# Patient Record
Sex: FEMALE | Race: WHITE | NOT HISPANIC OR LATINO
[De-identification: names, ages, dates, MRNs, and addresses within clinical notes are randomized per-mention and may not be internally consistent; named-entity substitution may affect disease eponyms.]

---

## 2017-01-16 ENCOUNTER — MEDICATION RENEWAL (OUTPATIENT)
Age: 82
End: 2017-01-16

## 2017-01-17 ENCOUNTER — NON-APPOINTMENT (OUTPATIENT)
Age: 82
End: 2017-01-17

## 2017-01-17 ENCOUNTER — APPOINTMENT (OUTPATIENT)
Dept: CARDIOLOGY | Facility: CLINIC | Age: 82
End: 2017-01-17

## 2017-01-17 VITALS
HEART RATE: 66 BPM | WEIGHT: 98 LBS | HEIGHT: 59 IN | DIASTOLIC BLOOD PRESSURE: 70 MMHG | BODY MASS INDEX: 19.76 KG/M2 | SYSTOLIC BLOOD PRESSURE: 122 MMHG

## 2017-01-17 DIAGNOSIS — R07.89 OTHER CHEST PAIN: ICD-10-CM

## 2017-01-24 ENCOUNTER — RX RENEWAL (OUTPATIENT)
Age: 82
End: 2017-01-24

## 2017-09-30 ENCOUNTER — RX RENEWAL (OUTPATIENT)
Age: 82
End: 2017-09-30

## 2017-10-11 ENCOUNTER — RX RENEWAL (OUTPATIENT)
Age: 82
End: 2017-10-11

## 2017-10-16 ENCOUNTER — RX RENEWAL (OUTPATIENT)
Age: 82
End: 2017-10-16

## 2017-10-23 ENCOUNTER — RESULT REVIEW (OUTPATIENT)
Age: 82
End: 2017-10-23

## 2017-11-20 ENCOUNTER — OUTPATIENT (OUTPATIENT)
Dept: OUTPATIENT SERVICES | Facility: HOSPITAL | Age: 82
LOS: 1 days | End: 2017-11-20
Payer: MEDICARE

## 2017-11-20 VITALS
DIASTOLIC BLOOD PRESSURE: 73 MMHG | SYSTOLIC BLOOD PRESSURE: 139 MMHG | RESPIRATION RATE: 17 BRPM | HEIGHT: 58 IN | OXYGEN SATURATION: 97 % | WEIGHT: 103.84 LBS | TEMPERATURE: 99 F | HEART RATE: 54 BPM

## 2017-11-20 DIAGNOSIS — C50.912 MALIGNANT NEOPLASM OF UNSPECIFIED SITE OF LEFT FEMALE BREAST: ICD-10-CM

## 2017-11-20 DIAGNOSIS — Z98.890 OTHER SPECIFIED POSTPROCEDURAL STATES: Chronic | ICD-10-CM

## 2017-11-20 DIAGNOSIS — I48.2 CHRONIC ATRIAL FIBRILLATION: ICD-10-CM

## 2017-11-20 DIAGNOSIS — S82.009A UNSPECIFIED FRACTURE OF UNSPECIFIED PATELLA, INITIAL ENCOUNTER FOR CLOSED FRACTURE: Chronic | ICD-10-CM

## 2017-11-20 DIAGNOSIS — Z01.818 ENCOUNTER FOR OTHER PREPROCEDURAL EXAMINATION: ICD-10-CM

## 2017-11-20 DIAGNOSIS — R26.81 UNSTEADINESS ON FEET: ICD-10-CM

## 2017-11-20 PROCEDURE — 80048 BASIC METABOLIC PNL TOTAL CA: CPT

## 2017-11-20 PROCEDURE — 85027 COMPLETE CBC AUTOMATED: CPT

## 2017-11-20 PROCEDURE — 83036 HEMOGLOBIN GLYCOSYLATED A1C: CPT

## 2017-11-20 PROCEDURE — G0463: CPT

## 2017-11-20 RX ORDER — LIDOCAINE HCL 20 MG/ML
0.2 VIAL (ML) INJECTION ONCE
Qty: 0 | Refills: 0 | Status: DISCONTINUED | OUTPATIENT
Start: 2017-11-27 | End: 2017-12-12

## 2017-11-20 RX ORDER — SODIUM CHLORIDE 9 MG/ML
3 INJECTION INTRAMUSCULAR; INTRAVENOUS; SUBCUTANEOUS EVERY 8 HOURS
Qty: 0 | Refills: 0 | Status: DISCONTINUED | OUTPATIENT
Start: 2017-11-27 | End: 2017-12-12

## 2017-11-20 NOTE — H&P PST ADULT - HISTORY OF PRESENT ILLNESS
89 year old female felt lump left breast had mammo/sono biopsy + left breast cancer for surgery.  HX of digoxin  "is not on any medication that could cause bleeding due to hx of falls. "

## 2017-11-20 NOTE — H&P PST ADULT - NSANTHOSAYNRD_GEN_A_CORE
No. BRANDON screening performed.  STOP BANG Legend: 0-2 = LOW Risk; 3-4 = INTERMEDIATE Risk; 5-8 = HIGH Risk

## 2017-11-20 NOTE — H&P PST ADULT - PMH
Age-Related Hearing Loss  wears hearing aids  Atrial Fibrillation    Bleeding Duodenal Ulcer  resolved several years ago  CAD (coronary artery disease)    Fall, subsequent encounter  2 years  HTN (Hypertension)    Hypercholesteremia    Injury of head, subsequent encounter  s/p fall 2 years ago   had bleed into area now ok.  Malignant neoplasm of left breast in female, estrogen receptor positive, unspecified site of breast  ER/NE positive  HER Negative (per family)  Osteopenia    Polymyalgia rheumatica Age-Related Hearing Loss  wears hearing aids  Atrial Fibrillation    Bleeding Duodenal Ulcer  resolved several years ago  CAD (coronary artery disease)    Fall, subsequent encounter  2 years  HTN (Hypertension)    Hypercholesteremia    Injury of head, subsequent encounter  s/p fall 2 years ago   had bleed into area now ok.  Malignant neoplasm of left breast in female, estrogen receptor positive, unspecified site of breast  ER/WA positive  HER Negative (per family)  Osteopenia    Polymyalgia rheumatica    Unsteady gait

## 2017-11-20 NOTE — H&P PST ADULT - ATTENDING COMMENTS
Anesthesia can only approve light sedation with local in light of hx of pul htn.  Implications of this discussed with patient and son.  Patient will be more aware of surgery and local anesthetic main form of pain control.  In light of small breast size this is believed to be reasonable but risks of potential awareness of discomfort discussed with patient.  Patient accepts and agrees to proceed.

## 2017-11-20 NOTE — H&P PST ADULT - PSH
Atherosclerotic Coronary Vascular Disease  coronary stent placement in 2004  Closed fracture of patella  left   surgery with pins/screws  4- 5 years ago  S/P breast biopsy, left    S/P colonoscopy  8 years ago negative  Stented Coronary Artery  PCI X 1

## 2017-11-22 ENCOUNTER — APPOINTMENT (OUTPATIENT)
Dept: CARDIOLOGY | Facility: CLINIC | Age: 82
End: 2017-11-22
Payer: MEDICARE

## 2017-11-22 ENCOUNTER — NON-APPOINTMENT (OUTPATIENT)
Age: 82
End: 2017-11-22

## 2017-11-22 ENCOUNTER — OUTPATIENT (OUTPATIENT)
Dept: OUTPATIENT SERVICES | Facility: HOSPITAL | Age: 82
LOS: 1 days | End: 2017-11-22
Payer: MEDICARE

## 2017-11-22 ENCOUNTER — APPOINTMENT (OUTPATIENT)
Dept: ULTRASOUND IMAGING | Facility: IMAGING CENTER | Age: 82
End: 2017-11-22
Payer: MEDICARE

## 2017-11-22 VITALS
SYSTOLIC BLOOD PRESSURE: 153 MMHG | DIASTOLIC BLOOD PRESSURE: 63 MMHG | RESPIRATION RATE: 14 BRPM | BODY MASS INDEX: 20.76 KG/M2 | HEART RATE: 62 BPM | HEIGHT: 59 IN | WEIGHT: 103 LBS | OXYGEN SATURATION: 96 %

## 2017-11-22 DIAGNOSIS — C50.912 MALIGNANT NEOPLASM OF UNSPECIFIED SITE OF LEFT FEMALE BREAST: ICD-10-CM

## 2017-11-22 DIAGNOSIS — S82.009A UNSPECIFIED FRACTURE OF UNSPECIFIED PATELLA, INITIAL ENCOUNTER FOR CLOSED FRACTURE: Chronic | ICD-10-CM

## 2017-11-22 DIAGNOSIS — Z98.890 OTHER SPECIFIED POSTPROCEDURAL STATES: Chronic | ICD-10-CM

## 2017-11-22 DIAGNOSIS — Z01.810 ENCOUNTER FOR PREPROCEDURAL CARDIOVASCULAR EXAMINATION: ICD-10-CM

## 2017-11-22 PROCEDURE — C1739: CPT

## 2017-11-22 PROCEDURE — 19285 PERQ DEV BREAST 1ST US IMAG: CPT

## 2017-11-22 PROCEDURE — 93000 ELECTROCARDIOGRAM COMPLETE: CPT

## 2017-11-22 PROCEDURE — 19285 PERQ DEV BREAST 1ST US IMAG: CPT | Mod: LT

## 2017-11-22 PROCEDURE — 99215 OFFICE O/P EST HI 40 MIN: CPT

## 2017-11-22 RX ORDER — SUVOREXANT 10 MG/1
10 TABLET, FILM COATED ORAL
Qty: 10 | Refills: 0 | Status: COMPLETED | COMMUNITY
Start: 2017-08-15

## 2017-11-22 RX ORDER — METFORMIN ER 500 MG 500 MG/1
500 TABLET ORAL
Qty: 60 | Refills: 0 | Status: COMPLETED | COMMUNITY
Start: 2017-05-01

## 2017-11-22 RX ORDER — METRONIDAZOLE 7.5 MG/G
0.75 LOTION TOPICAL
Qty: 59 | Refills: 0 | Status: COMPLETED | COMMUNITY
Start: 2017-07-19

## 2017-11-22 RX ORDER — BLOOD SUGAR DIAGNOSTIC
STRIP MISCELLANEOUS
Qty: 100 | Refills: 0 | Status: COMPLETED | COMMUNITY
Start: 2017-08-21

## 2017-11-22 RX ORDER — VALACYCLOVIR 1 G/1
1 TABLET, FILM COATED ORAL
Qty: 21 | Refills: 0 | Status: DISCONTINUED | COMMUNITY
Start: 2017-05-26

## 2017-11-22 RX ORDER — METRONIDAZOLE 7.5 MG/G
0.75 GEL VAGINAL
Qty: 70 | Refills: 0 | Status: COMPLETED | COMMUNITY
Start: 2017-10-23

## 2017-11-22 RX ORDER — SODIUM SULFACETAMIDE 100 MG/ML
10 LOTION TOPICAL
Qty: 118 | Refills: 0 | Status: COMPLETED | COMMUNITY
Start: 2017-10-10

## 2017-11-22 RX ORDER — NEPAFENAC 3 MG/ML
0.3 SUSPENSION/ DROPS OPHTHALMIC
Qty: 1 | Refills: 0 | Status: COMPLETED | COMMUNITY
Start: 2017-05-09

## 2017-11-22 RX ORDER — TOBRAMYCIN AND DEXAMETHASONE 3; 1 MG/ML; MG/ML
0.3-0.1 SUSPENSION/ DROPS OPHTHALMIC
Qty: 5 | Refills: 0 | Status: DISCONTINUED | COMMUNITY
Start: 2017-07-14

## 2017-11-22 RX ORDER — LANCETS 28 GAUGE
EACH MISCELLANEOUS
Qty: 100 | Refills: 0 | Status: COMPLETED | COMMUNITY
Start: 2017-08-21

## 2017-11-22 RX ORDER — NEOMYCIN AND POLYMYXIN B SULFATES AND DEXAMETHASONE 3.5; 10000; 1 MG/G; [IU]/G; MG/G
3.5-10000-0.1 OINTMENT OPHTHALMIC
Qty: 3 | Refills: 0 | Status: COMPLETED | COMMUNITY
Start: 2017-06-09

## 2017-11-24 ENCOUNTER — RESULT REVIEW (OUTPATIENT)
Age: 82
End: 2017-11-24

## 2017-11-24 ENCOUNTER — OUTPATIENT (OUTPATIENT)
Dept: OUTPATIENT SERVICES | Facility: HOSPITAL | Age: 82
LOS: 1 days | End: 2017-11-24
Payer: MEDICARE

## 2017-11-24 DIAGNOSIS — Z98.890 OTHER SPECIFIED POSTPROCEDURAL STATES: Chronic | ICD-10-CM

## 2017-11-24 DIAGNOSIS — S82.009A UNSPECIFIED FRACTURE OF UNSPECIFIED PATELLA, INITIAL ENCOUNTER FOR CLOSED FRACTURE: Chronic | ICD-10-CM

## 2017-11-24 DIAGNOSIS — C50.919 MALIGNANT NEOPLASM OF UNSPECIFIED SITE OF UNSPECIFIED FEMALE BREAST: ICD-10-CM

## 2017-11-24 LAB — SURGICAL PATHOLOGY STUDY: SIGNIFICANT CHANGE UP

## 2017-11-24 PROCEDURE — 88321 CONSLTJ&REPRT SLD PREP ELSWR: CPT

## 2017-11-24 PROCEDURE — 88321 CONSLTJ&REPRT SLD PREP ELSWR: CPT | Mod: 59

## 2017-11-27 ENCOUNTER — OUTPATIENT (OUTPATIENT)
Dept: OUTPATIENT SERVICES | Facility: HOSPITAL | Age: 82
LOS: 1 days | End: 2017-11-27
Payer: MEDICARE

## 2017-11-27 ENCOUNTER — RESULT REVIEW (OUTPATIENT)
Age: 82
End: 2017-11-27

## 2017-11-27 VITALS
HEART RATE: 51 BPM | DIASTOLIC BLOOD PRESSURE: 68 MMHG | SYSTOLIC BLOOD PRESSURE: 170 MMHG | HEIGHT: 58 IN | RESPIRATION RATE: 14 BRPM | OXYGEN SATURATION: 99 % | TEMPERATURE: 97 F | WEIGHT: 103.84 LBS

## 2017-11-27 VITALS
OXYGEN SATURATION: 97 % | RESPIRATION RATE: 16 BRPM | SYSTOLIC BLOOD PRESSURE: 142 MMHG | DIASTOLIC BLOOD PRESSURE: 76 MMHG | HEART RATE: 52 BPM

## 2017-11-27 DIAGNOSIS — Z98.890 OTHER SPECIFIED POSTPROCEDURAL STATES: Chronic | ICD-10-CM

## 2017-11-27 DIAGNOSIS — C50.912 MALIGNANT NEOPLASM OF UNSPECIFIED SITE OF LEFT FEMALE BREAST: ICD-10-CM

## 2017-11-27 DIAGNOSIS — S82.009A UNSPECIFIED FRACTURE OF UNSPECIFIED PATELLA, INITIAL ENCOUNTER FOR CLOSED FRACTURE: Chronic | ICD-10-CM

## 2017-11-27 PROCEDURE — 19301 PARTIAL MASTECTOMY: CPT | Mod: LT

## 2017-11-27 PROCEDURE — 88305 TISSUE EXAM BY PATHOLOGIST: CPT

## 2017-11-27 PROCEDURE — 76098 X-RAY EXAM SURGICAL SPECIMEN: CPT

## 2017-11-27 PROCEDURE — 88305 TISSUE EXAM BY PATHOLOGIST: CPT | Mod: 26

## 2017-11-27 PROCEDURE — 76098 X-RAY EXAM SURGICAL SPECIMEN: CPT | Mod: 26

## 2017-11-27 RX ORDER — CELECOXIB 200 MG/1
200 CAPSULE ORAL ONCE
Qty: 0 | Refills: 0 | Status: COMPLETED | OUTPATIENT
Start: 2017-11-27 | End: 2017-11-27

## 2017-11-27 RX ORDER — SODIUM CHLORIDE 9 MG/ML
1000 INJECTION, SOLUTION INTRAVENOUS
Qty: 0 | Refills: 0 | Status: DISCONTINUED | OUTPATIENT
Start: 2017-11-27 | End: 2017-12-12

## 2017-11-27 RX ORDER — ACETAMINOPHEN 500 MG
975 TABLET ORAL ONCE
Qty: 0 | Refills: 0 | Status: COMPLETED | OUTPATIENT
Start: 2017-11-27 | End: 2017-11-27

## 2017-11-27 RX ORDER — ONDANSETRON 8 MG/1
4 TABLET, FILM COATED ORAL ONCE
Qty: 0 | Refills: 0 | Status: DISCONTINUED | OUTPATIENT
Start: 2017-11-27 | End: 2017-12-12

## 2017-11-27 NOTE — ASU DISCHARGE PLAN (ADULT/PEDIATRIC). - NOTIFY
Pain not relieved by Medications/Swelling that continues/Persistent Nausea and Vomiting/Fever greater than 101/Bleeding that does not stop/Numbness, color, or temperature change to extremity

## 2017-11-27 NOTE — BRIEF OPERATIVE NOTE - SPECIMENS
left partial mastectomy, superior ant margin, sup post margin, anterior medial margin, anterior lateral margin

## 2017-11-27 NOTE — ASU PATIENT PROFILE, ADULT - PMH
Age-Related Hearing Loss  wears hearing aids  Atrial Fibrillation    Bleeding Duodenal Ulcer  resolved several years ago  CAD (coronary artery disease)    Fall, subsequent encounter  2 years  HTN (Hypertension)    Hypercholesteremia    Injury of head, subsequent encounter  s/p fall 2 years ago   had bleed into area now ok.  Malignant neoplasm of left breast in female, estrogen receptor positive, unspecified site of breast  ER/NV positive  HER Negative (per family)  Osteopenia    Polymyalgia rheumatica    Unsteady gait

## 2017-11-27 NOTE — ASU DISCHARGE PLAN (ADULT/PEDIATRIC). - SPECIAL INSTRUCTIONS
Please follow up with Dr. Obregon at your appointment on December 6 at 12 pm, by calling [phone]    Notify your surgeon and return to ER for temperatures greater than 101, chills sweats, pain not controlled with pain medications, persistent nausea and vomiting, or acutely concerning matters to you, that may require urgent medical attention.    Please keep incision sites clean and dry, shower only.  Do not bathe or immerse incision sites in water for a prolonged amount of time.  Clear plastic dressing remains in place for 2 days after your surgery.     Prescription medications have been sent to your pharmacy

## 2017-11-27 NOTE — BRIEF OPERATIVE NOTE - PROCEDURE
<<-----Click on this checkbox to enter Procedure Partial mastectomy after needle localization  11/27/2017  Left yecenia cr   Active  MMANCUSO

## 2017-11-27 NOTE — BRIEF OPERATIVE NOTE - PRE-OP DX
Malignant neoplasm of upper-outer quadrant of left breast in female, estrogen receptor positive  11/27/2017    Active  Janee Obregon

## 2017-11-27 NOTE — PRE-ANESTHESIA EVALUATION ADULT - LAST ECHOCARDIOGRAM
KUB



CLINICAL HISTORY: Nephrolithiasis.    



COMPARISON STUDY:  KUB March 20, 2017. 



FINDINGS: Multiple bilateral renal calculi are noted. These measure up to 4 mm.

The appearance is similar to exam of March 20, 2017. A peripherally calcified

abnormality within the pelvis with peripheral calcification reflects a

peritoneal body. Additional pelvic calcifications reflect vascular

calcifications and phleboliths. No ureteral calculi are identified.



IMPRESSION:  No significant change in bilateral nephrolithiasis. No ureteral

calculi identified. 







Electronically signed by:  Phu Meyer M.D.

7/10/2017 1:56 PM



Dictated Date/Time:  7/10/2017 1:54 PM
2011

## 2017-11-27 NOTE — ASU DISCHARGE PLAN (ADULT/PEDIATRIC). - MEDICATION SUMMARY - MEDICATIONS TO TAKE
I will START or STAY ON the medications listed below when I get home from the hospital:    digoxin 125 mcg (0.125 mg) oral tablet  -- 1 tab(s) by mouth once a day  -- Indication: For Heart rhythm    metFORMIN 500 mg oral tablet  -- 1 tab(s) by mouth 2 times a day  -- Indication: For Diabetes    simvastatin 40 mg oral tablet  -- 1 tab(s) by mouth once a day (at bedtime)  -- Indication: For Cholesterol    Benicar HCT 20 mg-12.5 mg oral tablet  -- 1 tab(s) by mouth once a day  -- Indication: For Blood pressure    metoprolol succinate 25 mg oral tablet, extended release  -- 1 tab(s) by mouth once a day  -- Indication: For Blood pressure    amLODIPine 2.5 mg oral tablet  -- 1 tab(s) by mouth once a day  -- Indication: For Blood pressure

## 2017-11-27 NOTE — PRE-ANESTHESIA EVALUATION ADULT - NSANTHPMHFT_GEN_ALL_CORE
unaware of any bleeding duodenal ulcer in the past - no recent symptoms of reflux  paroxysmal afib, LBBB, CAD with stent- last afib episode in 2010, no recent CP, SOB, or syncope  moderate pulm HTN  gait disturbance since fall in 2015 - SDH, no surgical intervention required at that time

## 2017-11-27 NOTE — ASU PATIENT PROFILE, ADULT - ABILITY TO HEAR (WITH HEARING AID OR HEARING APPLIANCE IF NORMALLY USED):
Mildly to Moderately Impaired: difficulty hearing in some environments or speaker may need to increase volume or speak distinctly rojas hearing aid/Mildly to Moderately Impaired: difficulty hearing in some environments or speaker may need to increase volume or speak distinctly

## 2017-12-03 ENCOUNTER — TRANSCRIPTION ENCOUNTER (OUTPATIENT)
Age: 82
End: 2017-12-03

## 2017-12-08 LAB — SURGICAL PATHOLOGY STUDY: SIGNIFICANT CHANGE UP

## 2018-01-01 NOTE — ASU PATIENT PROFILE, ADULT - PSH
normal (ped)...
Atherosclerotic Coronary Vascular Disease  coronary stent placement in 2004  Closed fracture of patella  left   surgery with pins/screws  4- 5 years ago  S/P breast biopsy, left    S/P colonoscopy  8 years ago negative  Stented Coronary Artery  PCI X 1

## 2018-01-02 ENCOUNTER — OUTPATIENT (OUTPATIENT)
Dept: OUTPATIENT SERVICES | Facility: HOSPITAL | Age: 83
LOS: 1 days | Discharge: HOME | End: 2018-01-02

## 2018-01-02 DIAGNOSIS — S82.009A UNSPECIFIED FRACTURE OF UNSPECIFIED PATELLA, INITIAL ENCOUNTER FOR CLOSED FRACTURE: Chronic | ICD-10-CM

## 2018-01-02 DIAGNOSIS — Z98.890 OTHER SPECIFIED POSTPROCEDURAL STATES: Chronic | ICD-10-CM

## 2018-01-11 ENCOUNTER — RX RENEWAL (OUTPATIENT)
Age: 83
End: 2018-01-11

## 2018-01-11 ENCOUNTER — MEDICATION RENEWAL (OUTPATIENT)
Age: 83
End: 2018-01-11

## 2018-01-12 ENCOUNTER — RX RENEWAL (OUTPATIENT)
Age: 83
End: 2018-01-12

## 2018-01-22 ENCOUNTER — APPOINTMENT (OUTPATIENT)
Dept: RADIATION ONCOLOGY | Facility: CLINIC | Age: 83
End: 2018-01-22
Payer: MEDICARE

## 2018-01-22 VITALS
BODY MASS INDEX: 21.44 KG/M2 | SYSTOLIC BLOOD PRESSURE: 169 MMHG | TEMPERATURE: 96.8 F | WEIGHT: 106.37 LBS | HEART RATE: 62 BPM | OXYGEN SATURATION: 98 % | RESPIRATION RATE: 14 BRPM | DIASTOLIC BLOOD PRESSURE: 74 MMHG | HEIGHT: 59 IN

## 2018-01-22 DIAGNOSIS — C50.912 MALIGNANT NEOPLASM OF UNSPECIFIED SITE OF LEFT FEMALE BREAST: ICD-10-CM

## 2018-01-22 PROCEDURE — 99204 OFFICE O/P NEW MOD 45 MIN: CPT | Mod: GC

## 2018-01-23 ENCOUNTER — EMERGENCY (EMERGENCY)
Facility: HOSPITAL | Age: 83
LOS: 1 days | Discharge: HOME | End: 2018-01-23
Attending: EMERGENCY MEDICINE | Admitting: EMERGENCY MEDICINE
Payer: MEDICARE

## 2018-01-23 VITALS
HEART RATE: 62 BPM | TEMPERATURE: 98 F | DIASTOLIC BLOOD PRESSURE: 66 MMHG | RESPIRATION RATE: 18 BRPM | SYSTOLIC BLOOD PRESSURE: 169 MMHG | OXYGEN SATURATION: 96 %

## 2018-01-23 VITALS
OXYGEN SATURATION: 94 % | RESPIRATION RATE: 18 BRPM | DIASTOLIC BLOOD PRESSURE: 94 MMHG | HEART RATE: 90 BPM | SYSTOLIC BLOOD PRESSURE: 179 MMHG

## 2018-01-23 DIAGNOSIS — Z98.890 OTHER SPECIFIED POSTPROCEDURAL STATES: Chronic | ICD-10-CM

## 2018-01-23 DIAGNOSIS — S82.009A UNSPECIFIED FRACTURE OF UNSPECIFIED PATELLA, INITIAL ENCOUNTER FOR CLOSED FRACTURE: Chronic | ICD-10-CM

## 2018-01-23 LAB
BASOPHILS # BLD AUTO: 0 K/UL — SIGNIFICANT CHANGE UP (ref 0–0.2)
BASOPHILS NFR BLD AUTO: 0.3 % — SIGNIFICANT CHANGE UP (ref 0–2)
CK MB BLD-MCNC: 4.3 % — HIGH (ref 0–3.5)
CK MB CFR SERPL CALC: 6.6 NG/ML — HIGH (ref 0–3.8)
CK SERPL-CCNC: 153 U/L — SIGNIFICANT CHANGE UP (ref 25–170)
DIGOXIN SERPL-MCNC: 0.5 NG/ML — LOW (ref 0.8–2)
EOSINOPHIL # BLD AUTO: 0.1 K/UL — SIGNIFICANT CHANGE UP (ref 0–0.5)
EOSINOPHIL NFR BLD AUTO: 2.9 % — SIGNIFICANT CHANGE UP (ref 0–6)
HCT VFR BLD CALC: 39.4 % — SIGNIFICANT CHANGE UP (ref 34.5–45)
HGB BLD-MCNC: 13.8 G/DL — SIGNIFICANT CHANGE UP (ref 11.5–15.5)
LYMPHOCYTES # BLD AUTO: 1.5 K/UL — SIGNIFICANT CHANGE UP (ref 1–3.3)
LYMPHOCYTES # BLD AUTO: 29.1 % — SIGNIFICANT CHANGE UP (ref 13–44)
MCHC RBC-ENTMCNC: 33.9 PG — SIGNIFICANT CHANGE UP (ref 27–34)
MCHC RBC-ENTMCNC: 35.1 GM/DL — SIGNIFICANT CHANGE UP (ref 32–36)
MCV RBC AUTO: 96.6 FL — SIGNIFICANT CHANGE UP (ref 80–100)
MONOCYTES # BLD AUTO: 0.5 K/UL — SIGNIFICANT CHANGE UP (ref 0–0.9)
MONOCYTES NFR BLD AUTO: 9.7 % — SIGNIFICANT CHANGE UP (ref 2–14)
NEUTROPHILS # BLD AUTO: 3 K/UL — SIGNIFICANT CHANGE UP (ref 1.8–7.4)
NEUTROPHILS NFR BLD AUTO: 58 % — SIGNIFICANT CHANGE UP (ref 43–77)
PLATELET # BLD AUTO: 154 K/UL — SIGNIFICANT CHANGE UP (ref 150–400)
RBC # BLD: 4.07 M/UL — SIGNIFICANT CHANGE UP (ref 3.8–5.2)
RBC # FLD: 11.7 % — SIGNIFICANT CHANGE UP (ref 10.3–14.5)
TROPONIN T SERPL-MCNC: <0.01 NG/ML — SIGNIFICANT CHANGE UP (ref 0–0.06)
WBC # BLD: 5.2 K/UL — SIGNIFICANT CHANGE UP (ref 3.8–10.5)
WBC # FLD AUTO: 5.2 K/UL — SIGNIFICANT CHANGE UP (ref 3.8–10.5)

## 2018-01-23 PROCEDURE — 70450 CT HEAD/BRAIN W/O DYE: CPT | Mod: 26

## 2018-01-23 PROCEDURE — 84484 ASSAY OF TROPONIN QUANT: CPT

## 2018-01-23 PROCEDURE — 93005 ELECTROCARDIOGRAM TRACING: CPT

## 2018-01-23 PROCEDURE — 99284 EMERGENCY DEPT VISIT MOD MDM: CPT | Mod: 25

## 2018-01-23 PROCEDURE — 85027 COMPLETE CBC AUTOMATED: CPT

## 2018-01-23 PROCEDURE — 80162 ASSAY OF DIGOXIN TOTAL: CPT

## 2018-01-23 PROCEDURE — 82553 CREATINE MB FRACTION: CPT

## 2018-01-23 PROCEDURE — 80053 COMPREHEN METABOLIC PANEL: CPT

## 2018-01-23 PROCEDURE — 99284 EMERGENCY DEPT VISIT MOD MDM: CPT | Mod: GC

## 2018-01-23 PROCEDURE — 82550 ASSAY OF CK (CPK): CPT

## 2018-01-23 PROCEDURE — 70450 CT HEAD/BRAIN W/O DYE: CPT

## 2018-01-23 RX ORDER — ACETAMINOPHEN 500 MG
650 TABLET ORAL ONCE
Qty: 0 | Refills: 0 | Status: COMPLETED | OUTPATIENT
Start: 2018-01-23 | End: 2018-01-23

## 2018-01-23 RX ORDER — AMLODIPINE BESYLATE 2.5 MG/1
2.5 TABLET ORAL ONCE
Qty: 0 | Refills: 0 | Status: COMPLETED | OUTPATIENT
Start: 2018-01-23 | End: 2018-01-23

## 2018-01-23 RX ORDER — METOPROLOL TARTRATE 50 MG
25 TABLET ORAL ONCE
Qty: 0 | Refills: 0 | Status: COMPLETED | OUTPATIENT
Start: 2018-01-23 | End: 2018-01-23

## 2018-01-23 RX ADMIN — Medication 650 MILLIGRAM(S): at 07:52

## 2018-01-23 RX ADMIN — Medication 650 MILLIGRAM(S): at 10:17

## 2018-01-23 RX ADMIN — Medication 25 MILLIGRAM(S): at 10:20

## 2018-01-23 RX ADMIN — AMLODIPINE BESYLATE 2.5 MILLIGRAM(S): 2.5 TABLET ORAL at 11:02

## 2018-01-23 NOTE — ED PROVIDER NOTE - OBJECTIVE STATEMENT
89yo female pmh HTN, CAD s/p stent, HLD, DM, atrial fibrillation not on a/c 2/2 falls (traumatic head bleed last year) p/w elevated blood pressures since yesterday. C/o mild headache bifrontal, non-radiating, Denies fevers, n/v/d, chest pain, dyspnea, cough, sick contacts, recent travel, photophobia, neck pain. Elevated BP -180 yesterday and this morning worsened to 200mmgHg.   Amlodipine, metoprolol, digoxin  Benecar, simvastatin, metformin 89yo female pmh HTN, CAD s/p stent, HLD, DM, atrial fibrillation not on a/c 2/2 falls (traumatic head bleed last year) p/w elevated blood pressures since yesterday. C/o mild headache bifrontal, non-radiating, Denies fevers, n/v/d, chest pain, dyspnea, cough, sick contacts, recent travel, photophobia, neck pain. Elevated BP -180 yesterday and this morning worsened to 200mmgHg. Normal SBP 120s on meds. Meds - amlodipine, metoprolol, digoxin, Benecar, simvastatin, metformin 89yo female pmh HTN, CAD s/p stent, HLD, DM, atrial fibrillation not on a/c 2/2 falls (traumatic head bleed last year) p/w elevated blood pressures since yesterday. C/o mild headache bifrontal, non-radiating, Denies fevers, n/v/d, chest pain, dyspnea, cough, sick contacts, recent travel, photophobia, neck pain. Elevated BP -180 yesterday and this morning worsened to 200mmgHg. Normal SBP 120s on meds. Meds - amlodipine, metoprolol, digoxin, Benecar, simvastatin, metformin    Significant past medical hx/surgical hx/social hx and review of systems can be found above in the history of present illness.

## 2018-01-23 NOTE — ED ADULT NURSE REASSESSMENT NOTE - NS ED NURSE REASSESS COMMENT FT1
Pt. states headache has resolved at this time. Warm blankets provided. Plan of care explained to patient. Pending CT scan.

## 2018-01-23 NOTE — ED PROVIDER NOTE - ATTENDING CONTRIBUTION TO CARE
Nemes - 89yo F w hx of IC bleed ni the past, not on anticoagulation, in the ER for HAs and elevated BPs since yesterday. Taking her meds. No neuro stx, no neck pains/stiffness. BP mildly elevated, well appearing, in NAD, neuro intact. Will give pains meds, get head CT given hx, will likely DC

## 2018-01-23 NOTE — ED PROVIDER NOTE - MEDICAL DECISION MAKING DETAILS
89F breast CA s/p lumpectomy, HTN CAD DM afib not on A/C p/w headache/ "head fogginess," elevated BPs. Due to IC bleeding in past, CA hx will CT head, labs, EKG.

## 2018-01-23 NOTE — ED ADULT NURSE NOTE - OBJECTIVE STATEMENT
89 yo F presents to ED A+OX3 89 yo F presents to ED A+OX3 via EMS c/o headache and hyptertensio. 89 yo F presents to ED A+OX3 via EMS c/o headache and hyptertension. Pt. states she is complaint with her home medications, states she was at her PMD for a routine visit yesterday and was told her "blood pressure was 170." Pt. states she took her blood pressure at home this am "to check because of the high reading yesterday" and found her SBP to be in the 170s. Pt. states she was worried about the reading and called EMS, reports a mild frontal headache that began while in the ambulance. Pt. states while en route pt. SBP was in the 200s. Pt denies recent falls, chest pain, SOB, changes in vision, fever, chills, cough, abdominal pain, numbness, tingling, weakness, N/V. Speech clear. Moves all extremities. Breathing unlabored on RA. Skin warm pink and dry. Abdomen soft. Call bell use explained to patient, pt. verbalized understaning, call bell within reach. MD aware of BP upon arrival to ED, no interventions required at this time. Comfort and safety measures in place.

## 2018-01-23 NOTE — ED PROVIDER NOTE - PHYSICAL EXAMINATION
Neuro: Awake, alert and fully oriented x 4. No evidence of cognitive or language dysfunction.  Cranial nerves: Visual fields are full. Extraocular movements full. Pupils equal, round, react to light. No nystagmus noted. Fifth nerve function is normal. There is no facial asymmetry noted. CN XII intact.   Motor exam: good tone and bulk throughout. No pronator drift. Muscle strength is 5/5 throughout. Sensory examination is intact. Normal finger to nose.

## 2018-01-23 NOTE — ED PROVIDER NOTE - PMH
Age-Related Hearing Loss  wears hearing aids  Atrial Fibrillation    Bleeding Duodenal Ulcer  resolved several years ago  CAD (coronary artery disease)    Fall, subsequent encounter  2 years  HTN (Hypertension)    Hypercholesteremia    Injury of head, subsequent encounter  s/p fall 2 years ago   had bleed into area now ok.  Malignant neoplasm of left breast in female, estrogen receptor positive, unspecified site of breast  ER/MT positive  HER Negative (per family)  Osteopenia    Polymyalgia rheumatica    Unsteady gait

## 2018-01-23 NOTE — ED PROVIDER NOTE - PROGRESS NOTE DETAILS
BP improved with home meds. Pt feels well. Ambulating. Appears well. Patient informed of ED visit findings, understands plan.  Patient provided with written and further verbal instructions not included in discharge paperwork.  Patient instructed to follow up with their primary care physician in 2-3 days and return for new, worsened, or persistent symptoms.

## 2018-01-24 ENCOUNTER — APPOINTMENT (OUTPATIENT)
Dept: CARDIOLOGY | Facility: CLINIC | Age: 83
End: 2018-01-24
Payer: MEDICARE

## 2018-01-24 VITALS
HEIGHT: 59 IN | HEART RATE: 60 BPM | DIASTOLIC BLOOD PRESSURE: 68 MMHG | SYSTOLIC BLOOD PRESSURE: 146 MMHG | RESPIRATION RATE: 14 BRPM | BODY MASS INDEX: 20.76 KG/M2 | WEIGHT: 103 LBS

## 2018-01-24 PROCEDURE — 93000 ELECTROCARDIOGRAM COMPLETE: CPT

## 2018-01-24 PROCEDURE — 99215 OFFICE O/P EST HI 40 MIN: CPT

## 2018-01-27 ENCOUNTER — RX RENEWAL (OUTPATIENT)
Age: 83
End: 2018-01-27

## 2018-02-13 ENCOUNTER — NON-APPOINTMENT (OUTPATIENT)
Age: 83
End: 2018-02-13

## 2018-02-13 ENCOUNTER — APPOINTMENT (OUTPATIENT)
Dept: CARDIOLOGY | Facility: CLINIC | Age: 83
End: 2018-02-13
Payer: MEDICARE

## 2018-02-13 VITALS
HEIGHT: 59 IN | DIASTOLIC BLOOD PRESSURE: 58 MMHG | SYSTOLIC BLOOD PRESSURE: 134 MMHG | WEIGHT: 103 LBS | HEART RATE: 60 BPM | BODY MASS INDEX: 20.76 KG/M2

## 2018-02-13 PROCEDURE — 93000 ELECTROCARDIOGRAM COMPLETE: CPT

## 2018-02-13 PROCEDURE — 99214 OFFICE O/P EST MOD 30 MIN: CPT

## 2018-02-23 ENCOUNTER — RX RENEWAL (OUTPATIENT)
Age: 83
End: 2018-02-23

## 2018-03-14 ENCOUNTER — RESULT REVIEW (OUTPATIENT)
Age: 83
End: 2018-03-14

## 2018-04-09 ENCOUNTER — APPOINTMENT (OUTPATIENT)
Dept: CARDIOLOGY | Facility: CLINIC | Age: 83
End: 2018-04-09

## 2018-04-17 ENCOUNTER — APPOINTMENT (OUTPATIENT)
Dept: CARDIOLOGY | Facility: CLINIC | Age: 83
End: 2018-04-17

## 2018-04-18 ENCOUNTER — NON-APPOINTMENT (OUTPATIENT)
Age: 83
End: 2018-04-18

## 2018-04-18 ENCOUNTER — APPOINTMENT (OUTPATIENT)
Dept: CARDIOLOGY | Facility: CLINIC | Age: 83
End: 2018-04-18
Payer: MEDICARE

## 2018-04-18 VITALS
WEIGHT: 104 LBS | BODY MASS INDEX: 20.96 KG/M2 | RESPIRATION RATE: 14 BRPM | DIASTOLIC BLOOD PRESSURE: 52 MMHG | HEIGHT: 59 IN | HEART RATE: 60 BPM | SYSTOLIC BLOOD PRESSURE: 130 MMHG

## 2018-04-18 PROCEDURE — 99214 OFFICE O/P EST MOD 30 MIN: CPT

## 2018-04-18 PROCEDURE — 93000 ELECTROCARDIOGRAM COMPLETE: CPT

## 2018-05-08 ENCOUNTER — MEDICATION RENEWAL (OUTPATIENT)
Age: 83
End: 2018-05-08

## 2018-05-15 ENCOUNTER — APPOINTMENT (OUTPATIENT)
Dept: CARDIOLOGY | Facility: CLINIC | Age: 83
End: 2018-05-15

## 2018-06-10 ENCOUNTER — RX RENEWAL (OUTPATIENT)
Age: 83
End: 2018-06-10
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
## 2018-07-16 PROBLEM — C50.912 MALIGNANT NEOPLASM OF UNSPECIFIED SITE OF LEFT FEMALE BREAST: Chronic | Status: ACTIVE | Noted: 2017-11-20

## 2018-07-16 PROBLEM — S09.90XD UNSPECIFIED INJURY OF HEAD, SUBSEQUENT ENCOUNTER: Chronic | Status: ACTIVE | Noted: 2017-11-20

## 2018-07-31 PROBLEM — R26.81 UNSTEADINESS ON FEET: Chronic | Status: ACTIVE | Noted: 2017-11-20

## 2018-07-31 PROBLEM — M85.80 OTHER SPECIFIED DISORDERS OF BONE DENSITY AND STRUCTURE, UNSPECIFIED SITE: Chronic | Status: ACTIVE | Noted: 2017-11-20

## 2018-08-03 ENCOUNTER — APPOINTMENT (OUTPATIENT)
Dept: CARDIOLOGY | Facility: CLINIC | Age: 83
End: 2018-08-03
Payer: MEDICARE

## 2018-08-03 ENCOUNTER — NON-APPOINTMENT (OUTPATIENT)
Age: 83
End: 2018-08-03

## 2018-08-03 VITALS — DIASTOLIC BLOOD PRESSURE: 72 MMHG | SYSTOLIC BLOOD PRESSURE: 150 MMHG

## 2018-08-03 VITALS
HEIGHT: 59 IN | BODY MASS INDEX: 21.17 KG/M2 | WEIGHT: 105 LBS | DIASTOLIC BLOOD PRESSURE: 73 MMHG | SYSTOLIC BLOOD PRESSURE: 159 MMHG

## 2018-08-03 DIAGNOSIS — R51 HEADACHE: ICD-10-CM

## 2018-08-03 PROCEDURE — 93000 ELECTROCARDIOGRAM COMPLETE: CPT

## 2018-08-03 PROCEDURE — 99214 OFFICE O/P EST MOD 30 MIN: CPT

## 2018-08-12 ENCOUNTER — RX RENEWAL (OUTPATIENT)
Age: 83
End: 2018-08-12

## 2018-08-16 ENCOUNTER — APPOINTMENT (OUTPATIENT)
Dept: CARDIOLOGY | Facility: CLINIC | Age: 83
End: 2018-08-16
Payer: MEDICARE

## 2018-08-16 PROCEDURE — 93224 XTRNL ECG REC UP TO 48 HRS: CPT

## 2018-08-17 ENCOUNTER — RX RENEWAL (OUTPATIENT)
Age: 83
End: 2018-08-17

## 2018-08-20 ENCOUNTER — APPOINTMENT (OUTPATIENT)
Dept: CARDIOLOGY | Facility: CLINIC | Age: 83
End: 2018-08-20
Payer: MEDICARE

## 2018-08-20 ENCOUNTER — NON-APPOINTMENT (OUTPATIENT)
Age: 83
End: 2018-08-20

## 2018-08-20 PROCEDURE — 93224 XTRNL ECG REC UP TO 48 HRS: CPT

## 2018-08-21 ENCOUNTER — APPOINTMENT (OUTPATIENT)
Dept: CARDIOLOGY | Facility: CLINIC | Age: 83
End: 2018-08-21

## 2018-09-17 ENCOUNTER — APPOINTMENT (OUTPATIENT)
Dept: CARDIOLOGY | Facility: CLINIC | Age: 83
End: 2018-09-17
Payer: MEDICARE

## 2018-09-17 ENCOUNTER — NON-APPOINTMENT (OUTPATIENT)
Age: 83
End: 2018-09-17

## 2018-09-17 VITALS
SYSTOLIC BLOOD PRESSURE: 131 MMHG | RESPIRATION RATE: 14 BRPM | BODY MASS INDEX: 20.96 KG/M2 | OXYGEN SATURATION: 97 % | HEIGHT: 59 IN | DIASTOLIC BLOOD PRESSURE: 65 MMHG | HEART RATE: 71 BPM | WEIGHT: 104 LBS

## 2018-09-17 DIAGNOSIS — I48.0 PAROXYSMAL ATRIAL FIBRILLATION: ICD-10-CM

## 2018-09-17 PROCEDURE — 93000 ELECTROCARDIOGRAM COMPLETE: CPT

## 2018-09-17 PROCEDURE — 99214 OFFICE O/P EST MOD 30 MIN: CPT

## 2018-09-30 ENCOUNTER — TRANSCRIPTION ENCOUNTER (OUTPATIENT)
Age: 83
End: 2018-09-30

## 2018-10-12 ENCOUNTER — INBOUND DOCUMENT (OUTPATIENT)
Age: 83
End: 2018-10-12

## 2018-11-20 ENCOUNTER — RX RENEWAL (OUTPATIENT)
Age: 83
End: 2018-11-20

## 2018-11-27 ENCOUNTER — RX RENEWAL (OUTPATIENT)
Age: 83
End: 2018-11-27

## 2018-12-09 ENCOUNTER — RX RENEWAL (OUTPATIENT)
Age: 83
End: 2018-12-09

## 2019-01-15 ENCOUNTER — NON-APPOINTMENT (OUTPATIENT)
Age: 84
End: 2019-01-15

## 2019-01-15 ENCOUNTER — APPOINTMENT (OUTPATIENT)
Dept: CARDIOLOGY | Facility: CLINIC | Age: 84
End: 2019-01-15
Payer: MEDICARE

## 2019-01-15 VITALS
HEIGHT: 59 IN | HEART RATE: 68 BPM | BODY MASS INDEX: 20.36 KG/M2 | RESPIRATION RATE: 14 BRPM | TEMPERATURE: 97.8 F | DIASTOLIC BLOOD PRESSURE: 78 MMHG | SYSTOLIC BLOOD PRESSURE: 148 MMHG | OXYGEN SATURATION: 96 % | WEIGHT: 101 LBS

## 2019-01-15 DIAGNOSIS — I25.10 ATHEROSCLEROTIC HEART DISEASE OF NATIVE CORONARY ARTERY W/OUT ANGINA PECTORIS: ICD-10-CM

## 2019-01-15 PROCEDURE — 93000 ELECTROCARDIOGRAM COMPLETE: CPT

## 2019-01-15 PROCEDURE — 99214 OFFICE O/P EST MOD 30 MIN: CPT

## 2019-01-15 RX ORDER — CLINDAMYCIN PHOSPHATE 100 MG/5G
2 CREAM VAGINAL
Qty: 5 | Refills: 0 | Status: COMPLETED | COMMUNITY
Start: 2018-08-22

## 2019-01-15 RX ORDER — CYCLOSPORINE 0.5 MG/ML
0.05 EMULSION OPHTHALMIC
Qty: 60 | Refills: 0 | Status: COMPLETED | COMMUNITY
Start: 2018-07-20

## 2019-01-15 RX ORDER — ERYTHROMYCIN 5 MG/G
5 OINTMENT OPHTHALMIC
Qty: 3 | Refills: 0 | Status: COMPLETED | COMMUNITY
Start: 2019-01-11

## 2019-01-15 RX ORDER — TOBRAMYCIN 3 MG/ML
0.3 SOLUTION/ DROPS OPHTHALMIC
Qty: 5 | Refills: 0 | Status: COMPLETED | COMMUNITY
Start: 2018-06-27

## 2019-01-15 NOTE — HISTORY OF PRESENT ILLNESS
[FreeTextEntry1] : She has a longstanding hx. of HTN, palpitations, CAD post PCI and HLD.  \par \par I saw her last in September.   At that time, she had been experiencing coronal and frontal headaches and a postnasal drip. Blood pressure was normal.  MRI was negative. Amlodipine dose was reduced, but the  headaches were unchanged following this reduction.\par \par Since that time, she remains well.  Her headaches have resolved.  She is able to continue her usual activities, and reports no episodes of exertional chest discomfort and no GALLARDO.  There have been no palpitations and no syncope.

## 2019-01-15 NOTE — DISCUSSION/SUMMARY
[FreeTextEntry1] : Hypertension - BP remains well controlled on current regimen; to continue the present regimen.\par \par Dependent edema, in part a side effect of amlodipine, remains controlled.\par \par One episode of PAF in 2010, no apparent recurrence since, remains in NSR today on current regimen.\par \par Chronic LBBB, unchanged.\par \par CAD, s/p remote angioplasty and stent insertion in 2004. Remains free of ischemic symptoms; unremarkable stress isotope study in June 2012.  Continue statin and beta blocker.\par \par Hyperlipidemia - continue simvastatin. \par \par She will return in 4 months.
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
## 2019-01-15 NOTE — REASON FOR VISIT
[FreeTextEntry1] : Jesenia Rajani returns for f/u regarding hypertension as well as a hx. of palpitations, CAD s/p PCI and HLD.

## 2019-01-21 ENCOUNTER — INBOUND DOCUMENT (OUTPATIENT)
Age: 84
End: 2019-01-21

## 2019-02-22 ENCOUNTER — RX RENEWAL (OUTPATIENT)
Age: 84
End: 2019-02-22

## 2019-02-27 RX ORDER — AMLODIPINE BESYLATE 5 MG/1
5 TABLET ORAL DAILY
Qty: 90 | Refills: 3 | Status: ACTIVE | COMMUNITY
Start: 2018-08-17 | End: 1900-01-01

## 2019-02-28 ENCOUNTER — TRANSCRIPTION ENCOUNTER (OUTPATIENT)
Age: 84
End: 2019-02-28

## 2019-03-01 ENCOUNTER — INBOUND DOCUMENT (OUTPATIENT)
Age: 84
End: 2019-03-01

## 2019-05-13 PROBLEM — I44.7 LEFT BUNDLE-BRANCH BLOCK: Status: ACTIVE | Noted: 2017-01-17

## 2019-05-13 NOTE — DISCUSSION/SUMMARY
[FreeTextEntry1] : PRELIMINARY NOTE\par \par \par Hypertension - BP remains well controlled on current regimen; to continue the present regimen.\par \par Dependent edema, in part a side effect of amlodipine, remains controlled.\par \par One episode of PAF in 2010, no apparent recurrence since, remains in NSR today on current regimen.\par \par Chronic LBBB, unchanged.\par \par CAD, s/p remote angioplasty and stent insertion in 2004. Remains free of ischemic symptoms; unremarkable stress isotope study in June 2012.  Continue statin and beta blocker.\par \par Hyperlipidemia - continue simvastatin. \par \par She will return in 4 months.

## 2019-05-13 NOTE — REASON FOR VISIT
[FreeTextEntry1] : PRELIMINARY NOTE\par \par \par Jesenia Gerard returns for f/u regarding hypertension as well as a hx. of palpitations, CAD s/p PCI and HLD.

## 2019-05-13 NOTE — PHYSICAL EXAM
[General Appearance - Well Developed] : well developed [Normal Appearance] : normal appearance [General Appearance - In No Acute Distress] : no acute distress [Well Groomed] : well groomed [General Appearance - Well Nourished] : well nourished [Normal Conjunctiva] : the conjunctiva exhibited no abnormalities [Eyelids - No Xanthelasma] : the eyelids demonstrated no xanthelasmas [Normal Jugular Venous V Waves Present] : normal jugular venous V waves present [Normal Jugular Venous A Waves Present] : normal jugular venous A waves present [Auscultation Breath Sounds / Voice Sounds] : lungs were clear to auscultation bilaterally [Respiration, Rhythm And Depth] : normal respiratory rhythm and effort [Bowel Sounds] : normal bowel sounds [Heart Rate And Rhythm] : heart rate and rhythm were normal [Cyanosis, Localized] : no localized cyanosis [Abnormal Walk] : normal gait [Nail Clubbing] : no clubbing of the fingernails [] : no rash [FreeTextEntry1] : 2+ pulses in the upper and lower extremities. No edema. [Skin Color & Pigmentation] : normal skin color and pigmentation [Impaired Insight] : insight and judgment were intact [Affect] : the affect was normal [Oriented To Time, Place, And Person] : oriented to person, place, and time [Mood] : the mood was normal

## 2019-05-14 ENCOUNTER — APPOINTMENT (OUTPATIENT)
Dept: CARDIOLOGY | Facility: CLINIC | Age: 84
End: 2019-05-14

## 2019-05-14 DIAGNOSIS — E78.5 HYPERLIPIDEMIA, UNSPECIFIED: ICD-10-CM

## 2019-05-14 DIAGNOSIS — I10 ESSENTIAL (PRIMARY) HYPERTENSION: ICD-10-CM

## 2019-05-14 DIAGNOSIS — I44.7 LEFT BUNDLE-BRANCH BLOCK, UNSPECIFIED: ICD-10-CM

## 2019-06-24 ENCOUNTER — MEDICATION RENEWAL (OUTPATIENT)
Age: 84
End: 2019-06-24

## 2019-07-03 ENCOUNTER — RX RENEWAL (OUTPATIENT)
Age: 84
End: 2019-07-03

## 2019-07-03 RX ORDER — OLMESARTAN MEDOXOMIL AND HYDROCHLOROTHIAZIDE 20; 12.5 MG/1; MG/1
20-12.5 TABLET ORAL
Qty: 180 | Refills: 0 | Status: ACTIVE | COMMUNITY
Start: 2018-01-28 | End: 1900-01-01

## 2019-07-11 ENCOUNTER — INBOUND DOCUMENT (OUTPATIENT)
Age: 84
End: 2019-07-11

## 2019-08-06 ENCOUNTER — TRANSCRIPTION ENCOUNTER (OUTPATIENT)
Age: 84
End: 2019-08-06

## 2019-09-18 ENCOUNTER — INPATIENT (INPATIENT)
Facility: HOSPITAL | Age: 84
LOS: 0 days | Discharge: HOME | DRG: 379 | End: 2019-09-19
Attending: INTERNAL MEDICINE | Admitting: INTERNAL MEDICINE
Payer: COMMERCIAL

## 2019-09-18 ENCOUNTER — RESULT REVIEW (OUTPATIENT)
Age: 84
End: 2019-09-18

## 2019-09-18 VITALS
DIASTOLIC BLOOD PRESSURE: 70 MMHG | WEIGHT: 97 LBS | SYSTOLIC BLOOD PRESSURE: 168 MMHG | OXYGEN SATURATION: 94 % | RESPIRATION RATE: 18 BRPM | HEIGHT: 58 IN | TEMPERATURE: 98 F | HEART RATE: 107 BPM

## 2019-09-18 DIAGNOSIS — S82.009A UNSPECIFIED FRACTURE OF UNSPECIFIED PATELLA, INITIAL ENCOUNTER FOR CLOSED FRACTURE: Chronic | ICD-10-CM

## 2019-09-18 DIAGNOSIS — Z29.9 ENCOUNTER FOR PROPHYLACTIC MEASURES, UNSPECIFIED: ICD-10-CM

## 2019-09-18 DIAGNOSIS — K92.2 GASTROINTESTINAL HEMORRHAGE, UNSPECIFIED: ICD-10-CM

## 2019-09-18 DIAGNOSIS — C50.912 MALIGNANT NEOPLASM OF UNSPECIFIED SITE OF LEFT FEMALE BREAST: ICD-10-CM

## 2019-09-18 DIAGNOSIS — I25.10 ATHEROSCLEROTIC HEART DISEASE OF NATIVE CORONARY ARTERY WITHOUT ANGINA PECTORIS: ICD-10-CM

## 2019-09-18 DIAGNOSIS — R52 PAIN, UNSPECIFIED: ICD-10-CM

## 2019-09-18 DIAGNOSIS — E78.00 PURE HYPERCHOLESTEROLEMIA, UNSPECIFIED: ICD-10-CM

## 2019-09-18 DIAGNOSIS — I10 ESSENTIAL (PRIMARY) HYPERTENSION: ICD-10-CM

## 2019-09-18 DIAGNOSIS — D64.9 ANEMIA, UNSPECIFIED: ICD-10-CM

## 2019-09-18 DIAGNOSIS — B02.9 ZOSTER WITHOUT COMPLICATIONS: ICD-10-CM

## 2019-09-18 DIAGNOSIS — Z98.890 OTHER SPECIFIED POSTPROCEDURAL STATES: Chronic | ICD-10-CM

## 2019-09-18 DIAGNOSIS — I48.91 UNSPECIFIED ATRIAL FIBRILLATION: ICD-10-CM

## 2019-09-18 LAB
ALBUMIN SERPL ELPH-MCNC: 3.1 G/DL — LOW (ref 3.3–5)
ALP SERPL-CCNC: 130 U/L — HIGH (ref 40–120)
ALT FLD-CCNC: 15 U/L — SIGNIFICANT CHANGE UP (ref 10–45)
ANION GAP SERPL CALC-SCNC: 16 MMOL/L — SIGNIFICANT CHANGE UP (ref 5–17)
APTT BLD: 34 SEC — SIGNIFICANT CHANGE UP (ref 27.5–36.3)
AST SERPL-CCNC: 15 U/L — SIGNIFICANT CHANGE UP (ref 10–40)
BASOPHILS # BLD AUTO: 0 K/UL — SIGNIFICANT CHANGE UP (ref 0–0.2)
BASOPHILS NFR BLD AUTO: 0 % — SIGNIFICANT CHANGE UP (ref 0–2)
BILIRUB SERPL-MCNC: 0.3 MG/DL — SIGNIFICANT CHANGE UP (ref 0.2–1.2)
BLD GP AB SCN SERPL QL: NEGATIVE — SIGNIFICANT CHANGE UP
BUN SERPL-MCNC: 22 MG/DL — SIGNIFICANT CHANGE UP (ref 7–23)
CALCIUM SERPL-MCNC: 10.3 MG/DL — SIGNIFICANT CHANGE UP (ref 8.4–10.5)
CHLORIDE SERPL-SCNC: 90 MMOL/L — LOW (ref 96–108)
CO2 SERPL-SCNC: 25 MMOL/L — SIGNIFICANT CHANGE UP (ref 22–31)
CREAT SERPL-MCNC: 0.68 MG/DL — SIGNIFICANT CHANGE UP (ref 0.5–1.3)
DIGOXIN SERPL-MCNC: 1.6 NG/ML — SIGNIFICANT CHANGE UP (ref 0.8–2)
EOSINOPHIL # BLD AUTO: 0 K/UL — SIGNIFICANT CHANGE UP (ref 0–0.5)
EOSINOPHIL NFR BLD AUTO: 0.2 % — SIGNIFICANT CHANGE UP (ref 0–6)
GAS PNL BLDV: SIGNIFICANT CHANGE UP
GLUCOSE BLDC GLUCOMTR-MCNC: 138 MG/DL — HIGH (ref 70–99)
GLUCOSE BLDC GLUCOMTR-MCNC: 173 MG/DL — HIGH (ref 70–99)
GLUCOSE SERPL-MCNC: 206 MG/DL — HIGH (ref 70–99)
HCT VFR BLD CALC: 24.4 % — LOW (ref 34.5–45)
HCT VFR BLD CALC: 30.4 % — LOW (ref 34.5–45)
HGB BLD-MCNC: 7.5 G/DL — LOW (ref 11.5–15.5)
HGB BLD-MCNC: 9.7 G/DL — LOW (ref 11.5–15.5)
INR BLD: 1.25 RATIO — HIGH (ref 0.88–1.16)
LYMPHOCYTES # BLD AUTO: 1 K/UL — SIGNIFICANT CHANGE UP (ref 1–3.3)
LYMPHOCYTES # BLD AUTO: 8 % — LOW (ref 13–44)
MCHC RBC-ENTMCNC: 27.4 PG — SIGNIFICANT CHANGE UP (ref 27–34)
MCHC RBC-ENTMCNC: 27.5 PG — SIGNIFICANT CHANGE UP (ref 27–34)
MCHC RBC-ENTMCNC: 30.6 GM/DL — LOW (ref 32–36)
MCHC RBC-ENTMCNC: 31.9 GM/DL — LOW (ref 32–36)
MCV RBC AUTO: 86.3 FL — SIGNIFICANT CHANGE UP (ref 80–100)
MCV RBC AUTO: 89.3 FL — SIGNIFICANT CHANGE UP (ref 80–100)
MONOCYTES # BLD AUTO: 1.2 K/UL — HIGH (ref 0–0.9)
MONOCYTES NFR BLD AUTO: 10.3 % — SIGNIFICANT CHANGE UP (ref 2–14)
NEUTROPHILS # BLD AUTO: 9.9 K/UL — HIGH (ref 1.8–7.4)
NEUTROPHILS NFR BLD AUTO: 81.5 % — HIGH (ref 43–77)
OB PNL STL: NEGATIVE — SIGNIFICANT CHANGE UP
PLATELET # BLD AUTO: 421 K/UL — HIGH (ref 150–400)
PLATELET # BLD AUTO: 554 K/UL — HIGH (ref 150–400)
POTASSIUM SERPL-MCNC: 4.2 MMOL/L — SIGNIFICANT CHANGE UP (ref 3.5–5.3)
POTASSIUM SERPL-SCNC: 4.2 MMOL/L — SIGNIFICANT CHANGE UP (ref 3.5–5.3)
PROT SERPL-MCNC: 7.3 G/DL — SIGNIFICANT CHANGE UP (ref 6–8.3)
PROTHROM AB SERPL-ACNC: 14.5 SEC — HIGH (ref 10–12.9)
RBC # BLD: 2.73 M/UL — LOW (ref 3.8–5.2)
RBC # BLD: 3.52 M/UL — LOW (ref 3.8–5.2)
RBC # FLD: 12.4 % — SIGNIFICANT CHANGE UP (ref 10.3–14.5)
RBC # FLD: 13.4 % — SIGNIFICANT CHANGE UP (ref 10.3–14.5)
RH IG SCN BLD-IMP: POSITIVE — SIGNIFICANT CHANGE UP
SODIUM SERPL-SCNC: 131 MMOL/L — LOW (ref 135–145)
WBC # BLD: 11.8 K/UL — HIGH (ref 3.8–10.5)
WBC # BLD: 12.2 K/UL — HIGH (ref 3.8–10.5)
WBC # FLD AUTO: 11.8 K/UL — HIGH (ref 3.8–10.5)
WBC # FLD AUTO: 12.2 K/UL — HIGH (ref 3.8–10.5)

## 2019-09-18 PROCEDURE — 88305 TISSUE EXAM BY PATHOLOGIST: CPT | Mod: 26

## 2019-09-18 PROCEDURE — 99285 EMERGENCY DEPT VISIT HI MDM: CPT

## 2019-09-18 PROCEDURE — 99223 1ST HOSP IP/OBS HIGH 75: CPT | Mod: 25

## 2019-09-18 PROCEDURE — 43239 EGD BIOPSY SINGLE/MULTIPLE: CPT

## 2019-09-18 PROCEDURE — 88312 SPECIAL STAINS GROUP 1: CPT | Mod: 26

## 2019-09-18 PROCEDURE — 93010 ELECTROCARDIOGRAM REPORT: CPT

## 2019-09-18 RX ORDER — METFORMIN HYDROCHLORIDE 850 MG/1
1 TABLET ORAL
Qty: 0 | Refills: 0 | DISCHARGE

## 2019-09-18 RX ORDER — DEXTROSE 50 % IN WATER 50 %
25 SYRINGE (ML) INTRAVENOUS ONCE
Refills: 0 | Status: DISCONTINUED | OUTPATIENT
Start: 2019-09-18 | End: 2019-09-19

## 2019-09-18 RX ORDER — ACETAMINOPHEN 500 MG
2 TABLET ORAL
Qty: 0 | Refills: 0 | DISCHARGE

## 2019-09-18 RX ORDER — MORPHINE SULFATE 50 MG/1
2 CAPSULE, EXTENDED RELEASE ORAL ONCE
Refills: 0 | Status: DISCONTINUED | OUTPATIENT
Start: 2019-09-18 | End: 2019-09-18

## 2019-09-18 RX ORDER — DIGOXIN 250 MCG
0.12 TABLET ORAL DAILY
Refills: 0 | Status: DISCONTINUED | OUTPATIENT
Start: 2019-09-18 | End: 2019-09-19

## 2019-09-18 RX ORDER — METOPROLOL TARTRATE 50 MG
1 TABLET ORAL
Qty: 0 | Refills: 0 | DISCHARGE

## 2019-09-18 RX ORDER — SIMVASTATIN 20 MG/1
40 TABLET, FILM COATED ORAL AT BEDTIME
Refills: 0 | Status: DISCONTINUED | OUTPATIENT
Start: 2019-09-18 | End: 2019-09-19

## 2019-09-18 RX ORDER — GLUCAGON INJECTION, SOLUTION 0.5 MG/.1ML
1 INJECTION, SOLUTION SUBCUTANEOUS ONCE
Refills: 0 | Status: DISCONTINUED | OUTPATIENT
Start: 2019-09-18 | End: 2019-09-19

## 2019-09-18 RX ORDER — DEXTROSE 50 % IN WATER 50 %
15 SYRINGE (ML) INTRAVENOUS ONCE
Refills: 0 | Status: DISCONTINUED | OUTPATIENT
Start: 2019-09-18 | End: 2019-09-19

## 2019-09-18 RX ORDER — PANTOPRAZOLE SODIUM 20 MG/1
40 TABLET, DELAYED RELEASE ORAL
Refills: 0 | Status: DISCONTINUED | OUTPATIENT
Start: 2019-09-18 | End: 2019-09-19

## 2019-09-18 RX ORDER — INSULIN LISPRO 100/ML
VIAL (ML) SUBCUTANEOUS
Refills: 0 | Status: DISCONTINUED | OUTPATIENT
Start: 2019-09-18 | End: 2019-09-19

## 2019-09-18 RX ORDER — AMLODIPINE BESYLATE 2.5 MG/1
2.5 TABLET ORAL DAILY
Refills: 0 | Status: DISCONTINUED | OUTPATIENT
Start: 2019-09-18 | End: 2019-09-19

## 2019-09-18 RX ORDER — NORTRIPTYLINE HYDROCHLORIDE 10 MG/1
25 CAPSULE ORAL AT BEDTIME
Refills: 0 | Status: DISCONTINUED | OUTPATIENT
Start: 2019-09-18 | End: 2019-09-19

## 2019-09-18 RX ORDER — PANTOPRAZOLE SODIUM 20 MG/1
80 TABLET, DELAYED RELEASE ORAL ONCE
Refills: 0 | Status: COMPLETED | OUTPATIENT
Start: 2019-09-18 | End: 2019-09-18

## 2019-09-18 RX ORDER — SODIUM CHLORIDE 9 MG/ML
1000 INJECTION, SOLUTION INTRAVENOUS
Refills: 0 | Status: DISCONTINUED | OUTPATIENT
Start: 2019-09-18 | End: 2019-09-19

## 2019-09-18 RX ORDER — DEXTROSE 50 % IN WATER 50 %
12.5 SYRINGE (ML) INTRAVENOUS ONCE
Refills: 0 | Status: DISCONTINUED | OUTPATIENT
Start: 2019-09-18 | End: 2019-09-19

## 2019-09-18 RX ORDER — METFORMIN HYDROCHLORIDE 850 MG/1
500 TABLET ORAL
Refills: 0 | Status: DISCONTINUED | OUTPATIENT
Start: 2019-09-18 | End: 2019-09-18

## 2019-09-18 RX ORDER — METOPROLOL TARTRATE 50 MG
25 TABLET ORAL DAILY
Refills: 0 | Status: DISCONTINUED | OUTPATIENT
Start: 2019-09-18 | End: 2019-09-19

## 2019-09-18 RX ORDER — ACETAMINOPHEN 500 MG
650 TABLET ORAL EVERY 4 HOURS
Refills: 0 | Status: DISCONTINUED | OUTPATIENT
Start: 2019-09-18 | End: 2019-09-19

## 2019-09-18 RX ADMIN — PANTOPRAZOLE SODIUM 40 MILLIGRAM(S): 20 TABLET, DELAYED RELEASE ORAL at 18:10

## 2019-09-18 RX ADMIN — MORPHINE SULFATE 2 MILLIGRAM(S): 50 CAPSULE, EXTENDED RELEASE ORAL at 20:28

## 2019-09-18 RX ADMIN — MORPHINE SULFATE 2 MILLIGRAM(S): 50 CAPSULE, EXTENDED RELEASE ORAL at 21:00

## 2019-09-18 RX ADMIN — NORTRIPTYLINE HYDROCHLORIDE 25 MILLIGRAM(S): 10 CAPSULE ORAL at 22:35

## 2019-09-18 RX ADMIN — PANTOPRAZOLE SODIUM 80 MILLIGRAM(S): 20 TABLET, DELAYED RELEASE ORAL at 08:52

## 2019-09-18 RX ADMIN — Medication 25 MILLIGRAM(S): at 18:10

## 2019-09-18 RX ADMIN — SIMVASTATIN 40 MILLIGRAM(S): 20 TABLET, FILM COATED ORAL at 22:34

## 2019-09-18 RX ADMIN — MORPHINE SULFATE 2 MILLIGRAM(S): 50 CAPSULE, EXTENDED RELEASE ORAL at 09:05

## 2019-09-18 RX ADMIN — MORPHINE SULFATE 2 MILLIGRAM(S): 50 CAPSULE, EXTENDED RELEASE ORAL at 08:47

## 2019-09-18 NOTE — H&P ADULT - PROBLEM SELECTOR PROBLEM 2
Malignant neoplasm of left breast in female, estrogen receptor positive, unspecified site of breast GI bleed

## 2019-09-18 NOTE — ED ADULT NURSE NOTE - OBJECTIVE STATEMENT
89 y/o female patient presents ambulatory to ED with family at the bedside, sent in by MD for "low hemoglobin 7.4". Patient dx with shingles 6 weeks ago to left lateral chest wall (lesions crusted over) - taking advil for pain. Patient followed outpatient by PMD and dermatologist, advised to come to ED for further evaluation and possible admission. Patient c/o pain to shingles site associated with generalized weakness and fatigue. Patient denies SOB and CP, N/V/D; afebrile in ED.

## 2019-09-18 NOTE — ED PROVIDER NOTE - MUSCULOSKELETAL, MLM
Spine appears normal, range of motion is not limited. + left lateral chest wall hyperesthesia and tenderness to vesicular rash, crusted over.

## 2019-09-18 NOTE — H&P ADULT - SKIN COMMENTS
few tender nodules on the left abdomen and flank and location of recent shingles one lesion was biopsied yesterday

## 2019-09-18 NOTE — H&P ADULT - NSHPLABSRESULTS_GEN_ALL_CORE
Vital Signs Last 24 Hrs  T(C): 37.1 (18 Sep 2019 13:00), Max: 37.2 (18 Sep 2019 09:45)  T(F): 98.8 (18 Sep 2019 13:00), Max: 99 (18 Sep 2019 09:45)  HR: 84 (18 Sep 2019 13:00) (84 - 107)  BP: 160/81 (18 Sep 2019 13:00) (140/67 - 168/70)  BP(mean): --  RR: 18 (18 Sep 2019 13:00) (17 - 18)  SpO2: 95% (18 Sep 2019 13:00) (94% - 95%)    Daily Height in cm: 147.32 (18 Sep 2019 06:59)    Daily     I&O's Detail      Daily     CAPILLARY BLOOD GLUCOSE      Labs:                          7.5    12.2  )-----------( 554      ( 18 Sep 2019 08:17 )             24.4     PT/INR - ( 18 Sep 2019 08:17 )   PT: 14.5 sec;   INR: 1.25 ratio         PTT - ( 18 Sep 2019 08:17 )  PTT:34.0 sec  09-18    131<L>  |  90<L>  |  22  ----------------------------<  206<H>  4.2   |  25  |  0.68    Ca    10.3      18 Sep 2019 08:17    TPro  7.3  /  Alb  3.1<L>  /  TBili  0.3  /  DBili  x   /  AST  15  /  ALT  15  /  AlkPhos  130<H>  09-18

## 2019-09-18 NOTE — ED PROVIDER NOTE - ATTENDING CONTRIBUTION TO CARE
attending Drea: 89yF h/o afib on digoxin, HTN, HLD, CAD, DMII, osteopenia, PMR, duodenal ulcer, recent shingles taking NSAIDs ATC for pain sent in by PMD for low H/H. hemoglobin 7.9 outpatient (baseline 12), Also with generalized fatigue and malaise. Denies BRBPR/melena, SOB, syncope, chest pain. Will obtain labs including type and screen, pain control for shingles, Protonix for possible UGIB, likely admit

## 2019-09-18 NOTE — H&P ADULT - PROBLEM SELECTOR PLAN 1
transfuse 1 unit prbc transfuse 1 unit prbcgiven her advanced age and nonobstructive coronary artery disease

## 2019-09-18 NOTE — PATIENT PROFILE ADULT - NSPROEXTENSIONSOFSELF_GEN_A_NUR
cane/cellphone, 3 rings, toiletry bag, white sweater, black & white with red pocketbook, elizabeth sneakers,  clothing4 cane/cellphone, 3 rings, toiletry bag, white sweater, black & white with red pocketbook, elizabeth sneakers,  clothing4/hearing aid
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
## 2019-09-18 NOTE — H&P ADULT - PROBLEM SELECTOR PLAN 8
continue Pamelor recently increased dose.  Hold NSAIDs.  Start opiate pain medicine and was trying to avoid given history of previous falls, however, may need to use opiate analgesics for control of postherpetic neuralgia pain.  Await biopsy results from dermatologist

## 2019-09-18 NOTE — H&P ADULT - HISTORY OF PRESENT ILLNESS
patient is a 89-year-old white female with hypertension, hyperlipidemia, paroxysmal atrial fibrillation, nonobstructive coronary artery disease use of hypothyroidism and and breast cancer who developed shingles about 2 months ago has been taking ibuprofen and Tylenol for pain control was seen in the office for followup yesterday and labs were drawn which revealed a hemoglobin of 7.5.  Patient denies any melena or hematochezia or hematemesis.  Last hemoglobin in the office was 12.1 in May of 2019 patient denies any lightheadedness, dizziness, shortness of breath or chest pain.  Patient has history of previous duodenal ulcer.  Patient sent to the emergency room for blood transfusion and admission for endoscopy

## 2019-09-18 NOTE — CONSULT NOTE ADULT - ASSESSMENT
89 female PMH AF on digoxin (not on AC), HTN, HLD, CAD (on Plavix), osteopenia, PMR, remote history of duodenal ulcer, recent shingles diagnosis presents to the ED c/o low H/H as outpt. Recent onset shingles taking PO NSAIDs for pain/analgesics since 7/2019  Baseline hgb ~12, now Hgb 7.5; hemodynamically stable  Without overt/brisk GI blood loss, brown stools. Suspect occult GI blood loss anemia, likely UGI source   DDx PUD, gastritis most likely      RECS:  NPO except meds/ice chips  EGD today  Monitor CBC and BMs  order noted per PMD for PRBCs  IV PPI as ordered  no NSAIDs    Discussed with pt and son at bedside  Discussed with ER PA and attending and PMD    Thank you for the courtesy of this consult.  Fernando Salomon PA-C    [facility]  [phone]  After hours and weekend coverage [phone]

## 2019-09-18 NOTE — CONSULT NOTE ADULT - SUBJECTIVE AND OBJECTIVE BOX
Patient is a 89y old  Female who presents with a chief complaint of     HPI:  89 female pmhx afib on digoxin (not on AC), HTN, HLD, CAD (on Plavix), osteopenia, PMR, remote history of duodenal ulcer, recent shingles diagnosis presents to the ED c/o low H/H as outpt. Pt reports since diagnosed w/ shingles she's been taking 400 mg of Advil every 4 hours (states initially started NSAIDs in 7/2019). Has been following up w/ her PMD and dermatologist regarding shingles, had biopsy of lesions done w/ derm yesterday and outpt blood work done by PMD yesterday showed hemoglobin of 7.4, baseline Hgb ~12 in 5/2019. Sent in today by PMD for admission. Endorses pain to shingles on L lateral chest wall as well as generalized weakness and fatigue. Denies chest pain, shortness of breath, palpitations, dizziness, n/v/d, melena, BRBPR, abdominal pain, hematuria, urinary urgency, hematuria, hemoptysis, cough.  Stools have been brown in color. no nausea or vomiting. +decreased appetite and generalized fatigue.    Lives alone, independent and functional in [place]s      PAST MEDICAL & SURGICAL HISTORY:  Unsteady gait  Osteopenia  Injury of head, subsequent encounter: s/p fall 2 years ago   had bleed into area now ok.  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, estrogen receptor positive, unspecified site of breast: ER/[place] positive  HER Negative (per family)  Polymyalgia rheumatica  CAD (coronary artery disease)  Bleeding Duodenal Ulcer: resolved several years ago  Age-Related Hearing Loss: wears hearing aids  Hypercholesteremia  HTN (Hypertension)  Atrial Fibrillation  Closed fracture of patella: left   surgery with pins/screws  4- 5 years ago  S/P colonoscopy: 8 years ago negative  S/P breast biopsy, left  Atherosclerotic Coronary Vascular Disease: coronary stent placement in 2004  Stented Coronary Artery: PCI X 1      Allergies  penicillin (Unknown)      MEDICATIONS  (STANDING):    MEDICATIONS  (PRN):      Social History:    Marital Status:  (   )    (   ) Single    (   )    ( X )   Lives with: (X  ) alone  (  ) children   (  ) spouse   (  ) parents  (  ) other    Substance Use (street drugs): ( X ) never used  (  ) other:  Tobacco Usage:  ( X  ) never smoked   (   ) former smoker   (   ) current smoker  (     ) pack year  (        ) last cigarette date  Alcohol Usage: social      Family History   IBD (  ) Yes   ( X ) No  GI Malignancy (  )  Yes    ( X ) No    Health Management     Last Colonoscopy - 8 years ago, negative      Advanced Directives: (  X   ) None    (      ) DNR    (     ) DNI    (     ) Health Care Proxy:     Review of Systems:    General:  No wt loss, fevers, chills, night sweats, +fatigue,   CV:  No pain, palpitations, +HTN +CAD +AF (on digoxin, no AC)  Resp:  No dyspnea, cough, tachypnea, wheezing  GI: see HPI  :  No pain, bleeding, incontinence, nocturia  Muscle:  +PMR, left chest wall pain 2/2 shingles  Neuro:  No weakness, tingling, memory problems +hearing loss  Psych:  No fatigue, insomnia, mood problems, depression  Endocrine:  No polyuria, polydypsia, cold/heat intolerance  Heme:  No petechiae, ecchymosis, easy bruisability  Skin:  No tattoos, scars, edema +shingles      Vital Signs Last 24 Hrs  T(C): 36.4 (18 Sep 2019 06:59), Max: 36.4 (18 Sep 2019 06:59)  T(F): 97.5 (18 Sep 2019 06:59), Max: 97.5 (18 Sep 2019 06:59)  HR: 86 (18 Sep 2019 07:55) (86 - 107)  BP: 154/66 (18 Sep 2019 07:55) (154/66 - 168/70)  BP(mean): --  RR: 17 (18 Sep 2019 07:55) (17 - 18)  SpO2: 95% (18 Sep 2019 07:55) (94% - 95%)    PHYSICAL EXAM:    Constitutional: NAD, well-developed pleasant WF appears younger than stated age  Neck: No LAD, supple no JVD  Respiratory: clear b/l  Cardiovascular: S1 and S2, RRR  Gastrointestinal: BS+, soft, NT/ND, neg HSM,  Extremities: No peripheral edema, neg clubbing, cyanosis  Vascular: 2+ peripheral pulses  Neurological: A/O x 3, no focal deficits  Psychiatric: Normal mood, normal affect  Skin: +left lower chest wall along anterior axillary line, crusted lesions c/w shingles, covered by band-aid. +tender to light touch/palpation in surrounding area  Lidoderm patch in place lateral to lesions        LABS:                        7.5    12.2  )-----------( 554      ( 18 Sep 2019 08:17 )             24.4     09-18    131<L>  |  90<L>  |  22  ----------------------------<  206<H>  4.2   |  25  |  0.68    Ca    10.3      18 Sep 2019 08:17    TPro  7.3  /  Alb  3.1<L>  /  TBili  0.3  /  DBili  x   /  AST  15  /  ALT  15  /  AlkPhos  130<H>  09-18    PT/INR - ( 18 Sep 2019 08:17 )   PT: 14.5 sec;   INR: 1.25 ratio    PTT - ( 18 Sep 2019 08:17 )  PTT:34.0 sec        RADIOLOGY & ADDITIONAL TESTS:

## 2019-09-18 NOTE — H&P ADULT - NSICDXPASTSURGICALHX_GEN_ALL_CORE_FT
PAST SURGICAL HISTORY:  Atherosclerotic Coronary Vascular Disease coronary stent placement in 2004    Closed fracture of patella left   surgery with pins/screws  4- 5 years ago    S/P breast biopsy, left     S/P colonoscopy 8 years ago negative    Stented Coronary Artery PCI X 1

## 2019-09-18 NOTE — H&P ADULT - PROBLEM SELECTOR PROBLEM 8
INSTRUCTIONS FOR PRE-SURGICAL   ANTIMICROBIAL BATH/SHOWER    Your doctor has recommended a pre-surgical CHG (chlorhexidine gluconate) shower/bath with Betasept (also sold as Hibiclens). It reduces bacteria that can potentially cause infection.   Betasept Keep out of reach of children. If swallowed get medica help or contact [employer]. Store between 60-80 degrees F. Fabric Warning! CHG WILL STAIN YOUR FABRICS! Use with care around shower curtains, towels washcloths rugs and clothes.   Wipe Shingles

## 2019-09-18 NOTE — H&P ADULT - NSICDXPASTMEDICALHX_GEN_ALL_CORE_FT
PAST MEDICAL HISTORY:  Age-Related Hearing Loss wears hearing aids    Atrial Fibrillation     Bleeding Duodenal Ulcer resolved several years ago    CAD (coronary artery disease)     Fall, subsequent encounter 2 years    HTN (Hypertension)     Hypercholesteremia     Injury of head, subsequent encounter s/p fall 2 years ago   had bleed into area now ok.    Malignant neoplasm of left breast in female, estrogen receptor positive, unspecified site of breast ER/[place] positive  HER Negative (per family)    Osteopenia     Polymyalgia rheumatica     Unsteady gait

## 2019-09-18 NOTE — ED PROVIDER NOTE - OBJECTIVE STATEMENT
89 female pmhx afib on digoxin, HTN, HLD, CAD, osteopenia, PMR, duodenal ulcer, recent shingles diagnosis presents to the ED c/o low H/H as outpt. Pt reports since diagnosed w/ shingles she's been taking 400 mg of Advil every 4 hours. Has been following up w/ her PMD and dermatologist regarding shingles, had biopsy of lesions done w/ derm yesterday and outpt blood work done by PMD yesterday showed hemoglobin of 7.4, sent in today by PMD for admission. Endorses pain to shingles on L lateral chest wall as well as generalized weakness and fatigue. Denies chest pain, shortness of breath, palpitations, dizziness, n/v/d, melena, BRBPR, abdominal pain, hematuria, urinary urgency, 89 female pmhx afib on digoxin, HTN, HLD, CAD, osteopenia, PMR, duodenal ulcer, recent shingles diagnosis presents to the ED c/o low H/H as outpt. Pt reports since diagnosed w/ shingles she's been taking 400 mg of Advil every 4 hours. Has been following up w/ her PMD and dermatologist regarding shingles, had biopsy of lesions done w/ derm yesterday and outpt blood work done by PMD yesterday showed hemoglobin of 7.4, sent in today by PMD for admission. Endorses pain to shingles on L lateral chest wall as well as generalized weakness and fatigue. Denies chest pain, shortness of breath, palpitations, dizziness, n/v/d, melena, BRBPR, abdominal pain, hematuria, urinary urgency, hematuria, hemoptysis, cough.

## 2019-09-18 NOTE — ED PROVIDER NOTE - PROGRESS NOTE DETAILS
Occult feces specimen sent. H/H noted. Pt consented for blood transfusion, consent signed and in chart. All questions/concerns answered. - Dale Rivera PA-C

## 2019-09-18 NOTE — ED ADULT NURSE REASSESSMENT NOTE - NS ED NURSE REASSESS COMMENT FT1
Spoke with Charge MARGIE Flores in Endoscopy - advised patient received bed assignment to [address] [place] [address]. Made Endoscopy Charge RN aware that I called and spoke with [facility] RN Katy - report given.

## 2019-09-18 NOTE — ED ADULT NURSE NOTE - PMH
Age-Related Hearing Loss  wears hearing aids  Atrial Fibrillation    Bleeding Duodenal Ulcer  resolved several years ago  CAD (coronary artery disease)    Fall, subsequent encounter  2 years  HTN (Hypertension)    Hypercholesteremia    Injury of head, subsequent encounter  s/p fall 2 years ago   had bleed into area now ok.  Malignant neoplasm of left breast in female, estrogen receptor positive, unspecified site of breast  ER/[place] positive  HER Negative (per family)  Osteopenia    Polymyalgia rheumatica    Unsteady gait

## 2019-09-18 NOTE — H&P ADULT - PROBLEM SELECTOR PROBLEM 7
Atrial Fibrillation Malignant neoplasm of left breast in female, estrogen receptor positive, unspecified site of breast

## 2019-09-19 ENCOUNTER — TRANSCRIPTION ENCOUNTER (OUTPATIENT)
Age: 84
End: 2019-09-19

## 2019-09-19 VITALS
RESPIRATION RATE: 16 BRPM | OXYGEN SATURATION: 94 % | TEMPERATURE: 98 F | HEART RATE: 70 BPM | DIASTOLIC BLOOD PRESSURE: 63 MMHG | SYSTOLIC BLOOD PRESSURE: 128 MMHG

## 2019-09-19 LAB
ALBUMIN SERPL ELPH-MCNC: 2.6 G/DL — LOW (ref 3.3–5)
ALP SERPL-CCNC: 101 U/L — SIGNIFICANT CHANGE UP (ref 40–120)
ALT FLD-CCNC: 8 U/L — LOW (ref 10–45)
ANION GAP SERPL CALC-SCNC: 12 MMOL/L — SIGNIFICANT CHANGE UP (ref 5–17)
AST SERPL-CCNC: 9 U/L — LOW (ref 10–40)
BILIRUB SERPL-MCNC: 0.4 MG/DL — SIGNIFICANT CHANGE UP (ref 0.2–1.2)
BUN SERPL-MCNC: 17 MG/DL — SIGNIFICANT CHANGE UP (ref 7–23)
CALCIUM SERPL-MCNC: 9.6 MG/DL — SIGNIFICANT CHANGE UP (ref 8.4–10.5)
CHLORIDE SERPL-SCNC: 93 MMOL/L — LOW (ref 96–108)
CO2 SERPL-SCNC: 26 MMOL/L — SIGNIFICANT CHANGE UP (ref 22–31)
CREAT SERPL-MCNC: 0.71 MG/DL — SIGNIFICANT CHANGE UP (ref 0.5–1.3)
DIGOXIN SERPL-MCNC: 0.8 NG/ML — SIGNIFICANT CHANGE UP (ref 0.8–2)
GLUCOSE BLDC GLUCOMTR-MCNC: 112 MG/DL — HIGH (ref 70–99)
GLUCOSE BLDC GLUCOMTR-MCNC: 148 MG/DL — HIGH (ref 70–99)
GLUCOSE SERPL-MCNC: 104 MG/DL — HIGH (ref 70–99)
HBA1C BLD-MCNC: 7.5 % — HIGH (ref 4–5.6)
HCT VFR BLD CALC: 28.2 % — LOW (ref 34.5–45)
HGB BLD-MCNC: 9.2 G/DL — LOW (ref 11.5–15.5)
MCHC RBC-ENTMCNC: 27.2 PG — SIGNIFICANT CHANGE UP (ref 27–34)
MCHC RBC-ENTMCNC: 32.6 GM/DL — SIGNIFICANT CHANGE UP (ref 32–36)
MCV RBC AUTO: 83.4 FL — SIGNIFICANT CHANGE UP (ref 80–100)
PLATELET # BLD AUTO: 409 K/UL — HIGH (ref 150–400)
POTASSIUM SERPL-MCNC: 3.5 MMOL/L — SIGNIFICANT CHANGE UP (ref 3.5–5.3)
POTASSIUM SERPL-SCNC: 3.5 MMOL/L — SIGNIFICANT CHANGE UP (ref 3.5–5.3)
PROT SERPL-MCNC: 6 G/DL — SIGNIFICANT CHANGE UP (ref 6–8.3)
RBC # BLD: 3.38 M/UL — LOW (ref 3.8–5.2)
RBC # FLD: 14.2 % — SIGNIFICANT CHANGE UP (ref 10.3–14.5)
SODIUM SERPL-SCNC: 131 MMOL/L — LOW (ref 135–145)
WBC # BLD: 9.8 K/UL — SIGNIFICANT CHANGE UP (ref 3.8–10.5)
WBC # FLD AUTO: 9.8 K/UL — SIGNIFICANT CHANGE UP (ref 3.8–10.5)

## 2019-09-19 PROCEDURE — 80162 ASSAY OF DIGOXIN TOTAL: CPT

## 2019-09-19 PROCEDURE — 43239 EGD BIOPSY SINGLE/MULTIPLE: CPT

## 2019-09-19 PROCEDURE — 86923 COMPATIBILITY TEST ELECTRIC: CPT

## 2019-09-19 PROCEDURE — 85610 PROTHROMBIN TIME: CPT

## 2019-09-19 PROCEDURE — 82435 ASSAY OF BLOOD CHLORIDE: CPT

## 2019-09-19 PROCEDURE — 96375 TX/PRO/DX INJ NEW DRUG ADDON: CPT

## 2019-09-19 PROCEDURE — 82962 GLUCOSE BLOOD TEST: CPT

## 2019-09-19 PROCEDURE — 82947 ASSAY GLUCOSE BLOOD QUANT: CPT

## 2019-09-19 PROCEDURE — 99233 SBSQ HOSP IP/OBS HIGH 50: CPT

## 2019-09-19 PROCEDURE — 85027 COMPLETE CBC AUTOMATED: CPT

## 2019-09-19 PROCEDURE — 86945 BLOOD PRODUCT/IRRADIATION: CPT

## 2019-09-19 PROCEDURE — 99285 EMERGENCY DEPT VISIT HI MDM: CPT | Mod: 25

## 2019-09-19 PROCEDURE — 82272 OCCULT BLD FECES 1-3 TESTS: CPT

## 2019-09-19 PROCEDURE — 86900 BLOOD TYPING SEROLOGIC ABO: CPT

## 2019-09-19 PROCEDURE — 85014 HEMATOCRIT: CPT

## 2019-09-19 PROCEDURE — 83605 ASSAY OF LACTIC ACID: CPT

## 2019-09-19 PROCEDURE — 85730 THROMBOPLASTIN TIME PARTIAL: CPT

## 2019-09-19 PROCEDURE — 82803 BLOOD GASES ANY COMBINATION: CPT

## 2019-09-19 PROCEDURE — 86850 RBC ANTIBODY SCREEN: CPT

## 2019-09-19 PROCEDURE — 88312 SPECIAL STAINS GROUP 1: CPT

## 2019-09-19 PROCEDURE — 36430 TRANSFUSION BLD/BLD COMPNT: CPT

## 2019-09-19 PROCEDURE — 96374 THER/PROPH/DIAG INJ IV PUSH: CPT

## 2019-09-19 PROCEDURE — 86901 BLOOD TYPING SEROLOGIC RH(D): CPT

## 2019-09-19 PROCEDURE — 84132 ASSAY OF SERUM POTASSIUM: CPT

## 2019-09-19 PROCEDURE — 88305 TISSUE EXAM BY PATHOLOGIST: CPT

## 2019-09-19 PROCEDURE — 80053 COMPREHEN METABOLIC PANEL: CPT

## 2019-09-19 PROCEDURE — 93005 ELECTROCARDIOGRAM TRACING: CPT

## 2019-09-19 PROCEDURE — 84295 ASSAY OF SERUM SODIUM: CPT

## 2019-09-19 PROCEDURE — 82330 ASSAY OF CALCIUM: CPT

## 2019-09-19 PROCEDURE — 83036 HEMOGLOBIN GLYCOSYLATED A1C: CPT

## 2019-09-19 PROCEDURE — P9022: CPT

## 2019-09-19 PROCEDURE — P9016: CPT

## 2019-09-19 RX ORDER — PANTOPRAZOLE SODIUM 20 MG/1
1 TABLET, DELAYED RELEASE ORAL
Qty: 60 | Refills: 0
Start: 2019-09-19 | End: 2019-10-18

## 2019-09-19 RX ORDER — IBUPROFEN 200 MG
2 TABLET ORAL
Qty: 0 | Refills: 0 | DISCHARGE

## 2019-09-19 RX ORDER — PANTOPRAZOLE SODIUM 20 MG/1
1 TABLET, DELAYED RELEASE ORAL
Qty: 0 | Refills: 0 | DISCHARGE

## 2019-09-19 RX ORDER — TRAMADOL HYDROCHLORIDE 50 MG/1
25 TABLET ORAL ONCE
Refills: 0 | Status: DISCONTINUED | OUTPATIENT
Start: 2019-09-19 | End: 2019-09-19

## 2019-09-19 RX ORDER — TRAMADOL HYDROCHLORIDE 50 MG/1
1 TABLET ORAL
Qty: 12 | Refills: 0
Start: 2019-09-19 | End: 2019-09-21

## 2019-09-19 RX ORDER — FERROUS SULFATE 325(65) MG
1 TABLET ORAL
Qty: 30 | Refills: 0
Start: 2019-09-19 | End: 2019-10-18

## 2019-09-19 RX ADMIN — Medication 0.12 MILLIGRAM(S): at 05:46

## 2019-09-19 RX ADMIN — Medication 650 MILLIGRAM(S): at 03:47

## 2019-09-19 RX ADMIN — Medication 25 MILLIGRAM(S): at 05:46

## 2019-09-19 RX ADMIN — Medication 650 MILLIGRAM(S): at 10:55

## 2019-09-19 RX ADMIN — Medication 650 MILLIGRAM(S): at 10:10

## 2019-09-19 RX ADMIN — AMLODIPINE BESYLATE 2.5 MILLIGRAM(S): 2.5 TABLET ORAL at 05:46

## 2019-09-19 RX ADMIN — PANTOPRAZOLE SODIUM 40 MILLIGRAM(S): 20 TABLET, DELAYED RELEASE ORAL at 05:46

## 2019-09-19 NOTE — DISCHARGE NOTE NURSING/CASE MANAGEMENT/SOCIAL WORK - NSPROEXTENSIONSOFSELF_GEN_A_NUR
cane/cellphone, 3 rings, toiletry bag, white sweater, black & white with red pocketbook, elizabeth sneakers,  clothing4/hearing aid

## 2019-09-19 NOTE — DISCHARGE NOTE NURSING/CASE MANAGEMENT/SOCIAL WORK - PATIENT PORTAL LINK FT
You can access the FollowMyHealth Patient Portal offered by [facility] by registering at the following website: [email or web address]. By joining [employer]’s FollowMyHealth portal, you will also be able to view your health information using other applications (apps) compatible with our system.

## 2019-09-19 NOTE — PROGRESS NOTE ADULT - ASSESSMENT
89 female PMH AF on digoxin (not on AC), HTN, HLD, CAD (on Plavix), osteopenia, PMR, remote history of duodenal ulcer, recent shingles diagnosis presents to the ED c/o low H/H as outpt. Recent onset shingles taking PO NSAIDs for pain/analgesics since 7/2019  Baseline hgb ~12, now Hgb 7.5; hemodynamically stable  Without overt/brisk GI blood loss, brown stools. Suspect occult GI blood loss anemia- UGI source     s/p EGD 9/18/19- medium HH, chronic erosive non bleeding gastritis (biopsied), duodenal erosions without bleeding      RECS:  PO diet (DASH)  BID PPI (PO)  f/u biopsy results in office, outpt follow up with Dr Mora Dejesus 9/24/19 3:30 pm [phone]  no NSAIDs (reinforced with pt)    Discussed with pt  at bedside  Discussed with Medicine team    Stable for discharge home today from GI standpoint    Fernando Salomon PA-C    [facility]  [phone]  After hours and weekend coverage [phone]

## 2019-09-19 NOTE — DISCHARGE NOTE PROVIDER - NSDCFUADDAPPT_GEN_ALL_CORE_FT
Follow up biopsy results in office, outpt follow up with Dr Mora Dejesus 9/24/19 3:30 pm [phone], no NSAIDs

## 2019-09-19 NOTE — DISCHARGE NOTE PROVIDER - CARE PROVIDER_API CALL
Gamal Martinez)  Internal Medicine; Pediatrics  [address], [address]  [place] [address]  Phone: [phone]  Fax: [phone]  Follow Up Time: Gamal Martinez)  Internal Medicine; Pediatrics  [address], [address]  [place] [address]  Phone: [phone]  Fax: [phone]  Follow Up Time:     Yolanda Velazco)  Gastroenterology; Internal Medicine  [address], [facility] [address] [facility] [address]  [place], [place] [id number]  Phone: [phone]  Fax: [phone]  Follow Up Time:

## 2019-09-19 NOTE — DISCHARGE NOTE PROVIDER - HOSPITAL COURSE
89 female PMH AF on digoxin (not on AC), HTN, HLD, CAD (on Plavix), osteopenia, PMR, remote history of duodenal ulcer, recent shingles diagnosis presents to the ED c/o low H/H as outpt. Recent onset shingles taking PO NSAIDs for pain/analgesics since 7/2019    Baseline hgb ~12, now Hgb 7.5; hemodynamically stable    Without overt/brisk GI blood loss, brown stools. Suspect occult GI blood loss anemia- UGI source         s/p EGD 9/18/19- medium HH, chronic erosive non bleeding gastritis (biopsied), duodenal erosions without bleeding        f/u biopsy results in office, outpt follow up with Dr Mora Dejesus 9/24/19 3:30 pm [phone]    no NSAIDs (reinforced with pt)

## 2019-09-19 NOTE — DISCHARGE NOTE NURSING/CASE MANAGEMENT/SOCIAL WORK - NSDCFUADDAPPT_GEN_ALL_CORE_FT
Follow up biopsy results in office, outpt follow up with Dr Mora Dejesus 9/24/19 3:30 pm [phone], no NSAIDs

## 2019-09-19 NOTE — DISCHARGE NOTE PROVIDER - CARE PROVIDERS DIRECT ADDRESSES
,DirectAddress_Unknown ,DirectAddress_Unknown,[email or web address]

## 2019-09-19 NOTE — PROGRESS NOTE ADULT - SUBJECTIVE AND OBJECTIVE BOX
Patient is a 89y old  Female who presentsed with a chief complaint of severe anemia (19 Sep 2019 10:01)      INTERVAL HPI/OVERNIGHT EVENTS:  no overnight events  no abdominal pain, nausea or vomiting  s/p 2 units PRBCs yesterday  no CP or SOB  no rectal bleeding or melena    s/p EGD yesterday - medium HH, chronic erosive non bleeding gastritis (biopsied), duodenal erosions without bleeding      MEDICATIONS  (STANDING):  amLODIPine   Tablet 2.5 milliGRAM(s) Oral daily  dextrose 5%. 1000 milliLiter(s) (50 mL/Hr) IV Continuous <Continuous>  dextrose 50% Injectable 12.5 Gram(s) IV Push once  dextrose 50% Injectable 25 Gram(s) IV Push once  dextrose 50% Injectable 25 Gram(s) IV Push once  digoxin     Tablet 0.125 milliGRAM(s) Oral daily  insulin lispro (HumaLOG) corrective regimen sliding scale   SubCutaneous three times a day before meals  metoprolol succinate ER 25 milliGRAM(s) Oral daily  nortriptyline 25 milliGRAM(s) Oral at bedtime  pantoprazole    Tablet 40 milliGRAM(s) Oral two times a day  simvastatin 40 milliGRAM(s) Oral at bedtime    MEDICATIONS  (PRN):  acetaminophen   Tablet .. 650 milliGRAM(s) Oral every 4 hours PRN Moderate Pain (4 - 6)  dextrose 40% Gel 15 Gram(s) Oral once PRN Blood Glucose LESS THAN 70 milliGRAM(s)/deciliter  glucagon  Injectable 1 milliGRAM(s) IntraMuscular once PRN Glucose LESS THAN 70 milligrams/deciliter      Allergies  penicillin (Unknown)      Review of Systems:  General:  No wt loss, fevers, chills, night sweats, +fatigue,   CV:  No pain, palpitations, +HTN +CAD +AF (on digoxin, no AC)  Resp:  No dyspnea, cough, tachypnea, wheezing  GI: see HPI  :  No pain, bleeding, incontinence, nocturia  Muscle:  +PMR, left chest wall pain 2/2 shingles  Neuro:  No weakness, tingling, memory problems +hearing loss  Psych:  No fatigue, insomnia, mood problems, depression  Endocrine:  No polyuria, polydypsia, cold/heat intolerance  Heme:  No petechiae, ecchymosis, easy bruisability  Skin:  No tattoos, scars, edema +shingles    Vital Signs Last 24 Hrs  T(C): 36.9 (19 Sep 2019 08:21), Max: 37.6 (18 Sep 2019 18:00)  T(F): 98.4 (19 Sep 2019 08:21), Max: 99.7 (18 Sep 2019 18:00)  HR: 66 (19 Sep 2019 08:21) (66 - 88)  BP: 131/54 (19 Sep 2019 08:21) (131/54 - 166/61)  BP(mean): --  RR: 20 (19 Sep 2019 08:21) (18 - 20)  SpO2: 92% (19 Sep 2019 08:21) (92% - 96%)    PHYSICAL EXAM:  Constitutional: NAD, well-developed pleasant WF appears younger than stated age  Neck: No LAD, supple no JVD  Respiratory: clear b/l  Cardiovascular: S1 and S2, RRR  Gastrointestinal: BS+, soft, NT/ND, neg HSM,  Extremities: No peripheral edema, neg clubbing, cyanosis  Vascular: 2+ peripheral pulses  Neurological: A/O x 3, no focal deficits  Psychiatric: Normal mood, normal affect  Skin: +left lower chest wall along anterior axillary line, band-aid over area of shingles. +tender to light touch/palpation in surrounding area  Lidoderm patch in place lateral to lesions/bandaid    LABS:                        9.2    9.80  )-----------( 409      ( 19 Sep 2019 08:41 )             28.2     Hemoglobin: 9.7 g/dL <L> [11.5 - 15.5] (09-18 @ 23:22)  s/p 2 units PRBCs  Hemoglobin: 7.5 g/dL <L> [11.5 - 15.5] (09-18 @ 08:17)    Occult Blood, Feces (09.18.19 @ 09:01)    Occult Blood, Feces: Negative      09-19    131<L>  |  93<L>  |  17  ----------------------------<  104<H>  3.5   |  26  |  0.71    Ca    9.6      19 Sep 2019 06:18      PT/INR - ( 18 Sep 2019 08:17 )   PT: 14.5 sec;   INR: 1.25 ratio         PTT - ( 18 Sep 2019 08:17 )  PTT:34.0 sec    LIVER FUNCTIONS - ( 19 Sep 2019 06:18 )  Alb: 2.6 g/dL / Pro: 6.0 g/dL / ALK PHOS: 101 U/L / ALT: 8 U/L / AST: 9 U/L / GGT: x             RADIOLOGY & ADDITIONAL TESTS:
Pre-Endoscopy Evaluation      Referring Physician: dr. keyla barnett                                   Procedure:  upper gastrointestinal endoscopy     Indication for Procedure: anemia, r/o gib    Pertinent History: 89 female PMH AF on digoxin (not on AC), HTN, HLD, CAD (on Plavix), osteopenia, PMR, remote history of duodenal ulcer, recent shingles diagnosis presents to the ED c/o low H/H as outpt. Recent onset shingles taking PO NSAIDs for pain/analgesics since 7/2019  Baseline hgb ~12      Sedation by Anesthesia [x]    PAST MEDICAL & SURGICAL HISTORY:  Unsteady gait  Osteopenia  Injury of head, subsequent encounter: s/p fall 2 years ago   had bleed into area now ok.  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, estrogen receptor positive, unspecified site of breast: ER/CO positive  HER Negative (per family)  Polymyalgia rheumatica  CAD (coronary artery disease)  Bleeding Duodenal Ulcer: resolved several years ago  Age-Related Hearing Loss: wears hearing aids  Hypercholesteremia  HTN (Hypertension)  Atrial Fibrillation  Closed fracture of patella: left   surgery with pins/screws  4- 5 years ago  S/P colonoscopy: 8 years ago negative  S/P breast biopsy, left  Atherosclerotic Coronary Vascular Disease: coronary stent placement in 2004  Stented Coronary Artery: PCI X 1  Bell's Palsy    PMH of Gastroparesis [ ]  Gastric Surgery [ ]  Gastric Outlet Obstruction [ ]    Allergies:    penicillin (Unknown)    Intolerances:    Latex allergy: [ ] yes [x] no    Medications:MEDICATIONS  (STANDING):  amLODIPine   Tablet 2.5 milliGRAM(s) Oral daily  dextrose 5%. 1000 milliLiter(s) (50 mL/Hr) IV Continuous <Continuous>  dextrose 50% Injectable 12.5 Gram(s) IV Push once  dextrose 50% Injectable 25 Gram(s) IV Push once  dextrose 50% Injectable 25 Gram(s) IV Push once  digoxin     Tablet 0.125 milliGRAM(s) Oral daily  insulin lispro (HumaLOG) corrective regimen sliding scale   SubCutaneous three times a day before meals  metoprolol succinate ER 25 milliGRAM(s) Oral daily  morphine  - Injectable 2 milliGRAM(s) IV Push once  nortriptyline 25 milliGRAM(s) Oral at bedtime  simvastatin 40 milliGRAM(s) Oral at bedtime    MEDICATIONS  (PRN):  acetaminophen   Tablet .. 650 milliGRAM(s) Oral every 4 hours PRN Moderate Pain (4 - 6)  dextrose 40% Gel 15 Gram(s) Oral once PRN Blood Glucose LESS THAN 70 milliGRAM(s)/deciliter  glucagon  Injectable 1 milliGRAM(s) IntraMuscular once PRN Glucose LESS THAN 70 milligrams/deciliter      Smoking: [ ] yes  [x] no    AICD/PPM: [ ] yes   [x] no    Pertinent lab data:                        7.5    12.2  )-----------( 554      ( 18 Sep 2019 08:17 )             24.4     09-18    131<L>  |  90<L>  |  22  ----------------------------<  206<H>  4.2   |  25  |  0.68    Ca    10.3      18 Sep 2019 08:17    TPro  7.3  /  Alb  3.1<L>  /  TBili  0.3  /  DBili  x   /  AST  15  /  ALT  15  /  AlkPhos  130<H>  09-18    PT/INR - ( 18 Sep 2019 08:17 )   PT: 14.5 sec;   INR: 1.25 ratio       PTT - ( 18 Sep 2019 08:17 )  PTT:34.0 sec      < from: Transthoracic Echocardiogram w/ Doppler (06.29.10 @ 07:43) >  Ejection Fraction: 75 %  Doppler Peak Velocity (m/sec): AoV=1.6  ------------------------------------------------------------------------  Observations:  Mitral Valve: Mitral annular calcification.  Aortic Valve/Aorta: Normal trileaflet aortic valve.  Normal aortic root.  Left Atrium: Normal left atrium.  Left Ventricle: Normal left ventricular internal dimensions  and wall thicknesses.  Normal left ventricular systolic function. Mild diastolic  dysfunction (Stage I).  Right Heart: Normal right atrium.  Normal right ventricular size and systolic function.  Normal tricuspid and pulmonic valves.  Pericardium/Pleura: Trace pericardial effusion.  Doppler:Mild mitral regurgitation.  Mild tricuspid regurgitation. Estimated pulmonary artery  systolic pressure equals 42 mm Hg, assuming right atrial  pressure equals 10  mm Hg, consistent with mild pulmonary  hypertension.  Minimal pulmonic regurgitation.  ------------------------------------------------------------------------  Conclusions:  1. Mild mitral regurgitation.  2. Normal left ventricular systolic function. Mild  diastolic dysfunction (Stage I).  3. Normal right ventricular size and systolic function.  4. Mild tricuspid regurgitation. Estimated pulmonary artery  systolic pressure equals 42 mm Hg, assuming right atrial  pressure equals 10  mm Hg, consistent with mild pulmonary  hypertension.  ---------------------------------------------------          Physical Examination:  Daily Height in cm: 147.32 (18 Sep 2019 06:59)    Daily   Vital Signs Last 24 Hrs  T(C): 37.1 (18 Sep 2019 13:00), Max: 37.2 (18 Sep 2019 09:45)  T(F): 98.8 (18 Sep 2019 13:00), Max: 99 (18 Sep 2019 09:45)  HR: 84 (18 Sep 2019 13:00) (84 - 107)  BP: 160/81 (18 Sep 2019 13:00) (140/67 - 168/70)  BP(mean): --  RR: 18 (18 Sep 2019 13:00) (17 - 18)  SpO2: 95% (18 Sep 2019 13:00) (94% - 95%)    Drug Dosing Weight  Height (cm): 147.32 (18 Sep 2019 06:59)  Weight (kg): 44 (18 Sep 2019 06:59)  BMI (kg/m2): 20.3 (18 Sep 2019 06:59)  BSA (m2): 1.34 (18 Sep 2019 06:59)    Constitutional: NAD    Neck:  No JVD    Respiratory: CTAB/L    Cardiovascular: S1 and S2    Gastrointestinal: BS+, soft, NT/ND    Extremities: No peripheral edema    Neurological: A/O x 3    : No [place]    Skin: No rashes    Comments: 2nd unit prbcs in progress on arrival to endoscopy unit      The patient is a suitable candidate for the planned procedure unless box checked [ ]  No, explain:
Subjective: no new co    Exam:  Gen: AA NAD  Neck: no JVD  Chest: normal shape and expansion  Heart: RRR s M  Lungs: CTAB  Abdomen: Soft, NT, ND, no HSM, normoactive BS x 4  Ext: no CCE  Skin: no rash    Vital Signs Last 24 Hrs  T(C): 37.3 (19 Sep 2019 04:02), Max: 37.6 (18 Sep 2019 18:00)  T(F): 99.1 (19 Sep 2019 04:02), Max: 99.7 (18 Sep 2019 18:00)  HR: 76 (19 Sep 2019 05:48) (75 - 88)  BP: 134/66 (19 Sep 2019 05:48) (134/66 - 166/61)  BP(mean): --  RR: 18 (19 Sep 2019 05:48) (17 - 18)  SpO2: 96% (19 Sep 2019 05:48) (95% - 96%)    Daily Height in cm: 147.32 (19 Sep 2019 04:02)    Daily     I&O's Detail    18 Sep 2019 07:01  -  19 Sep 2019 07:00  --------------------------------------------------------  IN:    Oral Fluid: 100 mL  Total IN: 100 mL    OUT:  Total OUT: 0 mL    Total NET: 100 mL          Daily     CAPILLARY BLOOD GLUCOSE      POCT Blood Glucose.: 173 mg/dL (18 Sep 2019 21:27)  POCT Blood Glucose.: 138 mg/dL (18 Sep 2019 18:17)      MEDICATIONS  (STANDING):  amLODIPine   Tablet 2.5 milliGRAM(s) Oral daily  dextrose 5%. 1000 milliLiter(s) (50 mL/Hr) IV Continuous <Continuous>  dextrose 50% Injectable 12.5 Gram(s) IV Push once  dextrose 50% Injectable 25 Gram(s) IV Push once  dextrose 50% Injectable 25 Gram(s) IV Push once  digoxin     Tablet 0.125 milliGRAM(s) Oral daily  insulin lispro (HumaLOG) corrective regimen sliding scale   SubCutaneous three times a day before meals  metoprolol succinate ER 25 milliGRAM(s) Oral daily  nortriptyline 25 milliGRAM(s) Oral at bedtime  pantoprazole    Tablet 40 milliGRAM(s) Oral two times a day  simvastatin 40 milliGRAM(s) Oral at bedtime    MEDICATIONS  (PRN):  acetaminophen   Tablet .. 650 milliGRAM(s) Oral every 4 hours PRN Moderate Pain (4 - 6)  dextrose 40% Gel 15 Gram(s) Oral once PRN Blood Glucose LESS THAN 70 milliGRAM(s)/deciliter  glucagon  Injectable 1 milliGRAM(s) IntraMuscular once PRN Glucose LESS THAN 70 milligrams/deciliter      Labs:                        9.7    11.8  )-----------( 421      ( 18 Sep 2019 23:22 )             30.4                         7.5    12.2  )-----------( 554      ( 18 Sep 2019 08:17 )             24.4            PT/INR - ( 18 Sep 2019 08:17 )   PT: 14.5 sec;   INR: 1.25 ratio         PTT - ( 18 Sep 2019 08:17 )  PTT:34.0 sec  09-19    131<L>  |  93<L>  |  17  ----------------------------<  104<H>  3.5   |  26  |  0.71    Ca    9.6      19 Sep 2019 06:18    TPro  6.0  /  Alb  2.6<L>  /  TBili  0.4  /  DBili  x   /  AST  9<L>  /  ALT  8<L>  /  AlkPhos  101  09-19      < from: Upper Endoscopy (09.18.19 @ 14:10) >  Findings:       A medium-sized hiatus hernia was present.       Patchy severe inflammation characterized by erosions and shallow ulcerations was found in the        gastric antrum. No active or pending bleeding noted. Biopsies were taken with a cold forceps        for Helicobacter pylori testing. Estimated blood loss: none.       Multiple diffuse superficial erosions without bleeding were found in the duodenal bulb.                                                                                                        Impression:          - Medium-sized hiatus hernia.                       - Chronic Erosive Non Bleeding gastritis. Biopsied. Likley NSAID induced                       - Duodenal erosions without bleeding.  Recommendation:      - Advance diet as tolerated.                       - Avoid NSAIDAS                       - Recommend acid suppression medication.                                                                                                        Attending Participation:       I personally performed the entire procedure.                                                __________________  Robert Brunner, MD  9/18/2019 2:46:35 PM  This report has been signed electronically.  Number of Addenda: 0    Note Initiated On: 9/18/2019 2:10 PM    < end of copied text >

## 2019-09-19 NOTE — DISCHARGE NOTE NURSING/CASE MANAGEMENT/SOCIAL WORK - NSDCPEWEB_GEN_ALL_CORE
[facility] for Tobacco Control website --- [email or web address] --- [email or web address]

## 2019-09-19 NOTE — DISCHARGE NOTE PROVIDER - NSDCCPCAREPLAN_GEN_ALL_CORE_FT
PRINCIPAL DISCHARGE DIAGNOSIS  Diagnosis: GI bleed  Assessment and Plan of Treatment: There are 2 common types of GI Bleed, Upper GI Bleed and Lower GI Bleed.  Upper GI Bleed affects the esophagus, stomach, and first part of the small intestine. Lower GI Bleed affects the colon and rectum.  Upper GI Bleed signs and symptoms to notify your Health Care Provider are vomiting blood, or coffee ground vomitus, and bowel movements that look like black tar.  Lower GI Bleed signs and symptoms to notify your health care provider are bright red bloody bowel movements.   Take your medications as prescribed by your Gastroenterologist.  If you have had an Endoscopy or Colonoscopy, follow up with your Gastroenterologist for Pathology results.  Avoid NSAIDs unless your Health Care Provider tells you that it is ok (Aspirin, Ibuprofen, Advil, Motrin, Aleve).  Follow up with your Gastroenterologist within 1-2 weeks of discharge.      SECONDARY DISCHARGE DIAGNOSES  Diagnosis: Gastritis  Assessment and Plan of Treatment: Call your healthcare provider:  If you have belly pain that gets worse or doesn't go away  If you vomit blood or have black bowel movements  If you are losing weight (without trying)  Avoid NSAIDs- (Aspirin, Ibuprofen, Advil, Motrin, Naproxen, Aleve)  Avoid alcoholic beverages  Take all medicaions as prescribed.  Call your healthcare provider for a follow-up appointment within one week

## 2019-09-20 LAB — SURGICAL PATHOLOGY STUDY: SIGNIFICANT CHANGE UP

## 2019-10-04 ENCOUNTER — RX RENEWAL (OUTPATIENT)
Age: 84
End: 2019-10-04

## 2019-10-11 ENCOUNTER — INPATIENT (INPATIENT)
Facility: HOSPITAL | Age: 84
LOS: 23 days | Discharge: NURSING, REHAB OR LONG-TERM CARE | DRG: 377 | End: 2019-11-04
Attending: INTERNAL MEDICINE | Admitting: INTERNAL MEDICINE
Payer: MEDICARE

## 2019-10-11 VITALS
RESPIRATION RATE: 18 BRPM | HEIGHT: 60 IN | TEMPERATURE: 98 F | HEART RATE: 95 BPM | WEIGHT: 91.93 LBS | DIASTOLIC BLOOD PRESSURE: 73 MMHG | OXYGEN SATURATION: 95 % | SYSTOLIC BLOOD PRESSURE: 169 MMHG

## 2019-10-11 DIAGNOSIS — S82.009A UNSPECIFIED FRACTURE OF UNSPECIFIED PATELLA, INITIAL ENCOUNTER FOR CLOSED FRACTURE: Chronic | ICD-10-CM

## 2019-10-11 DIAGNOSIS — D64.9 ANEMIA, UNSPECIFIED: ICD-10-CM

## 2019-10-11 DIAGNOSIS — Z98.890 OTHER SPECIFIED POSTPROCEDURAL STATES: Chronic | ICD-10-CM

## 2019-10-11 LAB
ALBUMIN SERPL ELPH-MCNC: 2.8 G/DL — LOW (ref 3.3–5)
ALP SERPL-CCNC: 196 U/L — HIGH (ref 40–120)
ALT FLD-CCNC: 35 U/L — SIGNIFICANT CHANGE UP (ref 10–45)
ANION GAP SERPL CALC-SCNC: 14 MMOL/L — SIGNIFICANT CHANGE UP (ref 5–17)
APTT BLD: 37.2 SEC — HIGH (ref 27.5–36.3)
AST SERPL-CCNC: 26 U/L — SIGNIFICANT CHANGE UP (ref 10–40)
BASOPHILS # BLD AUTO: 0.03 K/UL — SIGNIFICANT CHANGE UP (ref 0–0.2)
BASOPHILS NFR BLD AUTO: 0.2 % — SIGNIFICANT CHANGE UP (ref 0–2)
BILIRUB SERPL-MCNC: 0.2 MG/DL — SIGNIFICANT CHANGE UP (ref 0.2–1.2)
BLD GP AB SCN SERPL QL: NEGATIVE — SIGNIFICANT CHANGE UP
BUN SERPL-MCNC: 21 MG/DL — SIGNIFICANT CHANGE UP (ref 7–23)
CALCIUM SERPL-MCNC: 10 MG/DL — SIGNIFICANT CHANGE UP (ref 8.4–10.5)
CHLORIDE SERPL-SCNC: 96 MMOL/L — SIGNIFICANT CHANGE UP (ref 96–108)
CO2 SERPL-SCNC: 25 MMOL/L — SIGNIFICANT CHANGE UP (ref 22–31)
CREAT SERPL-MCNC: 0.59 MG/DL — SIGNIFICANT CHANGE UP (ref 0.5–1.3)
EOSINOPHIL # BLD AUTO: 0.07 K/UL — SIGNIFICANT CHANGE UP (ref 0–0.5)
EOSINOPHIL NFR BLD AUTO: 0.5 % — SIGNIFICANT CHANGE UP (ref 0–6)
GLUCOSE SERPL-MCNC: 128 MG/DL — HIGH (ref 70–99)
HCT VFR BLD CALC: 25.2 % — LOW (ref 34.5–45)
HGB BLD-MCNC: 7.7 G/DL — LOW (ref 11.5–15.5)
IMM GRANULOCYTES NFR BLD AUTO: 1.1 % — SIGNIFICANT CHANGE UP (ref 0–1.5)
INR BLD: 1.36 RATIO — HIGH (ref 0.88–1.16)
LIDOCAIN IGE QN: 16 U/L — SIGNIFICANT CHANGE UP (ref 7–60)
LYMPHOCYTES # BLD AUTO: 1.47 K/UL — SIGNIFICANT CHANGE UP (ref 1–3.3)
LYMPHOCYTES # BLD AUTO: 10.1 % — LOW (ref 13–44)
MCHC RBC-ENTMCNC: 25.8 PG — LOW (ref 27–34)
MCHC RBC-ENTMCNC: 30.6 GM/DL — LOW (ref 32–36)
MCV RBC AUTO: 84.6 FL — SIGNIFICANT CHANGE UP (ref 80–100)
MONOCYTES # BLD AUTO: 1.48 K/UL — HIGH (ref 0–0.9)
MONOCYTES NFR BLD AUTO: 10.2 % — SIGNIFICANT CHANGE UP (ref 2–14)
NEUTROPHILS # BLD AUTO: 11.36 K/UL — HIGH (ref 1.8–7.4)
NEUTROPHILS NFR BLD AUTO: 77.9 % — HIGH (ref 43–77)
NRBC # BLD: 0 /100 WBCS — SIGNIFICANT CHANGE UP (ref 0–0)
OB PNL STL: POSITIVE
PLATELET # BLD AUTO: 652 K/UL — HIGH (ref 150–400)
POTASSIUM SERPL-MCNC: 4.6 MMOL/L — SIGNIFICANT CHANGE UP (ref 3.5–5.3)
POTASSIUM SERPL-SCNC: 4.6 MMOL/L — SIGNIFICANT CHANGE UP (ref 3.5–5.3)
PROT SERPL-MCNC: 7.2 G/DL — SIGNIFICANT CHANGE UP (ref 6–8.3)
PROTHROM AB SERPL-ACNC: 15.6 SEC — HIGH (ref 10–12.9)
RBC # BLD: 2.98 M/UL — LOW (ref 3.8–5.2)
RBC # FLD: 14 % — SIGNIFICANT CHANGE UP (ref 10.3–14.5)
RH IG SCN BLD-IMP: POSITIVE — SIGNIFICANT CHANGE UP
SODIUM SERPL-SCNC: 135 MMOL/L — SIGNIFICANT CHANGE UP (ref 135–145)
WBC # BLD: 14.57 K/UL — HIGH (ref 3.8–10.5)
WBC # FLD AUTO: 14.57 K/UL — HIGH (ref 3.8–10.5)

## 2019-10-11 PROCEDURE — 71045 X-RAY EXAM CHEST 1 VIEW: CPT | Mod: 26

## 2019-10-11 PROCEDURE — 99285 EMERGENCY DEPT VISIT HI MDM: CPT

## 2019-10-11 PROCEDURE — 93010 ELECTROCARDIOGRAM REPORT: CPT

## 2019-10-11 RX ORDER — ACETAMINOPHEN 500 MG
650 TABLET ORAL ONCE
Refills: 0 | Status: COMPLETED | OUTPATIENT
Start: 2019-10-11 | End: 2019-10-11

## 2019-10-11 NOTE — ED ADULT NURSE NOTE - PMH
Age-Related Hearing Loss  wears hearing aids  Atrial Fibrillation    Bleeding Duodenal Ulcer  resolved several years ago  CAD (coronary artery disease)    Fall, subsequent encounter  2 years  HTN (Hypertension)    Hypercholesteremia    Injury of head, subsequent encounter  s/p fall 2 years ago   had bleed into area now ok.  Malignant neoplasm of left breast in female, estrogen receptor positive, unspecified site of breast  ER/GA positive  HER Negative (per family)  Osteopenia    Polymyalgia rheumatica    Unsteady gait

## 2019-10-11 NOTE — ED ADULT NURSE NOTE - NSIMPLEMENTINTERV_GEN_ALL_ED
Implemented All Universal Safety Interventions:  [place] to call system. Call bell, personal items and telephone within reach. Instruct patient to call for assistance. Room bathroom lighting operational. Non-slip footwear when patient is off stretcher. Physically safe environment: no spills, clutter or unnecessary equipment. Stretcher in lowest position, wheels locked, appropriate side rails in place.

## 2019-10-11 NOTE — ED PROVIDER NOTE - OBJECTIVE STATEMENT
89 yo female with pmh htn. hld, dm, gastric ulcer, anemia presenting from PMDs office for evaluation of worsening anemia. Pt recent hospitalized 3 weeks ago for anemia hgb in 7, endoscopy done showing gastric ulcers. Pt had repeat hgb today which showed hgb again 7.1. {t endorses generalized weakness and epigastric abd pain, no nausea, vomiting, diarrhea, dark stools. 89 yo female with pmh htn. hld, dm, gastric ulcer, anemia presenting from PMDs office for evaluation of worsening anemia. Pt recent hospitalized 3 weeks ago for anemia hgb in 7, endoscopy done showing gastric ulcers. Pt had repeat hgb today which showed hgb again 7.1. pt endorses generalized weakness and epigastric abd pain, no nausea, vomiting, diarrhea, dark stools.

## 2019-10-11 NOTE — ED PROVIDER NOTE - PHYSICAL EXAMINATION
A&Ox3, NAD. NCAT. PERRL, EOMI. MM pale, dry. Neck supple, no LAD. Lungs CTAB. +S1S2, RRR, + systolic murmur, no r/g. Abd soft, NT/ND, +BS, no rebound or guarding. Extremities: cap refill <2, pulses in distal extremities 4+, no edema. Skin without rash. CN II-XII intact. Strength 5/5 UE/LE. Sensations intact throughout. : + dark soft stool, no gross blood, rectum nontender no hemorrhoids no fissures or masses.

## 2019-10-11 NOTE — ED ADULT NURSE NOTE - OBJECTIVE STATEMENT
2120 pt 89yf aox4 c/o "feeling tired" sent by pmd for low h/h bld transfusion, pt able to verbalize concerns, in no acute distress pending labs, tests/family at bedside/gc

## 2019-10-11 NOTE — ED PROVIDER NOTE - ATTENDING CONTRIBUTION TO CARE
Sweta Rodriguez M.D: 89F hx htn hld dm gastric ulcer, anemia, recent admission for anemia presenting with 4 days of increasing fatigue and weakness found to have recurrent anemia on outpt labs to 7.1. son at bedside providing history, notes pt was not like this when she was admitted last time for anemia, notes significant decompensation over the last few days, intermittently confuwsed, very weak, cannot get up and walk around on her own. no cp no sob no abd pain no n/v/c/d. on exam pt nontoxic appearing, pallorous, abd soft ntnd lungs ctab heart rrr. rectal exam performed by ADRIANNA Pichardo with dark stool (unclear in setting of iron usage), rectal temp 100.2. neuro exam nonfocal, moving all extremities spontaneously. suspect weakness/fatigue is multifactorial, partially due to anemia, partially due to likely underlying infectious cause. no cough/rhinorrhea/ uri symptoms to suggest pna; with confusion as well suspect uti will check urine, cultures, will hold off on abx at this time given pt does not meet sepsis criteria and no clear source. per discussion with dr barnett will hold off on abx, transfuse 1 unit prbc if hb still 7, and admit to his servcie for further care. do not suspect meningitis-- neck supple, full rom without issue.

## 2019-10-11 NOTE — ED CLERICAL - NS ED CLERK NOTE PRE-ARRIVAL INFORMATION; ADDITIONAL PRE-ARRIVAL INFORMATION
CC/Reason For referral:  GI BLEED. Hbg 7.2.  NEEDS TYPE & SCREEN AND TRANSFUSION  Preferred Consultant(if applicable):  DR GARCIA  Who admits for you (if needed):   Do you have documents you would like to fax over? NO  Would you still like to speak to an ED attending? PLEASE CALL AFTER PATIENT IS SEEN

## 2019-10-11 NOTE — ED PROVIDER NOTE - PROGRESS NOTE DETAILS
Spoke with Dr. Martinez, discussed hgb 7.7, would like one unit of blood here and admit to his service. discussed rectal temp of 100.2 and awaiting UA/ CXR. -Lula Pichardo PA-C

## 2019-10-12 DIAGNOSIS — I48.91 UNSPECIFIED ATRIAL FIBRILLATION: ICD-10-CM

## 2019-10-12 DIAGNOSIS — E11.69 TYPE 2 DIABETES MELLITUS WITH OTHER SPECIFIED COMPLICATION: ICD-10-CM

## 2019-10-12 DIAGNOSIS — R50.9 FEVER, UNSPECIFIED: ICD-10-CM

## 2019-10-12 DIAGNOSIS — R09.89 OTHER SPECIFIED SYMPTOMS AND SIGNS INVOLVING THE CIRCULATORY AND RESPIRATORY SYSTEMS: ICD-10-CM

## 2019-10-12 DIAGNOSIS — E03.9 HYPOTHYROIDISM, UNSPECIFIED: ICD-10-CM

## 2019-10-12 DIAGNOSIS — E11.9 TYPE 2 DIABETES MELLITUS WITHOUT COMPLICATIONS: ICD-10-CM

## 2019-10-12 DIAGNOSIS — I10 ESSENTIAL (PRIMARY) HYPERTENSION: ICD-10-CM

## 2019-10-12 DIAGNOSIS — B02.29 OTHER POSTHERPETIC NERVOUS SYSTEM INVOLVEMENT: ICD-10-CM

## 2019-10-12 DIAGNOSIS — K27.9 PEPTIC ULCER, SITE UNSPECIFIED, UNSPECIFIED AS ACUTE OR CHRONIC, WITHOUT HEMORRHAGE OR PERFORATION: ICD-10-CM

## 2019-10-12 DIAGNOSIS — I25.10 ATHEROSCLEROTIC HEART DISEASE OF NATIVE CORONARY ARTERY WITHOUT ANGINA PECTORIS: ICD-10-CM

## 2019-10-12 DIAGNOSIS — I48.0 PAROXYSMAL ATRIAL FIBRILLATION: ICD-10-CM

## 2019-10-12 DIAGNOSIS — D64.9 ANEMIA, UNSPECIFIED: ICD-10-CM

## 2019-10-12 DIAGNOSIS — Z29.9 ENCOUNTER FOR PROPHYLACTIC MEASURES, UNSPECIFIED: ICD-10-CM

## 2019-10-12 LAB
ANION GAP SERPL CALC-SCNC: 12 MMOL/L — SIGNIFICANT CHANGE UP (ref 5–17)
APPEARANCE UR: CLEAR — SIGNIFICANT CHANGE UP
BACTERIA # UR AUTO: NEGATIVE — SIGNIFICANT CHANGE UP
BASOPHILS # BLD AUTO: 0 K/UL — SIGNIFICANT CHANGE UP (ref 0–0.2)
BASOPHILS NFR BLD AUTO: 0 % — SIGNIFICANT CHANGE UP (ref 0–2)
BILIRUB UR-MCNC: NEGATIVE — SIGNIFICANT CHANGE UP
BUN SERPL-MCNC: 17 MG/DL — SIGNIFICANT CHANGE UP (ref 7–23)
CALCIUM SERPL-MCNC: 10.8 MG/DL — HIGH (ref 8.4–10.5)
CHLORIDE SERPL-SCNC: 95 MMOL/L — LOW (ref 96–108)
CO2 SERPL-SCNC: 27 MMOL/L — SIGNIFICANT CHANGE UP (ref 22–31)
COLOR SPEC: SIGNIFICANT CHANGE UP
CREAT SERPL-MCNC: 0.57 MG/DL — SIGNIFICANT CHANGE UP (ref 0.5–1.3)
CULTURE RESULTS: SIGNIFICANT CHANGE UP
DIFF PNL FLD: NEGATIVE — SIGNIFICANT CHANGE UP
DIGOXIN SERPL-MCNC: 2.7 NG/ML — CRITICAL HIGH (ref 0.8–2)
EOSINOPHIL # BLD AUTO: 0 K/UL — SIGNIFICANT CHANGE UP (ref 0–0.5)
EOSINOPHIL NFR BLD AUTO: 0 % — SIGNIFICANT CHANGE UP (ref 0–6)
EPI CELLS # UR: 0 /HPF — SIGNIFICANT CHANGE UP
GLUCOSE BLDC GLUCOMTR-MCNC: 120 MG/DL — HIGH (ref 70–99)
GLUCOSE BLDC GLUCOMTR-MCNC: 138 MG/DL — HIGH (ref 70–99)
GLUCOSE BLDC GLUCOMTR-MCNC: 187 MG/DL — HIGH (ref 70–99)
GLUCOSE SERPL-MCNC: 124 MG/DL — HIGH (ref 70–99)
GLUCOSE UR QL: NEGATIVE — SIGNIFICANT CHANGE UP
HCT VFR BLD CALC: 32.3 % — LOW (ref 34.5–45)
HCT VFR BLD CALC: 34.7 % — SIGNIFICANT CHANGE UP (ref 34.5–45)
HGB BLD-MCNC: 10.3 G/DL — LOW (ref 11.5–15.5)
HGB BLD-MCNC: 11.4 G/DL — LOW (ref 11.5–15.5)
HYALINE CASTS # UR AUTO: 0 /LPF — SIGNIFICANT CHANGE UP (ref 0–2)
KETONES UR-MCNC: NEGATIVE — SIGNIFICANT CHANGE UP
LEUKOCYTE ESTERASE UR-ACNC: NEGATIVE — SIGNIFICANT CHANGE UP
LYMPHOCYTES # BLD AUTO: 1.46 K/UL — SIGNIFICANT CHANGE UP (ref 1–3.3)
LYMPHOCYTES # BLD AUTO: 10.2 % — LOW (ref 13–44)
MAGNESIUM SERPL-MCNC: 1.9 MG/DL — SIGNIFICANT CHANGE UP (ref 1.6–2.6)
MCHC RBC-ENTMCNC: 27.2 PG — SIGNIFICANT CHANGE UP (ref 27–34)
MCHC RBC-ENTMCNC: 27.3 PG — SIGNIFICANT CHANGE UP (ref 27–34)
MCHC RBC-ENTMCNC: 31.9 GM/DL — LOW (ref 32–36)
MCHC RBC-ENTMCNC: 32.9 GM/DL — SIGNIFICANT CHANGE UP (ref 32–36)
MCV RBC AUTO: 83.2 FL — SIGNIFICANT CHANGE UP (ref 80–100)
MCV RBC AUTO: 85.2 FL — SIGNIFICANT CHANGE UP (ref 80–100)
MONOCYTES # BLD AUTO: 0.97 K/UL — HIGH (ref 0–0.9)
MONOCYTES NFR BLD AUTO: 6.8 % — SIGNIFICANT CHANGE UP (ref 2–14)
NEUTROPHILS # BLD AUTO: 11.89 K/UL — HIGH (ref 1.8–7.4)
NEUTROPHILS NFR BLD AUTO: 83 % — HIGH (ref 43–77)
NITRITE UR-MCNC: NEGATIVE — SIGNIFICANT CHANGE UP
NRBC # BLD: 0 /100 WBCS — SIGNIFICANT CHANGE UP (ref 0–0)
PH UR: 6.5 — SIGNIFICANT CHANGE UP (ref 5–8)
PLATELET # BLD AUTO: 636 K/UL — HIGH (ref 150–400)
PLATELET # BLD AUTO: 736 K/UL — HIGH (ref 150–400)
POTASSIUM SERPL-MCNC: 4.6 MMOL/L — SIGNIFICANT CHANGE UP (ref 3.5–5.3)
POTASSIUM SERPL-SCNC: 4.6 MMOL/L — SIGNIFICANT CHANGE UP (ref 3.5–5.3)
PROCALCITONIN SERPL-MCNC: 0.2 NG/ML — HIGH (ref 0.02–0.1)
PROT UR-MCNC: SIGNIFICANT CHANGE UP
RBC # BLD: 3.79 M/UL — LOW (ref 3.8–5.2)
RBC # BLD: 4.17 M/UL — SIGNIFICANT CHANGE UP (ref 3.8–5.2)
RBC # FLD: 13.7 % — SIGNIFICANT CHANGE UP (ref 10.3–14.5)
RBC # FLD: 13.9 % — SIGNIFICANT CHANGE UP (ref 10.3–14.5)
RBC CASTS # UR COMP ASSIST: 1 /HPF — SIGNIFICANT CHANGE UP (ref 0–4)
SODIUM SERPL-SCNC: 134 MMOL/L — LOW (ref 135–145)
SP GR SPEC: 1.02 — SIGNIFICANT CHANGE UP (ref 1.01–1.02)
SPECIMEN SOURCE: SIGNIFICANT CHANGE UP
UROBILINOGEN FLD QL: NEGATIVE — SIGNIFICANT CHANGE UP
WBC # BLD: 14.33 K/UL — HIGH (ref 3.8–10.5)
WBC # BLD: 16.94 K/UL — HIGH (ref 3.8–10.5)
WBC # FLD AUTO: 14.33 K/UL — HIGH (ref 3.8–10.5)
WBC # FLD AUTO: 16.94 K/UL — HIGH (ref 3.8–10.5)
WBC UR QL: 1 /HPF — SIGNIFICANT CHANGE UP (ref 0–5)

## 2019-10-12 PROCEDURE — 99223 1ST HOSP IP/OBS HIGH 75: CPT

## 2019-10-12 RX ORDER — CLARITHROMYCIN 500 MG
500 TABLET ORAL
Refills: 0 | Status: DISCONTINUED | OUTPATIENT
Start: 2019-10-12 | End: 2019-10-13

## 2019-10-12 RX ORDER — METRONIDAZOLE 500 MG
500 TABLET ORAL
Refills: 0 | Status: DISCONTINUED | OUTPATIENT
Start: 2019-10-12 | End: 2019-10-13

## 2019-10-12 RX ORDER — ACETAMINOPHEN 500 MG
650 TABLET ORAL EVERY 4 HOURS
Refills: 0 | Status: DISCONTINUED | OUTPATIENT
Start: 2019-10-12 | End: 2019-11-04

## 2019-10-12 RX ORDER — ACETAMINOPHEN 500 MG
650 TABLET ORAL EVERY 6 HOURS
Refills: 0 | Status: DISCONTINUED | OUTPATIENT
Start: 2019-10-12 | End: 2019-10-12

## 2019-10-12 RX ORDER — SIMVASTATIN 20 MG/1
20 TABLET, FILM COATED ORAL AT BEDTIME
Refills: 0 | Status: DISCONTINUED | OUTPATIENT
Start: 2019-10-12 | End: 2019-10-12

## 2019-10-12 RX ORDER — METOPROLOL TARTRATE 50 MG
25 TABLET ORAL DAILY
Refills: 0 | Status: DISCONTINUED | OUTPATIENT
Start: 2019-10-12 | End: 2019-11-04

## 2019-10-12 RX ORDER — NORTRIPTYLINE HYDROCHLORIDE 10 MG/1
25 CAPSULE ORAL DAILY
Refills: 0 | Status: DISCONTINUED | OUTPATIENT
Start: 2019-10-12 | End: 2019-10-25

## 2019-10-12 RX ORDER — DIGOXIN 250 MCG
0.12 TABLET ORAL DAILY
Refills: 0 | Status: DISCONTINUED | OUTPATIENT
Start: 2019-10-12 | End: 2019-10-12

## 2019-10-12 RX ORDER — HYDROCHLOROTHIAZIDE 25 MG
12.5 TABLET ORAL DAILY
Refills: 0 | Status: DISCONTINUED | OUTPATIENT
Start: 2019-10-12 | End: 2019-10-15

## 2019-10-12 RX ORDER — ONDANSETRON 8 MG/1
4 TABLET, FILM COATED ORAL EVERY 6 HOURS
Refills: 0 | Status: DISCONTINUED | OUTPATIENT
Start: 2019-10-12 | End: 2019-11-04

## 2019-10-12 RX ORDER — ACETAMINOPHEN 500 MG
325 TABLET ORAL EVERY 6 HOURS
Refills: 0 | Status: DISCONTINUED | OUTPATIENT
Start: 2019-10-12 | End: 2019-10-12

## 2019-10-12 RX ORDER — ACETAMINOPHEN 500 MG
650 TABLET ORAL ONCE
Refills: 0 | Status: DISCONTINUED | OUTPATIENT
Start: 2019-10-12 | End: 2019-10-12

## 2019-10-12 RX ORDER — PANTOPRAZOLE SODIUM 20 MG/1
40 TABLET, DELAYED RELEASE ORAL
Refills: 0 | Status: DISCONTINUED | OUTPATIENT
Start: 2019-10-12 | End: 2019-11-04

## 2019-10-12 RX ORDER — AMLODIPINE BESYLATE 2.5 MG/1
2.5 TABLET ORAL DAILY
Refills: 0 | Status: DISCONTINUED | OUTPATIENT
Start: 2019-10-12 | End: 2019-10-19

## 2019-10-12 RX ORDER — SIMVASTATIN 20 MG/1
40 TABLET, FILM COATED ORAL AT BEDTIME
Refills: 0 | Status: DISCONTINUED | OUTPATIENT
Start: 2019-10-12 | End: 2019-10-12

## 2019-10-12 RX ORDER — ACETAMINOPHEN 500 MG
325 TABLET ORAL ONCE
Refills: 0 | Status: COMPLETED | OUTPATIENT
Start: 2019-10-12 | End: 2019-10-12

## 2019-10-12 RX ORDER — GABAPENTIN 400 MG/1
100 CAPSULE ORAL
Refills: 0 | Status: DISCONTINUED | OUTPATIENT
Start: 2019-10-12 | End: 2019-10-13

## 2019-10-12 RX ADMIN — Medication 325 MILLIGRAM(S): at 06:06

## 2019-10-12 RX ADMIN — Medication 650 MILLIGRAM(S): at 15:15

## 2019-10-12 RX ADMIN — Medication 650 MILLIGRAM(S): at 15:45

## 2019-10-12 RX ADMIN — GABAPENTIN 100 MILLIGRAM(S): 400 CAPSULE ORAL at 18:05

## 2019-10-12 RX ADMIN — Medication 25 MILLIGRAM(S): at 05:36

## 2019-10-12 RX ADMIN — AMLODIPINE BESYLATE 2.5 MILLIGRAM(S): 2.5 TABLET ORAL at 05:36

## 2019-10-12 RX ADMIN — Medication 12.5 MILLIGRAM(S): at 12:25

## 2019-10-12 RX ADMIN — Medication 325 MILLIGRAM(S): at 05:36

## 2019-10-12 RX ADMIN — PANTOPRAZOLE SODIUM 40 MILLIGRAM(S): 20 TABLET, DELAYED RELEASE ORAL at 18:05

## 2019-10-12 RX ADMIN — Medication 0.12 MILLIGRAM(S): at 05:36

## 2019-10-12 RX ADMIN — PANTOPRAZOLE SODIUM 40 MILLIGRAM(S): 20 TABLET, DELAYED RELEASE ORAL at 05:36

## 2019-10-12 NOTE — PATIENT PROFILE ADULT - NSPROEXTENSIONSOFSELF_GEN_A_NUR
cellphone, 3 rings, toiletry bag, white sweater, black & white with red pocketbook, elizabeth sneakers,  clothing4/cane/hearing aid

## 2019-10-12 NOTE — PROGRESS NOTE ADULT - ASSESSMENT
Surgical Pathology Report (09.18.19 @ 14:40)    Surgical Pathology Report:   ACCESSION No:  [id number]    PATO URIAH                      1        Surgical Final Report          Final Diagnosis  1. Antrum biopsy;  - Gastric antral type mucosa with chronic focally active  gastritis  - Helicobacter microorganisms are present ([place]arthin-Starry  stain)    Verified by: ERROL PIERSON MD  (Electronic Signature)  Reported on: 09/20/19 15:01 EDT, [address] [place] [address]  _________________________________________________________________    Clinical History  Anemia-erosive gastritis    Specimen(s) Submitted  1     Antrum biopsy    Gross Description  The specimen is received in formalin and the specimen container  is labeled: Antrum biopsy.  It consists of a three fragments of  tan soft tissue ranging from 0.2 cm to 0.3 cm in greatest  dimension. Special stains requested.  Entirely submitted.  One  cassette.    In addition to other data that may appear on the specimen  container, the label has been inspected to confirm the presence  of the patient's name and date of birth.    ADRIANNA Robbins ASCP 09/18/2019 20:02

## 2019-10-12 NOTE — CHART NOTE - NSCHARTNOTEFT_GEN_A_CORE
Patient with anemia, recent GI bleed. Received 1 prbc yesterday for 7.3  Another PRBC was ordered today by primary attending, which is discontinued  as recommended by primary attending since the  hgb currently 10.3.   Also ordered Clarithromycin (recent out patient endoscopy bx revealed H.pylori)   and gabapentin as recommended by Dr Martinez.  Amanda Brizuela NP  [phone] Patient with anemia, recent GI bleed. Received 1 prbc yesterday for 7.3  Another PRBC was ordered today by primary attending, which is discontinued  as recommended by primary attending since the  hgb currently 10.3.   Also ordered Clarithromycin (recent out patient endoscopy bx revealed H.pylori)   and gabapentin as recommended by Dr Martinez.  Amanda Brizuela NP  [phone]  NP addendum  DC simvastatin for now, as per pharmacy it cannot be given with clarithromycin.  Dr Martinez agreed with the plan  Amanda Brizuela NP

## 2019-10-12 NOTE — H&P ADULT - PROBLEM SELECTOR PLAN 1
Note hgb drop again. Pt endorses chronically dark stools after starting iron. Given recent admission, highly suspicious for source of anemia  -F/u post transfusion CBC  -GI consult in AM Note hgb drop again. Pt endorses chronically dark stools after starting iron. Given recent admission, highly suspicious for source of anemia  -F/u post transfusion CBC  -GI consult in AM (Brunner)

## 2019-10-12 NOTE — H&P ADULT - NSHPPHYSICALEXAM_GEN_ALL_CORE
Vital Signs Last 24 Hrs  T(C): 36.9 (10-12-19 @ 00:38), Max: 37.9 (10-11-19 @ 22:49)  T(F): 98.4 (10-12-19 @ 00:38), Max: 100.2 (10-11-19 @ 22:49)  HR: 89 (10-12-19 @ 00:38) (89 - 99)  BP: 151/70 (10-12-19 @ 00:38) (151/70 - 169/73)  BP(mean): --  RR: 18 (10-12-19 @ 00:38) (18 - 18)  SpO2: 93% (10-12-19 @ 00:38) (93% - 95%)

## 2019-10-12 NOTE — H&P ADULT - ASSESSMENT
89F w/ hx of DM2, Breast cancer, gastric ulcer, afib not on AC due to falls risk, HTN, HLD p/w worsening weakness and anemia 89F w/ hx of DM2, Breast cancer, gastric ulcer, afib not on AC due to falls risk, HTN, HLD p/w worsening weakness, anemia and borderline fevers

## 2019-10-12 NOTE — H&P ADULT - ATTENDING COMMENTS
I was asked to see this patient by the hospitalist in charge. Dr. Martinez to assume care for patient in AM and thereafter.

## 2019-10-12 NOTE — ED ADULT NURSE REASSESSMENT NOTE - NS ED NURSE REASSESS COMMENT FT1
0010 pt w/ bed pending transport, ordered prbc okd to transfuse on the floor per dr galdamez pt in no acute distress, asymptomatic vss /gc

## 2019-10-12 NOTE — H&P ADULT - PROBLEM SELECTOR PLAN 5
On metformin at home. Son would like to decline insulin for now  -Occasional fingersticks, can add sliding scale if needed

## 2019-10-12 NOTE — H&P ADULT - NSICDXPASTMEDICALHX_GEN_ALL_CORE_FT
PAST MEDICAL HISTORY:  Age-Related Hearing Loss wears hearing aids    Atrial Fibrillation     Bleeding Duodenal Ulcer resolved several years ago    CAD (coronary artery disease)     Fall, subsequent encounter 2 years    HTN (Hypertension)     Hypercholesteremia     Injury of head, subsequent encounter s/p fall 2 years ago   had bleed into area now ok.    Malignant neoplasm of left breast in female, estrogen receptor positive, unspecified site of breast ER/[place] positive  HER Negative (per family)    Osteopenia     Polymyalgia rheumatica     Unsteady gait

## 2019-10-12 NOTE — H&P ADULT - HISTORY OF PRESENT ILLNESS
89F w/ hx of DM2, Breast cancer, gastric ulcer, afib not on AC due to falls risk, HTN, HLD p/w worsening weakness and anemia. Pt was recently admitted for GIB roughly 3 weeks ago and upon endoscopy was found to have gastritis and duodenal ulcer. This was in setting of increased NSAID use to control pain from shingles exacerbation. Pt received transfusions and was discharged to follow up with GI regarding biopsy results. Son and pt informed of H. pyolri in biopsy. After lengthy family discussion, decision was made not to treat. Son noticed his mother getting weaker over past 1-2 weeks. Asked PMD to perform repeat blood test. Pt was found to have hgb of 7 and sent to ER for further evaluation. Pt denies any chest pain, SOB, palpitations. Endorses black mushy stool after starting iron in the hospital. Endorses waking up at night with sweats for many days. Denies any additional NSAID use.    In ER: Given acetaminophen

## 2019-10-12 NOTE — CHART NOTE - NSCHARTNOTEFT_GEN_A_CORE
Patient's dig level is 2.7 today. DC digoxin. Dr Martinez made aware.  Amanda Brizuela NP  [phone]

## 2019-10-12 NOTE — H&P ADULT - PROBLEM SELECTOR PLAN 2
T 100.2 in ER. Pt also with reported sweating at night and on around the clock tylenol for pain. CXR and UA unremarkable.  -F/u BCx  -F/u UCx  -Trend fever curve  -F/u Procalcitonin.

## 2019-10-12 NOTE — PROGRESS NOTE ADULT - SUBJECTIVE AND OBJECTIVE BOX
Subjective:    Exam:  Gen: AA NAD  Neck: no JVD  Chest: normal shape and expansion  Heart: RRR s M  Lungs: CTAB  Abdomen: Soft, NT, ND, no HSM, normoactive BS x 4  Ext: no CCE  Skin: no rash    Vital Signs Last 24 Hrs  T(C): 36.7 (12 Oct 2019 07:27), Max: 37.9 (11 Oct 2019 22:49)  T(F): 98.1 (12 Oct 2019 07:27), Max: 100.2 (11 Oct 2019 22:49)  HR: 88 (12 Oct 2019 07:27) (88 - 99)  BP: 174/80 (12 Oct 2019 07:27) (151/70 - 174/80)  BP(mean): --  RR: 18 (12 Oct 2019 07:27) (18 - 18)  SpO2: 95% (12 Oct 2019 07:27) (93% - 95%)    Daily Height in cm: 152.4 (11 Oct 2019 18:19)    Daily     I&O's Detail      Daily     CAPILLARY BLOOD GLUCOSE          MEDICATIONS  (STANDING):  amLODIPine   Tablet 2.5 milliGRAM(s) Oral daily  digoxin     Tablet 0.125 milliGRAM(s) Oral daily  hydrochlorothiazide 12.5 milliGRAM(s) Oral daily  metoprolol succinate ER 25 milliGRAM(s) Oral daily  nortriptyline 25 milliGRAM(s) Oral daily  pantoprazole    Tablet 40 milliGRAM(s) Oral two times a day  simvastatin 20 milliGRAM(s) Oral at bedtime    MEDICATIONS  (PRN):  acetaminophen  Suppository .. 325 milliGRAM(s) Rectal every 6 hours PRN Mild Pain (1 - 3)      Labs:                           7.7    14.57 )-----------( 652      ( 11 Oct 2019 21:49 )             25.2   baso 0.2    eos 0.5    imm gran 1.1    lymph 10.1   mono 10.2   poly 77.9       PT/INR - ( 11 Oct 2019 21:49 )   PT: 15.6 sec;   INR: 1.36 ratio         PTT - ( 11 Oct 2019 21:49 )  PTT:37.2 sec  10-12    134<L>  |  95<L>  |  17  ----------------------------<  124<H>  4.6   |  27  |  0.57    Ca    10.8<H>      12 Oct 2019 08:02  Mg     1.9     10-    TPro  7.2  /  Alb  2.8<L>  /  TBili  0.2  /  DBili  x   /  AST  26  /  ALT  35  /  AlkPhos  196<H>  10-        Urinalysis Basic - ( 11 Oct 2019 23:09 )    Color: Light Yellow / Appearance: Clear / S.020 / pH: x  Gluc: x / Ketone: Negative  / Bili: Negative / Urobili: Negative   Blood: x / Protein: Trace / Nitrite: Negative   Leuk Esterase: Negative / RBC: 1 /hpf / WBC 1 /HPF   Sq Epi: x / Non Sq Epi: 0 /hpf / Bacteria: Negative  < from: Upper Endoscopy (19 @ 14:10) >    [facility]  ____________________________________________________________________________________________________  Patient Name: Jesenia Gerard                    MRN: [id number]  Account Number: [id number]                     YOB: 1924  Room: Endoscopy Room 4                           Gender: Female  Attending MD: Robert Brunner , MD                Procedure Date No Time: 2019  ____________________________________________________________________________________________________     Procedure:           Upper GI endoscopy  Indications:         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  ____________________________________________________________________________________________________  Procedure:           Pre-Anesthesia Assessment:                       - Prior to the procedure, a History and Physical was performed, and patient                        medications and allergies were reviewed. The patient is competent. The risks                        and benefits of the procedure and the sedation options and risks were                        discussed with the patient. All questions were answered and informed consent                        was obtained. Patient identification and proposed procedure were verified by                        the physician in the pre-procedure area. Mental StatusExamination: alert and                        oriented. Airway Examination: normal oropharyngeal airway and neck mobility.                        Respiratory Examination: clear to auscultation. CV Examination: normal.                        Prophylactic Antibiotics: The patient does not require prophylactic                        antibiotics. Prior Anticoagulants: The patient has taken no previous                        anticoagulant or antiplatelet agents. ASA Grade Assessment: II - A patient                       with mild systemic disease. After reviewing the risks and benefits, the                        patient was deemed in satisfactory condition to undergo the procedure. The                        anesthesia plan was to use moderate sedation / analgesia (conscious                        sedation). Immediately prior to administration of medications, the patient                        was re-assessed for adequacy to receive sedatives. The heart rate,                        respiratory rate, oxygen saturations, blood pressure, adequacy of pulmonary                        ventilation, and response to care were monitored throughout the procedure.                        The physical status of the patient was re-assessed after the procedure.                       After obtaining informed consent, the endoscope was passed under direct                        vision. Throughout the procedure, the patient's blood pressure, pulse, and                        oxygen saturations were monitored continuously. The Endoscope was introduced                        through the mouth, and advanced to the second part of duodenum. The upper GI                        endoscopy was accomplished without difficulty. The patient tolerated the                    procedure well.                                                                                                        Findings:       A medium-sized hiatus hernia was present.       Patchy severe inflammation characterized by erosions and shallow ulcerations was found in the        gastric antrum. No active or pending bleeding noted. Biopsies were taken with a cold forceps        for Helicobacter pylori testing. Estimated blood loss: none.       Multiple diffuse superficial erosions without bleeding were found in the duodenal bulb.                                                                                                        Impression:          - Medium-sized hiatus hernia.                       - Chronic Erosive Non Bleeding gastritis. Biopsied. Paul NSAID induced                       - Duodenal erosions without bleeding.  Recommendation:      - Advance diet as tolerated.                       - Avoid NSAIDAS                       - Recommend acid suppression medication.                                                                                                        Attending Participation:       I personally performed the entire procedure.                                                __________________  Robert Brunner, MD  2019 2:46:35 PM  This report has been signed electronically.  Number of Addenda: 0    Note Initiated On: 2019 2:10 PM    < end of copied text >        < from: Colonoscopy (12 @ 11:29) >  [facility]  Endoscopy Report  ____________________________________________________________________________________________________  Patient Name: Jesenia Gerard                    MRN: [id number]  Account Number: [id number]                 YOB: 1924  Room: Endoscopy Room 4                           Gender: Female  Attending MD: Janie Flores MD           Procedure Date No Time: 2012  ____________________________________________________________________________________________________     Procedure:           Colonoscopy  Indications:         Iron deficiency anemia  Providers:           Janie Flores MD, Nicanor Martinez MD (Fellow)  Medicines:           Monitored Anesthesia Care  Complications:       No immediate complications.  ____________________________________________________________________________________________________  Procedure:           After I obtained informed consent, the scope was passed under direct vision.                      Throughout the procedure, the patient's blood pressure, pulse, and oxygen                        saturations were monitored continuously. The Colonoscope was introduced                        through the anus and advanced to the cecum, identified by appendiceal orifice                        & ileocecal valve. The colonoscopy was performed without difficulty. The                        patient tolerated the procedure well. The quality of the bowel preparation       was good.                                                                                                        Findings:       The colon (entire examined portion) appeared normal. The retroflexed view of the distal        rectum and anal verge was normal and showed no anal or rectal abnormalities. Estimated blood        loss: none.                                                                                                        Impression:          - The entire examined colon is normal.                       - The distal rectum and anal verge are normal on retroflexion view.                       - No overt source of the patients iron deficiency anemia was appreciated.  Recommendation:      - [facility].                       - Risks versus benefits of Aspirin and Plavix must be discussed with                        cardiology.                                                                                                          ________________________  Janie Flores MD  2012 4:34 PM    < end of copied text > HPI pt is 89 yo female recent admission for severe anemia for GIB due to gastritis fromnsaid found on endoscopy . pt was using excessive nsaid for pain from shingles post herpetic neuralgia. since dc from hospital started mobic by family member was told to stop and eventually did not compliant with fe supplement gradually weaker seen in office yesterday and feeling weaker and hgb 7.2 sent to hospital for blood tx and further kaur    PAST MEDICAL & SURGICAL HISTORY:  Unsteady gait  Osteopenia  Injury of head, subsequent encounter: s/p fall 2 years ago   had bleed into area now ok.  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, estrogen receptor positive, unspecified site of breast: ER/[place] positive  HER Negative (per family)  Polymyalgia rheumatica  CAD (coronary artery disease)  Bleeding Duodenal Ulcer: resolved several years ago  Age-Related Hearing Loss: wears hearing aids  Hypercholesteremia  HTN (Hypertension)  Atrial Fibrillation  Closed fracture of patella: left   surgery with pins/screws  4- 5 years ago  S/P colonoscopy: 8 years ago negative  S/P breast biopsy, left  Atherosclerotic Coronary Vascular Disease: coronary stent placement in   Stented Coronary Artery: PCI X 1    Allergies    penicillin (Unknown)    Intolerances      sh; no etoh no drugs no tobacco    Vital Signs Last 24 Hrs  T(C): 36.7 (12 Oct 2019 07:27), Max: 37.9 (11 Oct 2019 22:49)  T(F): 98.1 (12 Oct 2019 07:27), Max: 100.2 (11 Oct 2019 22:49)  HR: 88 (12 Oct 2019 07:27) (88 - 99)  BP: 174/80 (12 Oct 2019 07:27) (151/70 - 174/80)  BP(mean): --  RR: 18 (12 Oct 2019 07:27) (18 - 18)  SpO2: 95% (12 Oct 2019 07:27) (93% - 95%)    Daily Height in cm: 152.4 (11 Oct 2019 18:19)    Daily     I&O's Detail      Daily     CAPILLARY BLOOD GLUCOSE          MEDICATIONS  (STANDING):  amLODIPine   Tablet 2.5 milliGRAM(s) Oral daily  digoxin     Tablet 0.125 milliGRAM(s) Oral daily  hydrochlorothiazide 12.5 milliGRAM(s) Oral daily  metoprolol succinate ER 25 milliGRAM(s) Oral daily  nortriptyline 25 milliGRAM(s) Oral daily  pantoprazole    Tablet 40 milliGRAM(s) Oral two times a day  simvastatin 20 milliGRAM(s) Oral at bedtime    MEDICATIONS  (PRN):  acetaminophen  Suppository .. 325 milliGRAM(s) Rectal every 6 hours PRN Mild Pain (1 - 3)      Labs:                           7.7    14.57 )-----------( 652      ( 11 Oct 2019 21:49 )             25.2   baso 0.2    eos 0.5    imm gran 1.1    lymph 10.1   mono 10.2   poly 77.9       PT/INR - ( 11 Oct 2019 21:49 )   PT: 15.6 sec;   INR: 1.36 ratio         PTT - ( 11 Oct 2019 21:49 )  PTT:37.2 sec  10-12    134<L>  |  95<L>  |  17  ----------------------------<  124<H>  4.6   |  27  |  0.57    Ca    10.8<H>      12 Oct 2019 08:02  Mg     1.9     10-12    TPro  7.2  /  Alb  2.8<L>  /  TBili  0.2  /  DBili  x   /  AST  26  /  ALT  35  /  AlkPhos  196<H>  10-11        Urinalysis Basic - ( 11 Oct 2019 23:09 )    Color: Light Yellow / Appearance: Clear / S.020 / pH: x  Gluc: x / Ketone: Negative  / Bili: Negative / Urobili: Negative   Blood: x / Protein: Trace / Nitrite: Negative   Leuk Esterase: Negative / RBC: 1 /hpf / WBC 1 /HPF   Sq Epi: x / Non Sq Epi: 0 /hpf / Bacteria: Negative  < from: Upper Endoscopy (19 @ 14:10) >    [facility]  ____________________________________________________________________________________________________  Patient Name: Jesenia Gerard                    MRN: [id number]  Account Number: [id number]                     YOB: 1924  Room: Endoscopy Room 4                           Gender: Female  Attending MD: Robert Brunner , MD                Procedure Date No Time: 2019  ____________________________________________________________________________________________________     Procedure:           Upper GI endoscopy  Indications:         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  ____________________________________________________________________________________________________  Procedure:           Pre-Anesthesia Assessment:                       - Prior to the procedure, a History and Physical was performed, and patient                        medications and allergies were reviewed. The patient is competent. The risks                        and benefits of the procedure and the sedation options and risks were                        discussed with the patient. All questions were answered and informed consent                        was obtained. Patient identification and proposed procedure were verified by                        the physician in the pre-procedure area. Mental StatusExamination: alert and                        oriented. Airway Examination: normal oropharyngeal airway and neck mobility.                        Respiratory Examination: clear to auscultation. CV Examination: normal.                        Prophylactic Antibiotics: The patient does not require prophylactic                        antibiotics. Prior Anticoagulants: The patient has taken no previous                        anticoagulant or antiplatelet agents. ASA Grade Assessment: II - A patient                       with mild systemic disease. After reviewing the risks and benefits, the                        patient was deemed in satisfactory condition to undergo the procedure. The                        anesthesia plan was to use moderate sedation / analgesia (conscious                        sedation). Immediately prior to administration of medications, the patient                        was re-assessed for adequacy to receive sedatives. The heart rate,                        respiratory rate, oxygen saturations, blood pressure, adequacy of pulmonary                        ventilation, and response to care were monitored throughout the procedure.                        The physical status of the patient was re-assessed after the procedure.                       After obtaining informed consent, the endoscope was passed under direct                        vision. Throughout the procedure, the patient's blood pressure, pulse, and                        oxygen saturations were monitored continuously. The Endoscope was introduced                        through the mouth, and advanced to the second part of duodenum. The upper GI                        endoscopy was accomplished without difficulty. The patient tolerated the                    procedure well.                                                                                                        Findings:       A medium-sized hiatus hernia was present.       Patchy severe inflammation characterized by erosions and shallow ulcerations was found in the        gastric antrum. No active or pending bleeding noted. Biopsies were taken with a cold forceps        for Helicobacter pylori testing. Estimated blood loss: none.       Multiple diffuse superficial erosions without bleeding were found in the duodenal bulb.                                                                                                        Impression:          - Medium-sized hiatus hernia.                       - Chronic Erosive Non Bleeding gastritis. Biopsied. Likley NSAID induced                       - Duodenal erosions without bleeding.  Recommendation:      - Advance diet as tolerated.                       - Avoid NSAIDAS                       - Recommend acid suppression medication.                                                                                                        Attending Participation:       I personally performed the entire procedure.                                                __________________  Robert Brunner, MD  2019 2:46:35 PM  This report has been signed electronically.  Number of Addenda: 0    Note Initiated On: 2019 2:10 PM    < end of copied text >        < from: Colonoscopy (12 @ 11:29) >  [facility]  Endoscopy Report  ____________________________________________________________________________________________________  Patient Name: Jesenia Gerard                    MRN: [id number]  Account Number: [id number]                 YOB: 1924  Room: Endoscopy Room 4                           Gender: Female  Attending MD: Janie Flores MD           Procedure Date No Time: 2012  ____________________________________________________________________________________________________     Procedure:           Colonoscopy  Indications:         Iron deficiency anemia  Providers:           Janie Flores MD, Nicanor Martinez MD (Fellow)  Medicines:           Monitored Anesthesia Care  Complications:       No immediate complications.  ____________________________________________________________________________________________________  Procedure:           After I obtained informed consent, the scope was passed under direct vision.                      Throughout the procedure, the patient's blood pressure, pulse, and oxygen                        saturations were monitored continuously. The Colonoscope was introduced                        through the anus and advanced to the cecum, identified by appendiceal orifice                        & ileocecal valve. The colonoscopy was performed without difficulty. The                        patient tolerated the procedure well. The quality of the bowel preparation       was good.                                                                                                        Findings:       The colon (entire examined portion) appeared normal. The retroflexed view of the distal        rectum and anal verge was normal and showed no anal or rectal abnormalities. Estimated blood        loss: none.                                                                                                        Impression:          - The entire examined colon is normal.                       - The distal rectum and anal verge are normal on retroflexion view.                       - No overt source of the patients iron deficiency anemia was appreciated.  Recommendation:      - Return patient to [facility] for [facility] care.                       - Risks versus benefits of Aspirin and Plavix must be discussed with                        cardiology.                                                                                                          ________________________  Janie Flores MD  2012 4:34 PM    < end of copied text >

## 2019-10-12 NOTE — H&P ADULT - PROBLEM SELECTOR PLAN 7
-Trend vital signs  -Will cont. HCTZ with hold parameters  -Will cont. amlodipine with hold parameters

## 2019-10-12 NOTE — H&P ADULT - PROBLEM SELECTOR PLAN 3
Pt with H. pylori diagnosed on recent ulcer. Pt with H. pylori diagnosed on recent ulcer. reportedly decision was made not to Rx? Also note reported penicillin allergy, pt does not know what reaction is.  -F/u GI recommendations regarding Rx of H. Pylori. Pt and son would prefer to follow Rx plan of specialist.  -Consider ID?

## 2019-10-12 NOTE — H&P ADULT - PROBLEM SELECTOR PLAN 6
Not on AC due to falls, would not start with possible GIB as well.  -Cont. digoxin for now  -F/u Digoxin lvl  -cont. Toprol with hold parameters

## 2019-10-12 NOTE — H&P ADULT - NSHPSOCIALHISTORY_GEN_ALL_CORE
Denies smoking or ETOH. Usually lives by self but son moved in to help recently. Has needed wheelchair with current weakness

## 2019-10-13 DIAGNOSIS — N30.00 ACUTE CYSTITIS WITHOUT HEMATURIA: ICD-10-CM

## 2019-10-13 LAB
ALBUMIN SERPL ELPH-MCNC: 2.6 G/DL — LOW (ref 3.3–5)
ALP SERPL-CCNC: 172 U/L — HIGH (ref 40–120)
ALT FLD-CCNC: 23 U/L — SIGNIFICANT CHANGE UP (ref 10–45)
ANION GAP SERPL CALC-SCNC: 13 MMOL/L — SIGNIFICANT CHANGE UP (ref 5–17)
APPEARANCE UR: ABNORMAL
APPEARANCE UR: ABNORMAL
AST SERPL-CCNC: 21 U/L — SIGNIFICANT CHANGE UP (ref 10–40)
BACTERIA # UR AUTO: NEGATIVE — SIGNIFICANT CHANGE UP
BILIRUB SERPL-MCNC: 0.5 MG/DL — SIGNIFICANT CHANGE UP (ref 0.2–1.2)
BILIRUB UR-MCNC: NEGATIVE — SIGNIFICANT CHANGE UP
BILIRUB UR-MCNC: NEGATIVE — SIGNIFICANT CHANGE UP
BUN SERPL-MCNC: 31 MG/DL — HIGH (ref 7–23)
CALCIUM SERPL-MCNC: 10.8 MG/DL — HIGH (ref 8.4–10.5)
CHLORIDE SERPL-SCNC: 90 MMOL/L — LOW (ref 96–108)
CO2 SERPL-SCNC: 25 MMOL/L — SIGNIFICANT CHANGE UP (ref 22–31)
COD CRY URNS QL: ABNORMAL
COLOR SPEC: YELLOW — SIGNIFICANT CHANGE UP
COLOR SPEC: YELLOW — SIGNIFICANT CHANGE UP
COMMENT - URINE: SIGNIFICANT CHANGE UP
CREAT SERPL-MCNC: 0.83 MG/DL — SIGNIFICANT CHANGE UP (ref 0.5–1.3)
DIFF PNL FLD: ABNORMAL
DIFF PNL FLD: SIGNIFICANT CHANGE UP
DIGOXIN SERPL-MCNC: 1.2 NG/ML — SIGNIFICANT CHANGE UP (ref 0.8–2)
EPI CELLS # UR: 5 /HPF — SIGNIFICANT CHANGE UP (ref 0–5)
GLUCOSE BLDC GLUCOMTR-MCNC: 142 MG/DL — HIGH (ref 70–99)
GLUCOSE BLDC GLUCOMTR-MCNC: 165 MG/DL — HIGH (ref 70–99)
GLUCOSE BLDC GLUCOMTR-MCNC: 187 MG/DL — HIGH (ref 70–99)
GLUCOSE BLDC GLUCOMTR-MCNC: 218 MG/DL — HIGH (ref 70–99)
GLUCOSE SERPL-MCNC: 209 MG/DL — HIGH (ref 70–99)
GLUCOSE UR QL: NEGATIVE — SIGNIFICANT CHANGE UP
GLUCOSE UR QL: SIGNIFICANT CHANGE UP
HCT VFR BLD CALC: 31.1 % — LOW (ref 34.5–45)
HGB BLD-MCNC: 10.3 G/DL — LOW (ref 11.5–15.5)
HYALINE CASTS # UR AUTO: 2 /LPF — SIGNIFICANT CHANGE UP (ref 0–7)
KETONES UR-MCNC: ABNORMAL
KETONES UR-MCNC: NEGATIVE — SIGNIFICANT CHANGE UP
LEUKOCYTE ESTERASE UR-ACNC: ABNORMAL
LEUKOCYTE ESTERASE UR-ACNC: NEGATIVE — SIGNIFICANT CHANGE UP
MCHC RBC-ENTMCNC: 27.4 PG — SIGNIFICANT CHANGE UP (ref 27–34)
MCHC RBC-ENTMCNC: 33.1 GM/DL — SIGNIFICANT CHANGE UP (ref 32–36)
MCV RBC AUTO: 82.7 FL — SIGNIFICANT CHANGE UP (ref 80–100)
NITRITE UR-MCNC: POSITIVE
NITRITE UR-MCNC: POSITIVE
NRBC # BLD: 0 /100 WBCS — SIGNIFICANT CHANGE UP (ref 0–0)
PH UR: 6 — SIGNIFICANT CHANGE UP (ref 5–8)
PH UR: 6.5 — SIGNIFICANT CHANGE UP (ref 5–8)
PLATELET # BLD AUTO: 659 K/UL — HIGH (ref 150–400)
POTASSIUM SERPL-MCNC: 4.1 MMOL/L — SIGNIFICANT CHANGE UP (ref 3.5–5.3)
POTASSIUM SERPL-SCNC: 4.1 MMOL/L — SIGNIFICANT CHANGE UP (ref 3.5–5.3)
PROT SERPL-MCNC: 7.1 G/DL — SIGNIFICANT CHANGE UP (ref 6–8.3)
PROT UR-MCNC: ABNORMAL
PROT UR-MCNC: ABNORMAL
RBC # BLD: 3.76 M/UL — LOW (ref 3.8–5.2)
RBC # FLD: 13.9 % — SIGNIFICANT CHANGE UP (ref 10.3–14.5)
RBC CASTS # UR COMP ASSIST: 2 /HPF — SIGNIFICANT CHANGE UP (ref 0–4)
SODIUM SERPL-SCNC: 128 MMOL/L — LOW (ref 135–145)
SP GR SPEC: 1.04 — HIGH (ref 1.01–1.02)
SP GR SPEC: >=1.03 (ref 1.01–1.02)
UROBILINOGEN FLD QL: SIGNIFICANT CHANGE UP
UROBILINOGEN FLD QL: SIGNIFICANT CHANGE UP
WBC # BLD: 20.06 K/UL — HIGH (ref 3.8–10.5)
WBC # FLD AUTO: 20.06 K/UL — HIGH (ref 3.8–10.5)
WBC UR QL: 1 /HPF — SIGNIFICANT CHANGE UP (ref 0–5)

## 2019-10-13 RX ORDER — SODIUM CHLORIDE 9 MG/ML
500 INJECTION INTRAMUSCULAR; INTRAVENOUS; SUBCUTANEOUS
Refills: 0 | Status: DISCONTINUED | OUTPATIENT
Start: 2019-10-13 | End: 2019-10-13

## 2019-10-13 RX ORDER — SODIUM CHLORIDE 9 MG/ML
500 INJECTION INTRAMUSCULAR; INTRAVENOUS; SUBCUTANEOUS ONCE
Refills: 0 | Status: COMPLETED | OUTPATIENT
Start: 2019-10-13 | End: 2019-10-14

## 2019-10-13 RX ORDER — SODIUM CHLORIDE 9 MG/ML
1000 INJECTION INTRAMUSCULAR; INTRAVENOUS; SUBCUTANEOUS
Refills: 0 | Status: DISCONTINUED | OUTPATIENT
Start: 2019-10-13 | End: 2019-10-14

## 2019-10-13 RX ORDER — SODIUM CHLORIDE 9 MG/ML
1000 INJECTION INTRAMUSCULAR; INTRAVENOUS; SUBCUTANEOUS
Refills: 0 | Status: DISCONTINUED | OUTPATIENT
Start: 2019-10-13 | End: 2019-10-18

## 2019-10-13 RX ADMIN — GABAPENTIN 100 MILLIGRAM(S): 400 CAPSULE ORAL at 06:38

## 2019-10-13 RX ADMIN — Medication 650 MILLIGRAM(S): at 22:07

## 2019-10-13 RX ADMIN — AMLODIPINE BESYLATE 2.5 MILLIGRAM(S): 2.5 TABLET ORAL at 06:38

## 2019-10-13 RX ADMIN — NORTRIPTYLINE HYDROCHLORIDE 25 MILLIGRAM(S): 10 CAPSULE ORAL at 21:34

## 2019-10-13 RX ADMIN — Medication 25 MILLIGRAM(S): at 06:38

## 2019-10-13 RX ADMIN — Medication 650 MILLIGRAM(S): at 21:37

## 2019-10-13 RX ADMIN — Medication 12.5 MILLIGRAM(S): at 06:38

## 2019-10-13 RX ADMIN — PANTOPRAZOLE SODIUM 40 MILLIGRAM(S): 20 TABLET, DELAYED RELEASE ORAL at 06:38

## 2019-10-13 RX ADMIN — SODIUM CHLORIDE 75 MILLILITER(S): 9 INJECTION INTRAMUSCULAR; INTRAVENOUS; SUBCUTANEOUS at 01:06

## 2019-10-13 NOTE — CHART NOTE - NSCHARTNOTEFT_GEN_A_CORE
URIAH WHYTE    Notified by RN patient with N/V after dinner and poor po intake. Seen pt at bedside in NAD. Oriented to name only, and poor skin turgor.        Interventions taken   NS 500cc iv at 75cc/hr for hydration.  cont assess and monitor.  f/u with am team.                      Jayesh Porras ANP-BC  Spectralink #[id number]

## 2019-10-13 NOTE — PROGRESS NOTE ADULT - ASSESSMENT
89 female admitted with anemia and guaiac positive stool in the setting of a recent UGI bleed. GI to see her. Also has elevated WBC with normal CXR and UA.    Patient more confused over the past 24 hours. Her son, who was at the bedside during the exam, and is her closest living relative, asking to have her gabapentin and antibiotics stopped immediately.  Discussed with her son the rising WBC could be from infection and her abdominal tenderness might indicate an abdominal source.  Her son is refusing to allow an abdominal Xray, citing concerns about ionizing radiation. When explained this is not a risk in a person her age, he stated he still did not want her to have an xray. He is aware this could delay making a diagnosis and taking appropriate interventions. 89 female admitted with anemia and guaiac positive stool in the setting of a recent UGI bleed. GI to see her. Also has elevated WBC.    Patient more confused over the past 24 hours. Her son, who was at the bedside during the exam, and is her closest living relative, asking to have her gabapentin and antibiotics stopped immediately.  Discussed with her son the rising WBC could be from infection and her abdominal tenderness might indicate an abdominal source.  Her son is refusing to allow an abdominal Xray, citing concerns about ionizing radiation. When explained this is not a risk in a person her age, he stated he still did not want her to have an xray. He is aware this could delay making a diagnosis and taking appropriate interventions.  Repeat UA reveals a UTI.

## 2019-10-13 NOTE — PROGRESS NOTE ADULT - SUBJECTIVE AND OBJECTIVE BOX
HPI pt is 89 yo female recent admission for severe anemia for GIB due to gastritis fromnsaid found on endoscopy . pt was using excessive nsaid for pain from shingles post herpetic neuralgia. since dc from hospital started mobic by family member was told to stop and eventually did not compliant with fe supplement gradually weaker seen in office yesterday and feeling weaker and hgb 7.2 sent to hospital for blood tx and further kaur    PAST MEDICAL & SURGICAL HISTORY:  Unsteady gait  Osteopenia  Injury of head, subsequent encounter: s/p fall 2 years ago   had bleed into area now ok.  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, estrogen receptor positive, unspecified site of breast: ER/FL positive  HER Negative (per family)  Polymyalgia rheumatica  CAD (coronary artery disease)  Bleeding Duodenal Ulcer: resolved several years ago  Age-Related Hearing Loss: wears hearing aids  Hypercholesteremia  HTN (Hypertension)  Atrial Fibrillation  Closed fracture of patella: left   surgery with pins/screws  4- 5 years ago  S/P colonoscopy: 8 years ago negative  S/P breast biopsy, left  Atherosclerotic Coronary Vascular Disease: coronary stent placement in   Stented Coronary Artery: PCI X 1    Allergies    penicillin (Unknown)    Intolerances      sh; no etoh no drugs no tobacco    ICU Vital Signs Last 24 Hrs  T(C): 37.1 (13 Oct 2019 07:35), Max: 37.3 (12 Oct 2019 14:56)  T(F): 98.7 (13 Oct 2019 07:35), Max: 99.2 (12 Oct 2019 14:56)  HR: 98 (13 Oct 2019 07:35) (91 - 100)  BP: 157/78 (13 Oct 2019 07:35) (157/69 - 174/81)  BP(mean): --  ABP: --  ABP(mean): --  RR: 18 (13 Oct 2019 07:35) (18 - 18)  SpO2: 95% (13 Oct 2019 07:35) (94% - 95%)        MEDICATIONS  (STANDING):  amLODIPine   Tablet 2.5 milliGRAM(s) Oral daily  clarithromycin 500 milliGRAM(s) Oral two times a day  hydrochlorothiazide 12.5 milliGRAM(s) Oral daily  metoprolol succinate ER 25 milliGRAM(s) Oral daily  metroNIDAZOLE    Tablet 500 milliGRAM(s) Oral two times a day  nortriptyline 25 milliGRAM(s) Oral daily  pantoprazole    Tablet 40 milliGRAM(s) Oral two times a day  sodium chloride 0.9%. 500 milliLiter(s) (75 mL/Hr) IV Continuous <Continuous>    MEDICATIONS  (PRN):  acetaminophen    Suspension .. 650 milliGRAM(s) Oral every 4 hours PRN Temp greater or equal to 38C (100.4F), Moderate Pain (4 - 6)  ondansetron Injectable 4 milliGRAM(s) IV Push every 6 hours PRN Nausea and/or Vomiting        PHYSICAL EXAM:  GENERAL: NAD, well-groomed, well-developed  HEAD:  Atraumatic, Normocephalic  EYES: EOMI, PERRLA, conjunctiva and sclera clear  ENMT: No tonsillar erythema, exudates, or enlargement; Moist mucous membranes, Good dentition, No lesions  NECK: Supple, No JVD, Normal thyroid  NERVOUS SYSTEM:  Alert & Oriented X3, Good concentration; Motor Strength 5/5 B/L upper and lower extremities; DTRs 2+ intact and symmetric  CHEST/LUNG: Clear to percussion bilaterally; No rales, rhonchi, wheezing, or rubs  HEART: Regular rate and rhythm; No murmurs, rubs, or gallops  ABDOMEN: Soft, mild mid abdominal tenderness, Nondistended; Bowel sounds present  EXTREMITIES:  2+ Peripheral Pulses, No clubbing, cyanosis, or edema  LYMPH: No lymphadenopathy noted  SKIN: No rashes or lesions    Consultant(s) Notes Reviewed:  [x ] YES  [ ] NO  Care Discussed with Consultants/Other Providers [ x] YES  [ ] NO    LABS:                        10.3   20.06 )-----------( 659      ( 13 Oct 2019 06:40 )             31.1     10-    128<L>  |  90<L>  |  31<H>  ----------------------------<  209<H>  4.1   |  25  |  0.83    Ca    10.8<H>      13 Oct 2019 11:54  Mg     1.9     10-12    TPro  7.1  /  Alb  2.6<L>  /  TBili  0.5  /  DBili  x   /  AST  21  /  ALT  23  /  AlkPhos  172<H>  10-13    PT/INR - ( 11 Oct 2019 21:49 )   PT: 15.6 sec;   INR: 1.36 ratio         PTT - ( 11 Oct 2019 21:49 )  PTT:37.2 sec  Urinalysis Basic - ( 13 Oct 2019 13:48 )    Color: Yellow / Appearance: Turbid / S.042 / pH: x  Gluc: x / Ketone: Small  / Bili: Negative / Urobili: <2 mg/dL   Blood: x / Protein: 100 mg/dL / Nitrite: Positive   Leuk Esterase: Moderate / RBC: 20 /HPF / WBC 58 /HPF   Sq Epi: x / Non Sq Epi: 21 /HPF / Bacteria: TNTC      CAPILLARY BLOOD GLUCOSE  POCT Blood Glucose.: 218 mg/dL (13 Oct 2019 13:38)  POCT Blood Glucose.: 187 mg/dL (13 Oct 2019 10:03)  POCT Blood Glucose.: 187 mg/dL (12 Oct 2019 21:18)  POCT Blood Glucose.: 138 mg/dL (12 Oct 2019 18:18)            < from: Upper Endoscopy (19 @ 14:10) >    [facility]  ____________________________________________________________________________________________________  Patient Name: Jesenia Gerard                    MRN: [id number]  Account Number: [id number]                     YOB: 1924  Room: Endoscopy Room 4                           Gender: Female  Attending MD: Robert Brunner , MD                Procedure Date No Time: 2019  ____________________________________________________________________________________________________     Procedure:           Upper GI endoscopy  Indications:         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  ____________________________________________________________________________________________________  Procedure:           Pre-Anesthesia Assessment:                       - Prior to the procedure, a History and Physical was performed, and patient                        medications and allergies were reviewed. The patient is competent. The risks                        and benefits of the procedure and the sedation options and risks were                        discussed with the patient. All questions were answered and informed consent                        was obtained. Patient identification and proposed procedure were verified by                        the physician in the pre-procedure area. Mental StatusExamination: alert and                        oriented. Airway Examination: normal oropharyngeal airway and neck mobility.                        Respiratory Examination: clear to auscultation. CV Examination: normal.                        Prophylactic Antibiotics: The patient does not require prophylactic                        antibiotics. Prior Anticoagulants: The patient has taken no previous                        anticoagulant or antiplatelet agents. ASA Grade Assessment: II - A patient                       with mild systemic disease. After reviewing the risks and benefits, the                        patient was deemed in satisfactory condition to undergo the procedure. The                        anesthesia plan was to use moderate sedation / analgesia (conscious                        sedation). Immediately prior to administration of medications, the patient                        was re-assessed for adequacy to receive sedatives. The heart rate,                        respiratory rate, oxygen saturations, blood pressure, adequacy of pulmonary                        ventilation, and response to care were monitored throughout the procedure.                        The physical status of the patient was re-assessed after the procedure.                       After obtaining informed consent, the endoscope was passed under direct                        vision. Throughout the procedure, the patient's blood pressure, pulse, and                        oxygen saturations were monitored continuously. The Endoscope was introduced                        through the mouth, and advanced to the second part of duodenum. The upper GI                        endoscopy was accomplished without difficulty. The patient tolerated the                    procedure well.                                                                                                        Findings:       A medium-sized hiatus hernia was present.       Patchy severe inflammation characterized by erosions and shallow ulcerations was found in the        gastric antrum. No active or pending bleeding noted. Biopsies were taken with a cold forceps        for Helicobacter pylori testing. Estimated blood loss: none.       Multiple diffuse superficial erosions without bleeding were found in the duodenal bulb.                                                                                                        Impression:          - Medium-sized hiatus hernia.                       - Chronic Erosive Non Bleeding gastritis. Biopsied. Likley NSAID induced                       - Duodenal erosions without bleeding.  Recommendation:      - Advance diet as tolerated.                       - Avoid NSAIDAS                       - Recommend acid suppression medication.                                                                                                        Attending Participation:       I personally performed the entire procedure.                                                __________________  Robert Brunner, MD  2019 2:46:35 PM  This report has been signed electronically.  Number of Addenda: 0    Note Initiated On: 2019 2:10 PM    < end of copied text >        < from: Colonoscopy (12 @ 11:29) >  [facility]  Endoscopy Report  ____________________________________________________________________________________________________  Patient Name: Jesenia Gerard                    MRN: [id number]  Account Number: [id number]                 YOB: 1924  Room: Endoscopy Room 4                           Gender: Female  Attending MD: Janie Flores MD           Procedure Date No Time: 2012  ____________________________________________________________________________________________________     Procedure:           Colonoscopy  Indications:         Iron deficiency anemia  Providers:           Janie Flores MD, Nicanor Martinez MD (Fellow)  Medicines:           Monitored Anesthesia Care  Complications:       No immediate complications.  ____________________________________________________________________________________________________  Procedure:           After I obtained informed consent, the scope was passed under direct vision.                      Throughout the procedure, the patient's blood pressure, pulse, and oxygen                        saturations were monitored continuously. The Colonoscope was introduced                        through the anus and advanced to the cecum, identified by appendiceal orifice                        & ileocecal valve. The colonoscopy was performed without difficulty. The                        patient tolerated the procedure well. The quality of the bowel preparation       was good.                                                                                                        Findings:       The colon (entire examined portion) appeared normal. The retroflexed view of the distal        rectum and anal verge was normal and showed no anal or rectal abnormalities. Estimated blood        loss: none.                                                                                                        Impression:          - The entire examined colon is normal.                       - The distal rectum and anal verge are normal on retroflexion view.                       - No overt source of the patients iron deficiency anemia was appreciated.  Recommendation:      - Return patient to [facility] for [facility] care.                       - Risks versus benefits of Aspirin and Plavix must be discussed with                        cardiology.                                                                                                          ________________________  Janie Flores MD  2012 4:34 PM    < end of copied text >

## 2019-10-14 DIAGNOSIS — R41.82 ALTERED MENTAL STATUS, UNSPECIFIED: ICD-10-CM

## 2019-10-14 DIAGNOSIS — E87.1 HYPO-OSMOLALITY AND HYPONATREMIA: ICD-10-CM

## 2019-10-14 DIAGNOSIS — A41.9 SEPSIS, UNSPECIFIED ORGANISM: ICD-10-CM

## 2019-10-14 DIAGNOSIS — K92.2 GASTROINTESTINAL HEMORRHAGE, UNSPECIFIED: ICD-10-CM

## 2019-10-14 LAB
ALBUMIN SERPL ELPH-MCNC: 2.7 G/DL — LOW (ref 3.3–5)
ALP SERPL-CCNC: 172 U/L — HIGH (ref 40–120)
ALT FLD-CCNC: 43 U/L — SIGNIFICANT CHANGE UP (ref 10–45)
ANION GAP SERPL CALC-SCNC: 13 MMOL/L — SIGNIFICANT CHANGE UP (ref 5–17)
ANION GAP SERPL CALC-SCNC: 15 MMOL/L — SIGNIFICANT CHANGE UP (ref 5–17)
AST SERPL-CCNC: 47 U/L — HIGH (ref 10–40)
BASE EXCESS BLDV CALC-SCNC: 1.7 MMOL/L — SIGNIFICANT CHANGE UP (ref -2–2)
BASOPHILS # BLD AUTO: 0.02 K/UL — SIGNIFICANT CHANGE UP (ref 0–0.2)
BASOPHILS NFR BLD AUTO: 0.1 % — SIGNIFICANT CHANGE UP (ref 0–2)
BILIRUB SERPL-MCNC: 0.4 MG/DL — SIGNIFICANT CHANGE UP (ref 0.2–1.2)
BUN SERPL-MCNC: 37 MG/DL — HIGH (ref 7–23)
BUN SERPL-MCNC: 38 MG/DL — HIGH (ref 7–23)
CALCIUM SERPL-MCNC: 10.7 MG/DL — HIGH (ref 8.4–10.5)
CALCIUM SERPL-MCNC: 11 MG/DL — HIGH (ref 8.4–10.5)
CHLORIDE SERPL-SCNC: 92 MMOL/L — LOW (ref 96–108)
CHLORIDE SERPL-SCNC: 96 MMOL/L — SIGNIFICANT CHANGE UP (ref 96–108)
CO2 BLDV-SCNC: 26 MMOL/L — SIGNIFICANT CHANGE UP (ref 22–30)
CO2 SERPL-SCNC: 25 MMOL/L — SIGNIFICANT CHANGE UP (ref 22–31)
CO2 SERPL-SCNC: 26 MMOL/L — SIGNIFICANT CHANGE UP (ref 22–31)
CREAT SERPL-MCNC: 0.88 MG/DL — SIGNIFICANT CHANGE UP (ref 0.5–1.3)
CREAT SERPL-MCNC: 0.93 MG/DL — SIGNIFICANT CHANGE UP (ref 0.5–1.3)
CULTURE RESULTS: SIGNIFICANT CHANGE UP
EOSINOPHIL # BLD AUTO: 0 K/UL — SIGNIFICANT CHANGE UP (ref 0–0.5)
EOSINOPHIL NFR BLD AUTO: 0 % — SIGNIFICANT CHANGE UP (ref 0–6)
GAS PNL BLDV: SIGNIFICANT CHANGE UP
GLUCOSE BLDC GLUCOMTR-MCNC: 144 MG/DL — HIGH (ref 70–99)
GLUCOSE BLDC GLUCOMTR-MCNC: 152 MG/DL — HIGH (ref 70–99)
GLUCOSE SERPL-MCNC: 138 MG/DL — HIGH (ref 70–99)
GLUCOSE SERPL-MCNC: 141 MG/DL — HIGH (ref 70–99)
HCO3 BLDV-SCNC: 25 MMOL/L — SIGNIFICANT CHANGE UP (ref 21–29)
HCT VFR BLD CALC: 29.6 % — LOW (ref 34.5–45)
HCT VFR BLD CALC: 29.7 % — LOW (ref 34.5–45)
HGB BLD-MCNC: 9.3 G/DL — LOW (ref 11.5–15.5)
HGB BLD-MCNC: 9.4 G/DL — LOW (ref 11.5–15.5)
IMM GRANULOCYTES NFR BLD AUTO: 1.1 % — SIGNIFICANT CHANGE UP (ref 0–1.5)
LACTATE BLDV-MCNC: 1.3 MMOL/L — SIGNIFICANT CHANGE UP (ref 0.7–2)
LYMPHOCYTES # BLD AUTO: 0.78 K/UL — LOW (ref 1–3.3)
LYMPHOCYTES # BLD AUTO: 3.6 % — LOW (ref 13–44)
MCHC RBC-ENTMCNC: 26.9 PG — LOW (ref 27–34)
MCHC RBC-ENTMCNC: 27 PG — SIGNIFICANT CHANGE UP (ref 27–34)
MCHC RBC-ENTMCNC: 31.4 GM/DL — LOW (ref 32–36)
MCHC RBC-ENTMCNC: 31.6 GM/DL — LOW (ref 32–36)
MCV RBC AUTO: 85.1 FL — SIGNIFICANT CHANGE UP (ref 80–100)
MCV RBC AUTO: 86 FL — SIGNIFICANT CHANGE UP (ref 80–100)
MONOCYTES # BLD AUTO: 1.14 K/UL — HIGH (ref 0–0.9)
MONOCYTES NFR BLD AUTO: 5.2 % — SIGNIFICANT CHANGE UP (ref 2–14)
NEUTROPHILS # BLD AUTO: 19.55 K/UL — HIGH (ref 1.8–7.4)
NEUTROPHILS NFR BLD AUTO: 90 % — HIGH (ref 43–77)
NRBC # BLD: 0 /100 WBCS — SIGNIFICANT CHANGE UP (ref 0–0)
PCO2 BLDV: 35 MMHG — SIGNIFICANT CHANGE UP (ref 35–50)
PH BLDV: 7.46 — HIGH (ref 7.35–7.45)
PLATELET # BLD AUTO: 640 K/UL — HIGH (ref 150–400)
PLATELET # BLD AUTO: 674 K/UL — HIGH (ref 150–400)
PO2 BLDV: 100 MMHG — HIGH (ref 25–45)
POTASSIUM SERPL-MCNC: 3.8 MMOL/L — SIGNIFICANT CHANGE UP (ref 3.5–5.3)
POTASSIUM SERPL-MCNC: 3.8 MMOL/L — SIGNIFICANT CHANGE UP (ref 3.5–5.3)
POTASSIUM SERPL-SCNC: 3.8 MMOL/L — SIGNIFICANT CHANGE UP (ref 3.5–5.3)
POTASSIUM SERPL-SCNC: 3.8 MMOL/L — SIGNIFICANT CHANGE UP (ref 3.5–5.3)
PROT SERPL-MCNC: 7 G/DL — SIGNIFICANT CHANGE UP (ref 6–8.3)
RBC # BLD: 3.44 M/UL — LOW (ref 3.8–5.2)
RBC # BLD: 3.49 M/UL — LOW (ref 3.8–5.2)
RBC # FLD: 14.3 % — SIGNIFICANT CHANGE UP (ref 10.3–14.5)
RBC # FLD: 14.4 % — SIGNIFICANT CHANGE UP (ref 10.3–14.5)
SAO2 % BLDV: 98 % — HIGH (ref 67–88)
SODIUM SERPL-SCNC: 133 MMOL/L — LOW (ref 135–145)
SODIUM SERPL-SCNC: 134 MMOL/L — LOW (ref 135–145)
SPECIMEN SOURCE: SIGNIFICANT CHANGE UP
WBC # BLD: 21.74 K/UL — HIGH (ref 3.8–10.5)
WBC # BLD: 22.77 K/UL — HIGH (ref 3.8–10.5)
WBC # FLD AUTO: 21.74 K/UL — HIGH (ref 3.8–10.5)
WBC # FLD AUTO: 22.77 K/UL — HIGH (ref 3.8–10.5)

## 2019-10-14 PROCEDURE — 93010 ELECTROCARDIOGRAM REPORT: CPT

## 2019-10-14 PROCEDURE — 74177 CT ABD & PELVIS W/CONTRAST: CPT | Mod: 26

## 2019-10-14 PROCEDURE — 99223 1ST HOSP IP/OBS HIGH 75: CPT

## 2019-10-14 RX ORDER — METRONIDAZOLE 500 MG
500 TABLET ORAL ONCE
Refills: 0 | Status: COMPLETED | OUTPATIENT
Start: 2019-10-14 | End: 2019-10-14

## 2019-10-14 RX ORDER — LACTULOSE 10 G/15ML
10 SOLUTION ORAL ONCE
Refills: 0 | Status: COMPLETED | OUTPATIENT
Start: 2019-10-14 | End: 2019-10-14

## 2019-10-14 RX ORDER — DOCUSATE SODIUM 100 MG
100 CAPSULE ORAL DAILY
Refills: 0 | Status: DISCONTINUED | OUTPATIENT
Start: 2019-10-14 | End: 2019-11-02

## 2019-10-14 RX ORDER — SENNA PLUS 8.6 MG/1
2 TABLET ORAL AT BEDTIME
Refills: 0 | Status: DISCONTINUED | OUTPATIENT
Start: 2019-10-14 | End: 2019-10-20

## 2019-10-14 RX ORDER — METRONIDAZOLE 500 MG
500 TABLET ORAL EVERY 8 HOURS
Refills: 0 | Status: DISCONTINUED | OUTPATIENT
Start: 2019-10-15 | End: 2019-10-15

## 2019-10-14 RX ORDER — METRONIDAZOLE 500 MG
TABLET ORAL
Refills: 0 | Status: DISCONTINUED | OUTPATIENT
Start: 2019-10-14 | End: 2019-10-15

## 2019-10-14 RX ORDER — CHLORHEXIDINE GLUCONATE 213 G/1000ML
1 SOLUTION TOPICAL DAILY
Refills: 0 | Status: DISCONTINUED | OUTPATIENT
Start: 2019-10-14 | End: 2019-11-04

## 2019-10-14 RX ADMIN — PANTOPRAZOLE SODIUM 40 MILLIGRAM(S): 20 TABLET, DELAYED RELEASE ORAL at 05:29

## 2019-10-14 RX ADMIN — Medication 650 MILLIGRAM(S): at 05:40

## 2019-10-14 RX ADMIN — Medication 650 MILLIGRAM(S): at 13:00

## 2019-10-14 RX ADMIN — SODIUM CHLORIDE 250 MILLILITER(S): 9 INJECTION INTRAMUSCULAR; INTRAVENOUS; SUBCUTANEOUS at 08:41

## 2019-10-14 RX ADMIN — SODIUM CHLORIDE 75 MILLILITER(S): 9 INJECTION INTRAMUSCULAR; INTRAVENOUS; SUBCUTANEOUS at 20:54

## 2019-10-14 RX ADMIN — PANTOPRAZOLE SODIUM 40 MILLIGRAM(S): 20 TABLET, DELAYED RELEASE ORAL at 17:12

## 2019-10-14 RX ADMIN — AMLODIPINE BESYLATE 2.5 MILLIGRAM(S): 2.5 TABLET ORAL at 05:29

## 2019-10-14 RX ADMIN — Medication 12.5 MILLIGRAM(S): at 05:29

## 2019-10-14 RX ADMIN — Medication 10 MILLIGRAM(S): at 23:54

## 2019-10-14 RX ADMIN — NORTRIPTYLINE HYDROCHLORIDE 25 MILLIGRAM(S): 10 CAPSULE ORAL at 22:10

## 2019-10-14 RX ADMIN — CHLORHEXIDINE GLUCONATE 1 APPLICATION(S): 213 SOLUTION TOPICAL at 11:44

## 2019-10-14 RX ADMIN — Medication 25 MILLIGRAM(S): at 05:29

## 2019-10-14 RX ADMIN — Medication 100 MILLIGRAM(S): at 20:54

## 2019-10-14 RX ADMIN — Medication 650 MILLIGRAM(S): at 12:13

## 2019-10-14 RX ADMIN — Medication 650 MILLIGRAM(S): at 06:45

## 2019-10-14 RX ADMIN — SODIUM CHLORIDE 75 MILLILITER(S): 9 INJECTION INTRAMUSCULAR; INTRAVENOUS; SUBCUTANEOUS at 08:41

## 2019-10-14 NOTE — CONSULT NOTE ADULT - SUBJECTIVE AND OBJECTIVE BOX
Chief Complaint:  Patient is a 89y old  Female who presents with a chief complaint of Weakness and anemia (14 Oct 2019 07:54)    HPI:  89 yo Female h/o DM2, Breast cancer, gastric ulcer, A.Fib (not on AC due to falls risk), HTN, HLD p/w worsening weakness and anemia. Pt was recently admitted for GIB roughly 3 weeks ago and on endoscopy was found to have a hiatus hernia with superficial ulcerations and erosions in the stomach and duodenum. This was in setting of increased NSAID use to control pain from shingles exacerbation. Pt received transfusions and was discharged to follow up with GI regarding biopsy results. Son and pt informed of H. pylori in biopsy. After lengthy family discussion, decision was made not to treat the H.pylori. Son noticed his mother getting weaker over past 1-2 weeks. Asked PMD to perform repeat blood test. Pt was found to have hgb of 7 and sent to ER for further evaluation. Pt denies any chest pain, SOB, palpitations. Endorses black mushy stool after starting iron in the hospital. Endorses waking up at night with sweats for many days. Denies any additional NSAID use.    During current hospitalization, patient noted to have increasing leukocytosis and treated for presumed UTI.  She was lethargic and encephalopathic yesterday per the son.  Her Hb has been fluctuating and she has been transfused 1 unit PRBCs.  He reports that the patient has been constipated and he has been feeding her more fiber today as she is awake.  He believes she has had several small dark BMs over the past 2 days.  She has been complaining of abdominal pain which he attributes to shingles.  She had been treated with gabapentin as an outpatient but it was discontinued as she was acting "loopy".  It was recently restarted at a lower dose.      Allergies:  penicillin (Unknown)    Hospital Medications:  acetaminophen    Suspension .. 650 milliGRAM(s) Oral every 4 hours PRN  amLODIPine   Tablet 2.5 milliGRAM(s) Oral daily  chlorhexidine 2% Cloths 1 Application(s) Topical daily  hydrochlorothiazide 12.5 milliGRAM(s) Oral daily  levoFLOXacin IVPB      levoFLOXacin IVPB 250 milliGRAM(s) IV Intermittent every 24 hours  metoprolol succinate ER 25 milliGRAM(s) Oral daily  metroNIDAZOLE  IVPB      nortriptyline 25 milliGRAM(s) Oral daily  ondansetron Injectable 4 milliGRAM(s) IV Push every 6 hours PRN  pantoprazole    Tablet 40 milliGRAM(s) Oral two times a day  sodium chloride 0.9%. 1000 milliLiter(s) IV Continuous <Continuous>      PMHX/PSHX:    Unsteady gait  Osteopenia  Injury of head, subsequent encounter  Fall, subsequent encounter  Malignant neoplasm of left breast in female, estrogen receptor positive, unspecified site of breast  Polymyalgia rheumatica  CAD (coronary artery disease)  Bleeding Duodenal Ulcer  Age-Related Hearing Loss  Hypercholesteremia  HTN (Hypertension)  Atrial Fibrillation  Closed fracture of patella  S/P colonoscopy  S/P breast biopsy, left  Atherosclerotic Coronary Vascular Disease  Stented Coronary Artery      Family history:  FH: breast cancer  No pertinent family history in first degree relatives      Social History: Denies smoking, alcohol, drug use    ROS:   General:  + weakness, sweats  Eyes:  Good vision, no reported pain  ENT:  No sore throat, pain, runny nose, dysphagia  CV:  No pain, palpitations, hypo/hypertension  Resp:  No dyspnea, cough, tachypnea, wheezing  GI:  See HPI  :  No pain, bleeding, incontinence, nocturia  Muscle:  No pain, weakness  Neuro:  No weakness, tingling, memory problems  Psych:  No fatigue, insomnia, mood problems, depression  Endocrine:  No polyuria, polydipsia, cold/heat intolerance  Heme:  No petechiae, ecchymosis, easy bruisability  Skin:  No rash, edema    Vital Signs:  Vital Signs Last 24 Hrs  T(C): 36.5 (14 Oct 2019 16:08), Max: 36.7 (14 Oct 2019 11:20)  T(F): 97.7 (14 Oct 2019 16:08), Max: 98 (14 Oct 2019 11:20)  HR: 86 (14 Oct 2019 16:08) (86 - 100)  BP: 179/69 (14 Oct 2019 17:19) (126/71 - 182/76)  BP(mean): --  RR: 18 (14 Oct 2019 16:08) (18 - 18)  SpO2: 94% (14 Oct 2019 16:08) (94% - 96%)      PHYSICAL EXAM:     GENERAL:  Appears stated age, somnolent but more awake per son  HEENT:  NC/AT,  conjunctivae clear and pink  NECK: supple  CHEST:  Full & symmetric excursion, no increased effort, breath sounds clear  HEART:  Regular rhythm, S1, S2, no murmur/rub/S3/S4, no abdominal bruit, no edema  ABDOMEN: Distended, normoactive bowel sounds, mild abdominal tenderness, greatest in lower abdomen  EXTREMITIES:  no cyanosis, clubbing or edema  SKIN:  Scaly rash in LUQ secondary to shingles  NEURO:  Alert, oriented but slow to respond  PSYCH: unable to assess        LABS:                        9.4    21.74 )-----------( 640      ( 14 Oct 2019 21:41 )             29.7     10-14    134<L>  |  96  |  37<H>  ----------------------------<  138<H>  3.8   |  25  |  0.93    Ca    10.7<H>      14 Oct 2019 21:41    TPro  7.0  /  Alb  2.7<L>  /  TBili  0.4  /  DBili  x   /  AST  47<H>  /  ALT  43  /  AlkPhos  172<H>  10-14    LIVER FUNCTIONS - ( 14 Oct 2019 21:41 )  Alb: 2.7 g/dL / Pro: 7.0 g/dL / ALK PHOS: 172 U/L / ALT: 43 U/L / AST: 47 U/L / GGT: x             Urinalysis Basic - ( 13 Oct 2019 13:48 )    Color: Yellow / Appearance: Turbid / S.042 / pH: x  Gluc: x / Ketone: Small  / Bili: Negative / Urobili: <2 mg/dL   Blood: x / Protein: 100 mg/dL / Nitrite: Positive   Leuk Esterase: Moderate / RBC: 20 /HPF / WBC 58 /HPF   Sq Epi: x / Non Sq Epi: 21 /HPF / Bacteria: TNTC          Imaging:

## 2019-10-14 NOTE — CONSULT NOTE ADULT - ASSESSMENT
1.  Anemia with occult positive stools.  Recent EGD in September 2019 with hiatus hernia, gastric ulcerations and duodenal erosions in setting of NSAID use and Helicobacter pylori.  2.  Abdominal distention and constipation.  Likely secondary to ileus in setting of infection.  Rule out partial obstruction, obstipation.  3.  Leukocytosis, encephalopathy with UA suggestive of UTI.  4.  CAD.  5.  Atrial fibrillation.  6.  PMR.  7.  History of breast cancer.    Recs:  - Monitor Hb daily, transfuse to Hb > 7  - Continue PPI.  - Diet as tolerated.  - Again discussed role of H.pylori in ulcerations and GI bleeding, but son is not interested in treating at this time.  - Abx per primary team.  - Miralax as needed for constipation.  - Given abdominal pain, distention, and leukocytosis, check imaging (preferably CT A/P) to evaluate for infectious process in abdomen.  - Will follow.

## 2019-10-14 NOTE — PROGRESS NOTE ADULT - SUBJECTIVE AND OBJECTIVE BOX
Subjective:    Exam:  Gen: AA NAD  Neck: no JVD  Chest: normal shape and expansion  Heart: RRR s M  Lungs: CTAB  Abdomen: Soft, NT, ND, no HSM, normoactive BS x 4  Ext: no CCE  Skin: no rash    Vital Signs Last 24 Hrs  T(C): 36.1 (14 Oct 2019 00:56), Max: 36.6 (13 Oct 2019 16:58)  T(F): 97 (14 Oct 2019 00:56), Max: 97.8 (13 Oct 2019 16:58)  HR: 100 (14 Oct 2019 05:24) (97 - 100)  BP: 158/81 (14 Oct 2019 05:24) (126/71 - 168/72)  BP(mean): --  RR: 18 (14 Oct 2019 00:56) (18 - 18)  SpO2: 96% (14 Oct 2019 00:56) (94% - 96%)    Daily     Daily     I&O's Detail    13 Oct 2019 07:01  -  14 Oct 2019 07:00  --------------------------------------------------------  IN:    Oral Fluid: 300 mL  Total IN: 300 mL    OUT:    Intermittent Catheterization - Urethral: 600 mL    Voided: 100 mL  Total OUT: 700 mL    Total NET: -400 mL          Daily     CAPILLARY BLOOD GLUCOSE      POCT Blood Glucose.: 142 mg/dL (13 Oct 2019 21:18)  POCT Blood Glucose.: 165 mg/dL (13 Oct 2019 18:03)  POCT Blood Glucose.: 218 mg/dL (13 Oct 2019 13:38)  POCT Blood Glucose.: 187 mg/dL (13 Oct 2019 10:03)      MEDICATIONS  (STANDING):  amLODIPine   Tablet 2.5 milliGRAM(s) Oral daily  hydrochlorothiazide 12.5 milliGRAM(s) Oral daily  levoFLOXacin IVPB      levoFLOXacin IVPB 250 milliGRAM(s) IV Intermittent every 24 hours  metoprolol succinate ER 25 milliGRAM(s) Oral daily  nortriptyline 25 milliGRAM(s) Oral daily  pantoprazole    Tablet 40 milliGRAM(s) Oral two times a day  sodium chloride 0.9% Bolus 500 milliLiter(s) IV Bolus once  sodium chloride 0.9%. 1000 milliLiter(s) (75 mL/Hr) IV Continuous <Continuous>    MEDICATIONS  (PRN):  acetaminophen    Suspension .. 650 milliGRAM(s) Oral every 4 hours PRN Temp greater or equal to 38C (100.4F), Moderate Pain (4 - 6)  ondansetron Injectable 4 milliGRAM(s) IV Push every 6 hours PRN Nausea and/or Vomiting      Labs:                          10.3   20.06 )-----------( 659      ( 13 Oct 2019 06:40 )             31.1   baso x      eos x      imm gran x      lymph x      mono x      poly x                            11.4   16.94 )-----------( 736      ( 12 Oct 2019 19:07 )             34.7   baso x      eos x      imm gran x      lymph x      mono x      poly x                            10.3   14.33 )-----------( 636      ( 12 Oct 2019 11:05 )             32.3   baso 0.0    eos 0.0    imm gran x      lymph 10.2   mono 6.8    poly 83.0                         7.7    14.57 )-----------( 652      ( 11 Oct 2019 21:49 )             25.2   baso 0.2    eos 0.5    imm gran 1.1    lymph 10.1   mono 10.2   poly 77.9               128<L>  |  90<L>  |  31<H>  ----------------------------<  209<H>  4.1   |  25  |  0.83    10-12 @ 08:02    134<L>  |  95<L>  |  17  ----------------------------<  124<H>  4.6   |  27  |  0.57    10-11 @ 21:49    135  |  96  |  21  ----------------------------<  128<H>  4.6   |  25  |  0.59      Ca    10.8<H>      10-13 @ 11:54  Ca    10.8<H>      10-12 @ 08:02  Ca    10.0      10-11 @ 21:49  Mg     1.9     10-12 @ 08:02    TPro  7.1  /  Alb  2.6<L>  /  TBili  0.5  /  DBili  x   /  AST  21  /  ALT  23  /  AlkPhos  172<H>  10-13 @ 11:54  TPro  7.2  /  Alb  2.8<L>  /  TBili  0.2  /  DBili  x   /  AST  26  /  ALT  35  /  AlkPhos  196<H>  10-11 @ 21:49      Urinalysis Basic - ( 13 Oct 2019 13:48 )  10-13 @ 11:54  Color: Yellow / Appearance: Turbid / S.042 / pH: x  Gluc: x / Ketone: Small  / Bili: Negative / Urobili: <2 mg/dL   Blood: x / Protein: 100 mg/dL / Nitrite: Positive   Leuk Esterase: Moderate / RBC: 20 /HPF / WBC 58 /HPF   Sq Epi: x / Non Sq Epi: 21 /HPF / Bacteria: TNTC        n/ @ 01:14  .Blood    No growth to date.  n/a  n/a  n/a  n/a  n/a    5FC: n/a  AMK: n/a  AMB:  n/a  AMP: n/a  A/S: n/a  AND: n/a  AZM: n/a  AZT: n/a  GORDY: n/a  CFZ: n/a  CFP: n/a  : n/a  CFX: n/a  CTZ: n/a  C/A: n/a  CTX: n/a  CFU: n/a  CHL: n/a  CIP: n/a  CLI: n/a  DAP: n/a  ERT: n/a  LUCIA: n/a  FLU: n/a  GEN: n/a  IMP: n/a  ITR: n/a  LEV: n/a  BRYANT: n/a  DAV: n/a  DONNA: n/a  MOX: n/a  OXA: n/a  PCN: n/a  P/T: n/a  POS: n/a  RIF: n/a  TCN: n/a  TGC: n/a  TOB: n/a  T/S: n/a  VAN: n/a  VORI: n/a  n/ @ 01:09  .Urine    <10,000 CFU/mL Normal Urogenital Mily  n/a  n/a  n/a  n/a  n/a    5FC: n/a  AMK: n/a  AMB:  n/a  AMP: n/a  A/S: n/a  AND: n/a  AZM: n/a  AZT: n/a  GORDY: n/a  CFZ: n/a  CFP: n/a  : n/a  CFX: n/a  CTZ: n/a  C/A: n/a  CTX: n/a  CFU: n/a  CHL: n/a  CIP: n/a  CLI: n/a  DAP: n/a  ERT: n/a  LUCIA: n/a  FLU: n/a  GEN: n/a  IMP: n/a  ITR: n/a  LEV: n/a  BRYANT: n/a  DAV: n/a  DONNA: n/a  MOX: n/a  OXA: n/a  PCN: n/a  P/T: n/a  POS: n/a  RIF: n/a  TCN: n/a  TGC: n/a  TOB: n/a  T/S: n/a  VAN: n/a  VORI: n/a Subjective: Was very lethargic and confused yesterday found to have significant pyuria started on IV Levaquin and is doing better today.  Also seem to be hypokalemic yesterday and was started on IV fluids, Fluid bolus was Ordered, but son refused son also was recommended to stop H. pylori treatment. Also, and physician recommended imaging for abdominal distention, but son refused	    Exam:  Gen: AA NAD  Neck: no JVD  Chest: normal shape and expansion  Heart: RRR s M  Lungs: CTAB  Abdomen: Soft, Mildly distended minimal tenderness no rebound, no guarding, no rigidity, no HSM, normoactive BS x 4  Ext: no CCE  Skin: no rash    Vital Signs Last 24 Hrs  T(C): 36.1 (14 Oct 2019 00:56), Max: 36.6 (13 Oct 2019 16:58)  T(F): 97 (14 Oct 2019 00:56), Max: 97.8 (13 Oct 2019 16:58)  HR: 100 (14 Oct 2019 05:24) (97 - 100)  BP: 158/81 (14 Oct 2019 05:24) (126/71 - 168/72)  BP(mean): --  RR: 18 (14 Oct 2019 00:56) (18 - 18)  SpO2: 96% (14 Oct 2019 00:56) (94% - 96%)    Daily     Daily     I&O's Detail    13 Oct 2019 07:01  -  14 Oct 2019 07:00  --------------------------------------------------------  IN:    Oral Fluid: 300 mL  Total IN: 300 mL    OUT:    Intermittent Catheterization - Urethral: 600 mL    Voided: 100 mL  Total OUT: 700 mL    Total NET: -400 mL          Daily     CAPILLARY BLOOD GLUCOSE      POCT Blood Glucose.: 142 mg/dL (13 Oct 2019 21:18)  POCT Blood Glucose.: 165 mg/dL (13 Oct 2019 18:03)  POCT Blood Glucose.: 218 mg/dL (13 Oct 2019 13:38)  POCT Blood Glucose.: 187 mg/dL (13 Oct 2019 10:03)      MEDICATIONS  (STANDING):  amLODIPine   Tablet 2.5 milliGRAM(s) Oral daily  hydrochlorothiazide 12.5 milliGRAM(s) Oral daily  levoFLOXacin IVPB      levoFLOXacin IVPB 250 milliGRAM(s) IV Intermittent every 24 hours  metoprolol succinate ER 25 milliGRAM(s) Oral daily  nortriptyline 25 milliGRAM(s) Oral daily  pantoprazole    Tablet 40 milliGRAM(s) Oral two times a day  sodium chloride 0.9% Bolus 500 milliLiter(s) IV Bolus once  sodium chloride 0.9%. 1000 milliLiter(s) (75 mL/Hr) IV Continuous <Continuous>    MEDICATIONS  (PRN):  acetaminophen    Suspension .. 650 milliGRAM(s) Oral every 4 hours PRN Temp greater or equal to 38C (100.4F), Moderate Pain (4 - 6)  ondansetron Injectable 4 milliGRAM(s) IV Push every 6 hours PRN Nausea and/or Vomiting      Labs:                          10.3   20.06 )-----------( 659      ( 13 Oct 2019 06:40 )             31.1   baso x      eos x      imm gran x      lymph x      mono x      poly x                            11.4   16.94 )-----------( 736      ( 12 Oct 2019 19:07 )             34.7   baso x      eos x      imm gran x      lymph x      mono x      poly x                            10.3   14.33 )-----------( 636      ( 12 Oct 2019 11:05 )             32.3   baso 0.0    eos 0.0    imm gran x      lymph 10.2   mono 6.8    poly 83.0                         7.7    14.57 )-----------( 652      ( 11 Oct 2019 21:49 )             25.2   baso 0.2    eos 0.5    imm gran 1.1    lymph 10.1   mono 10.2   poly 77.9               128<L>  |  90<L>  |  31<H>  ----------------------------<  209<H>  4.1   |  25  |  0.83    10-12 @ 08:02    134<L>  |  95<L>  |  17  ----------------------------<  124<H>  4.6   |  27  |  0.57    10-11 @ 21:49    135  |  96  |  21  ----------------------------<  128<H>  4.6   |  25  |  0.59      Ca    10.8<H>      10-13 @ 11:54  Ca    10.8<H>      10-12 @ 08:02  Ca    10.0      10-11 @ 21:49  Mg     1.9     10-12 @ 08:02    TPro  7.1  /  Alb  2.6<L>  /  TBili  0.5  /  DBili  x   /  AST  21  /  ALT  23  /  AlkPhos  172<H>  10-13 @ 11:54  TPro  7.2  /  Alb  2.8<L>  /  TBili  0.2  /  DBili  x   /  AST  26  /  ALT  35  /  AlkPhos  196<H>  10-11 @ 21:49      Urinalysis Basic - ( 13 Oct 2019 13:48 )  10-13 @ 11:54  Color: Yellow / Appearance: Turbid / S.042 / pH: x  Gluc: x / Ketone: Small  / Bili: Negative / Urobili: <2 mg/dL   Blood: x / Protein: 100 mg/dL / Nitrite: Positive   Leuk Esterase: Moderate / RBC: 20 /HPF / WBC 58 /HPF   Sq Epi: x / Non Sq Epi: 21 /HPF / Bacteria: TNTC        n/d84-82-09 @ 01:14  .Blood    No growth to date.  n/a  n/a  n/a  n/a  n/a    5FC: n/a  AMK: n/a  AMB:  n/a  AMP: n/a  A/S: n/a  AND: n/a  AZM: n/a  AZT: n/a  GORDY: n/a  CFZ: n/a  CFP: n/a  : n/a  CFX: n/a  CTZ: n/a  C/A: n/a  CTX: n/a  CFU: n/a  CHL: n/a  CIP: n/a  CLI: n/a  DAP: n/a  ERT: n/a  LUCIA: n/a  FLU: n/a  GEN: n/a  IMP: n/a  ITR: n/a  LEV: n/a  BRYANT: n/a  DAV: n/a  DONNA: n/a  MOX: n/a  OXA: n/a  PCN: n/a  P/T: n/a  POS: n/a  RIF: n/a  TCN: n/a  TGC: n/a  TOB: n/a  T/S: n/a  VAN: n/a  VORI: n/a  n/r04-61-55 @ 01:09  .Urine    <10,000 CFU/mL Normal Urogenital Mily  n/a  n/a  n/a  n/a  n/a    5FC: n/a  AMK: n/a  AMB:  n/a  AMP: n/a  A/S: n/a  AND: n/a  AZM: n/a  AZT: n/a  GORDY: n/a  CFZ: n/a  CFP: n/a  : n/a  CFX: n/a  CTZ: n/a  C/A: n/a  CTX: n/a  CFU: n/a  CHL: n/a  CIP: n/a  CLI: n/a  DAP: n/a  ERT: n/a  LUCIA: n/a  FLU: n/a  GEN: n/a  IMP: n/a  ITR: n/a  LEV: n/a  BRYANT: n/a  DAV: n/a  DONNA: n/a  MOX: n/a  OXA: n/a  PCN: n/a  P/T: n/a  POS: n/a  RIF: n/a  TCN: n/a  TGC: n/a  TOB: n/a  T/S: n/a  VAN: n/a  VORI: n/a

## 2019-10-15 DIAGNOSIS — R93.5 ABNORMAL FINDINGS ON DIAGNOSTIC IMAGING OF OTHER ABDOMINAL REGIONS, INCLUDING RETROPERITONEUM: ICD-10-CM

## 2019-10-15 DIAGNOSIS — Z71.89 OTHER SPECIFIED COUNSELING: ICD-10-CM

## 2019-10-15 LAB
ALBUMIN SERPL ELPH-MCNC: 2.5 G/DL — LOW (ref 3.3–5)
ALP SERPL-CCNC: 171 U/L — HIGH (ref 40–120)
ALT FLD-CCNC: 42 U/L — SIGNIFICANT CHANGE UP (ref 10–45)
ANION GAP SERPL CALC-SCNC: 18 MMOL/L — HIGH (ref 5–17)
AST SERPL-CCNC: 39 U/L — SIGNIFICANT CHANGE UP (ref 10–40)
BASOPHILS # BLD AUTO: 0.04 K/UL — SIGNIFICANT CHANGE UP (ref 0–0.2)
BASOPHILS NFR BLD AUTO: 0.2 % — SIGNIFICANT CHANGE UP (ref 0–2)
BILIRUB SERPL-MCNC: 0.4 MG/DL — SIGNIFICANT CHANGE UP (ref 0.2–1.2)
BUN SERPL-MCNC: 39 MG/DL — HIGH (ref 7–23)
CALCIUM SERPL-MCNC: 10.7 MG/DL — HIGH (ref 8.4–10.5)
CHLORIDE SERPL-SCNC: 98 MMOL/L — SIGNIFICANT CHANGE UP (ref 96–108)
CO2 SERPL-SCNC: 22 MMOL/L — SIGNIFICANT CHANGE UP (ref 22–31)
CREAT SERPL-MCNC: 1.14 MG/DL — SIGNIFICANT CHANGE UP (ref 0.5–1.3)
CULTURE RESULTS: SIGNIFICANT CHANGE UP
EOSINOPHIL # BLD AUTO: 0 K/UL — SIGNIFICANT CHANGE UP (ref 0–0.5)
EOSINOPHIL NFR BLD AUTO: 0 % — SIGNIFICANT CHANGE UP (ref 0–6)
GLUCOSE BLDC GLUCOMTR-MCNC: 134 MG/DL — HIGH (ref 70–99)
GLUCOSE BLDC GLUCOMTR-MCNC: 165 MG/DL — HIGH (ref 70–99)
GLUCOSE SERPL-MCNC: 148 MG/DL — HIGH (ref 70–99)
HCT VFR BLD CALC: 30.5 % — LOW (ref 34.5–45)
HGB BLD-MCNC: 9.6 G/DL — LOW (ref 11.5–15.5)
IMM GRANULOCYTES NFR BLD AUTO: 1.5 % — SIGNIFICANT CHANGE UP (ref 0–1.5)
LYMPHOCYTES # BLD AUTO: 0.89 K/UL — LOW (ref 1–3.3)
LYMPHOCYTES # BLD AUTO: 4.2 % — LOW (ref 13–44)
MCHC RBC-ENTMCNC: 27.2 PG — SIGNIFICANT CHANGE UP (ref 27–34)
MCHC RBC-ENTMCNC: 31.5 GM/DL — LOW (ref 32–36)
MCV RBC AUTO: 86.4 FL — SIGNIFICANT CHANGE UP (ref 80–100)
MONOCYTES # BLD AUTO: 1.54 K/UL — HIGH (ref 0–0.9)
MONOCYTES NFR BLD AUTO: 7.3 % — SIGNIFICANT CHANGE UP (ref 2–14)
NEUTROPHILS # BLD AUTO: 18.26 K/UL — HIGH (ref 1.8–7.4)
NEUTROPHILS NFR BLD AUTO: 86.8 % — HIGH (ref 43–77)
PLATELET # BLD AUTO: 625 K/UL — HIGH (ref 150–400)
POTASSIUM SERPL-MCNC: 3.6 MMOL/L — SIGNIFICANT CHANGE UP (ref 3.5–5.3)
POTASSIUM SERPL-SCNC: 3.6 MMOL/L — SIGNIFICANT CHANGE UP (ref 3.5–5.3)
PROT SERPL-MCNC: 6.8 G/DL — SIGNIFICANT CHANGE UP (ref 6–8.3)
RBC # BLD: 3.53 M/UL — LOW (ref 3.8–5.2)
RBC # FLD: 14.6 % — HIGH (ref 10.3–14.5)
SODIUM SERPL-SCNC: 138 MMOL/L — SIGNIFICANT CHANGE UP (ref 135–145)
WBC # BLD: 21.04 K/UL — HIGH (ref 3.8–10.5)
WBC # FLD AUTO: 21.04 K/UL — HIGH (ref 3.8–10.5)

## 2019-10-15 PROCEDURE — 99222 1ST HOSP IP/OBS MODERATE 55: CPT

## 2019-10-15 PROCEDURE — 99233 SBSQ HOSP IP/OBS HIGH 50: CPT | Mod: GC

## 2019-10-15 RX ORDER — POLYETHYLENE GLYCOL 3350 17 G/17G
17 POWDER, FOR SOLUTION ORAL DAILY
Refills: 0 | Status: DISCONTINUED | OUTPATIENT
Start: 2019-10-15 | End: 2019-10-20

## 2019-10-15 RX ORDER — CEFEPIME 1 G/1
1000 INJECTION, POWDER, FOR SOLUTION INTRAMUSCULAR; INTRAVENOUS EVERY 24 HOURS
Refills: 0 | Status: DISCONTINUED | OUTPATIENT
Start: 2019-10-15 | End: 2019-10-17

## 2019-10-15 RX ADMIN — PANTOPRAZOLE SODIUM 40 MILLIGRAM(S): 20 TABLET, DELAYED RELEASE ORAL at 17:40

## 2019-10-15 RX ADMIN — CHLORHEXIDINE GLUCONATE 1 APPLICATION(S): 213 SOLUTION TOPICAL at 12:18

## 2019-10-15 RX ADMIN — PANTOPRAZOLE SODIUM 40 MILLIGRAM(S): 20 TABLET, DELAYED RELEASE ORAL at 05:56

## 2019-10-15 RX ADMIN — POLYETHYLENE GLYCOL 3350 17 GRAM(S): 17 POWDER, FOR SOLUTION ORAL at 12:18

## 2019-10-15 RX ADMIN — Medication 25 MILLIGRAM(S): at 05:56

## 2019-10-15 RX ADMIN — SODIUM CHLORIDE 75 MILLILITER(S): 9 INJECTION INTRAMUSCULAR; INTRAVENOUS; SUBCUTANEOUS at 12:18

## 2019-10-15 RX ADMIN — AMLODIPINE BESYLATE 2.5 MILLIGRAM(S): 2.5 TABLET ORAL at 05:56

## 2019-10-15 RX ADMIN — CEFEPIME 100 MILLIGRAM(S): 1 INJECTION, POWDER, FOR SOLUTION INTRAMUSCULAR; INTRAVENOUS at 17:39

## 2019-10-15 RX ADMIN — LACTULOSE 10 GRAM(S): 10 SOLUTION ORAL at 00:48

## 2019-10-15 RX ADMIN — Medication 1 ENEMA: at 12:18

## 2019-10-15 RX ADMIN — SENNA PLUS 2 TABLET(S): 8.6 TABLET ORAL at 22:04

## 2019-10-15 RX ADMIN — Medication 100 MILLIGRAM(S): at 05:56

## 2019-10-15 RX ADMIN — NORTRIPTYLINE HYDROCHLORIDE 25 MILLIGRAM(S): 10 CAPSULE ORAL at 22:04

## 2019-10-15 RX ADMIN — Medication 100 MILLIGRAM(S): at 13:14

## 2019-10-15 RX ADMIN — Medication 12.5 MILLIGRAM(S): at 05:56

## 2019-10-15 RX ADMIN — Medication 100 MILLIGRAM(S): at 12:18

## 2019-10-15 NOTE — CONSULT NOTE ADULT - ASSESSMENT
89F w/ hx of DM2, Breast cancer, gastric ulcer, afib not on AC due to falls risk, HTN, HLD p/w worsening weakness and anemia, treated for UTI, found to have lytic lesions on CT abdomen    #lytic lesions on CT- with history of Breast cancer Stage 1c mucinous, ER+/[place]+/Hiz1tmw - s/p lumpectomy- patient had refused any adjuvant hormonal treatment; recently CA 27-29 with slight trend up though still normal; mammogram/US 10/30/18- negative  - likely metastatic breast cancer; discussion with PMD noted- plan had been to investigate further and then decide regarding treatment; currently patient states that she is tired and would rather not discuss this further  - d/w Dr. Martinez- plan had been to check Chest CT/bone scan/ MRI brain for further evaluation- then will discuss further regarding biopsy     #Anemia, normocytic- likely multifactorial- AOCD + GI loss with guaiac positive stool  - recent EGD with gastritis/duodenal ulcer, guaiac + this admission  - s/p 1 unit PRBC 10/11  - will check iron studies/b12/folate  - Hg is stable- monitor    #thrombocytosis- likely reactive in setting of acute illness/ UTI/ recent GIB  - platelet count had been normal 2018, monitor    #ID- on Abx for UTI    d/w Dr. Martinez on phone  d/w secondary HCP Dr. Zendejas, briefly at bedside; change in primary HCP noted from son to relative Primitivo Zendejas 89F w/ hx of DM2, Breast cancer, gastric ulcer, afib not on AC due to falls risk, HTN, HLD p/w worsening weakness and anemia, treated for UTI, found to have lytic lesions on CT abdomen    #lytic lesions on CT- with history of Breast cancer Stage 1c mucinous, ER+/[place]+/Jar5htq - s/p lumpectomy- patient had refused any adjuvant hormonal treatment; recently CA 27-29 with slight trend up though still normal; mammogram/US 10/30/18- negative  - likely metastatic breast cancer; discussion with PMD noted- plan had been to investigate further and then decide regarding treatment; currently patient states that she is tired and would rather not discuss this further  - d/w Dr. Martinez- plan had been to check Chest CT/bone scan/ MRI brain for further evaluation- then will discuss further regarding biopsy     #Anemia, normocytic- likely multifactorial- AOCD + GI loss with guaiac positive stool  - recent EGD with gastritis/duodenal ulcer, guaiac + this admission  - s/p 1 unit PRBC 10/11  - will check iron studies/b12/folate  - Hg is stable- monitor    #thrombocytosis- likely reactive in setting of acute illness/ UTI/ recent GIB  - platelet count had been normal 2018, monitor    #hypercalcemia- likely secondary to malignancy +/- volume depletion  - agree with DC'ing HCTZ  - on hydration  - check PTH, PTHrP  - Ca trending down, continue to monitor; if persistently high, would give calcitonin    #ID- on Abx for UTI    d/w Dr. Martinez on phone  d/w secondary HCP Dr. Zendejas, briefly at bedside; change in primary HCP noted from son to relative Primitivo Zendejas

## 2019-10-15 NOTE — DIETITIAN INITIAL EVALUATION ADULT. - REASON INDICATOR FOR ASSESSMENT
Pt seen for nutrition consult for assessment as well as BMI <19. Information obtained from RN, RD attempted to speak with patient multiple times throughout the day however sleeping soundly at each attempt despite RD efforts to wake pt. Pt woke briefly but promptly went back to sleep.

## 2019-10-15 NOTE — DIETITIAN INITIAL EVALUATION ADULT. - ADD RECOMMEND
1. Obtain subjective information as able-pt sleeping after multiple attempts today, 2. RD to add diet mighty shake 2 daily to further optimize po intake for now-monitor pt amenability to further oral supplements, 3. Continue to encourage po intake and obtain/honor food preferences as able, 4. Monitor PO intake, diet tolerance, weight trends, labs, and skin integrity

## 2019-10-15 NOTE — DIETITIAN INITIAL EVALUATION ADULT. - ENERGY INTAKE
Poor (<50%)/Pt with ongoing decreased appetite in house, observed breakfast tray with bites of oatmeal, bread, and fruit consumed, observed lunch with bites of fruit consumed, pt also with applesauce open at bedside again with bites consumed.

## 2019-10-15 NOTE — DIETITIAN INITIAL EVALUATION ADULT. - ENERGY NEEDS
Pt with PMH Including breast cancer, type 2 DM, gastric ulcer, HTN, hyperlipidemia admitted with increased weakness and anemia, pt with recent admission for GIB due to gastric and duodenal ulcer found to have H. Pylori however did not pursue treatment. In house pt treated for UTI found to have lytic lesions on CT scan concerning for metastatic disease.     Ht: 5'0" , Wt: 91.7 lbs, BMI: 17.9 kg/m2, IBW: 100 lbs +/- 10%, %IBW: 92%  no edema, Skin intact

## 2019-10-15 NOTE — CONSULT NOTE ADULT - SUBJECTIVE AND OBJECTIVE BOX
Patient is a 89y old  Female who presents with a chief complaint of Weakness and anemia (15 Oct 2019 11:03)      HPI:  89F w/ hx of DM2, Breast cancer, gastric ulcer, afib not on AC due to falls risk, HTN, HLD p/w worsening weakness and anemia. Pt was recently admitted for GIB roughly 3 weeks ago and upon endoscopy was found to have gastritis and duodenal ulcer. This was in setting of increased NSAID use to control pain from shingles exacerbation. Pt received transfusions and was discharged to follow up with GI regarding biopsy results. Son and pt informed of H. pyolri in biopsy. After lengthy family discussion, decision was made not to treat. Son noticed his mother getting weaker over past 1-2 weeks. Asked PMD to perform repeat blood test. Pt was found to have hgb of 7 and sent to ER for further evaluation. Pt denies any chest pain, SOB, palpitations. Endorses black mushy stool after starting iron in the hospital. Endorses waking up at night with sweats for many days. Denies any additional NSAID use.    In ER: Given acetaminophen (12 Oct 2019 02:58)    chart reviewed, s/p PRBC on admission, developed increased confusion, treated for UTI; CT abdomen for distention with lytic lesions and ? liver lesions    pt currently sitting upright in bed- very tired, repeatedly closes eyes, requires prompting to answer questions. main complaint currently is fatigue. no dyspnea/cough, no chest pain, no n/v, some abd discomfort, +pain in LUQ with Shingles; no bleeding, dark stool after iron; does report weight loss and poor appetite recently but does not specifically recall events prior to admission      PAST MEDICAL & SURGICAL HISTORY:  Unsteady gait  Osteopenia  Injury of head, subsequent encounter: s/p fall 2 years ago   had bleed into area now ok.  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, estrogen receptor positive, unspecified site of breast: ER/IN positive  HER Negative (per family)  Polymyalgia rheumatica  CAD (coronary artery disease)  Bleeding Duodenal Ulcer: resolved several years ago  Age-Related Hearing Loss: wears hearing aids  Hypercholesteremia  HTN (Hypertension)  Atrial Fibrillation  Closed fracture of patella: left   surgery with pins/screws  4- 5 years ago  S/P colonoscopy: 8 years ago negative  S/P breast biopsy, left  Atherosclerotic Coronary Vascular Disease: coronary stent placement in   Stented Coronary Artery: PCI X 1      SOCIAL HISTORY:  Smoking - Non smoker   Alcohol - Social  Drugs - No drug use  lives alone    FAMILY HISTORY:  daughters x 2- breast cancer, 1  at 41, 1  at 62  BRACA1 positive- daughter      MEDICATIONS  (STANDING):  amLODIPine   Tablet 2.5 milliGRAM(s) Oral daily  chlorhexidine 2% Cloths 1 Application(s) Topical daily  docusate sodium 100 milliGRAM(s) Oral daily  levoFLOXacin IVPB      levoFLOXacin IVPB 250 milliGRAM(s) IV Intermittent every 24 hours  metoprolol succinate ER 25 milliGRAM(s) Oral daily  metroNIDAZOLE  IVPB 500 milliGRAM(s) IV Intermittent every 8 hours  metroNIDAZOLE  IVPB      nortriptyline 25 milliGRAM(s) Oral daily  pantoprazole    Tablet 40 milliGRAM(s) Oral two times a day  polyethylene glycol 3350 17 Gram(s) Oral daily  senna 2 Tablet(s) Oral at bedtime  sodium chloride 0.9%. 1000 milliLiter(s) (75 mL/Hr) IV Continuous <Continuous>    MEDICATIONS  (PRN):  acetaminophen    Suspension .. 650 milliGRAM(s) Oral every 4 hours PRN Temp greater or equal to 38C (100.4F), Moderate Pain (4 - 6)  ondansetron Injectable 4 milliGRAM(s) IV Push every 6 hours PRN Nausea and/or Vomiting      Allergies    penicillin (Unknown)    ROS:  gen- +weight loss, +poor appetite, +fatigue/weakness, no f/c  heent- no epistaxis/gingival bld, no sore throat  cv- no chest pain, no palpitation  resp- no dyspnea, no cough  gi- no n/v, +abd distention/discomfort, dark stool prior, no brbpr  gu- no hematuria  ext- no swelling  skin- healing rash dermatomal pattern LUQ  musculosk- intermittent low back pain  neuro- no HA, no numbness/tingling  ROS otherwise reviewed and negative        Vital Signs Last 24 Hrs  T(C): 36.6 (15 Oct 2019 10:30), Max: 37.4 (14 Oct 2019 23:23)  T(F): 97.9 (15 Oct 2019 10:30), Max: 99.3 (14 Oct 2019 23:23)  HR: 94 (15 Oct 2019 10:30) (86 - 103)  BP: 172/70 (15 Oct 2019 10:30) (167/73 - 182/76)  BP(mean): --  RR: 18 (15 Oct 2019 10:30) (18 - 18)  SpO2: 95% (15 Oct 2019 10:30) (94% - 97%)    PHYSICAL EXAM  General: elderly frail adult in NAD  HEENT: clear oropharynx, anicteric sclera, pink conjunctiva  Neck: supple  CV: normal S1 S2  Lungs: decreased BS bilateral, poor effort  Abdomen: soft non-tender, +distended, positive bowel sounds  Ext: no clubbing cyanosis or edema  Skin: no rashes and no petechiae  Lymph Nodes: No LAD in axillae, groin, neck  Neuro: alert and oriented X 3 though extremely tired, no focal deficits    LABS:                          9.6    21.04 )-----------( 625      ( 15 Oct 2019 09:12 )             30.5         Mean Cell Volume : 86.4 fl  Mean Cell Hemoglobin : 27.2 pg  Mean Cell Hemoglobin Concentration : 31.5 gm/dL  Auto Neutrophil # : 18.26 K/uL  Auto Lymphocyte # : 0.89 K/uL  Auto Monocyte # : 1.54 K/uL  Auto Eosinophil # : 0.00 K/uL  Auto Basophil # : 0.04 K/uL  Auto Neutrophil % : 86.8 %  Auto Lymphocyte % : 4.2 %  Auto Monocyte % : 7.3 %  Auto Eosinophil % : 0.0 %  Auto Basophil % : 0.2 %      Serial CBC's  10-15 @ 09:12  Hct-30.5 / Hgb-9.6 / Plat-625 / RBC-3.53 / WBC-21.04  Serial CBC's  10-14 @ 21:41  Hct-29.7 / Hgb-9.4 / Plat-640 / RBC-3.49 / WBC-21.74  Serial CBC's  10-14 @ 08:26  Hct-29.6 / Hgb-9.3 / Plat-674 / RBC-3.44 / WBC-22.77  Serial CBC's  10-13 @ 06:40  Hct-31.1 / Hgb-10.3 / Plat-659 / RBC-3.76 / WBC-20.06  Serial CBC's  10-12 @ 19:07  Hct-34.7 / Hgb-11.4 / Plat-736 / RBC-4.17 / WBC-16.94  Serial CBC's  10-12 @ 11:05  Hct-32.3 / Hgb-10.3 / Plat-636 / RBC-3.79 / WBC-14.33  Serial CBC's  10-11 @ 21:49  Hct-25.2 / Hgb-7.7 / Plat-652 / RBC-2.98 / WBC-14.57      10-15    138  |  98  |  39<H>  ----------------------------<  148<H>  3.6   |  22  |  1.14    Ca    10.7<H>      15 Oct 2019 07:15    TPro  6.8  /  Alb  2.5<L>  /  TBili  0.4  /  DBili  x   /  AST  39  /  ALT  42  /  AlkPhos  171<H>  10-15                        Radiology:    < from: CT Abdomen and Pelvis w/ IV Cont (10.14.19 @ 20:43) >    IMPRESSION: Lytic osseous lesions suspicious for metastatic disease.    Mild bilateral hydronephrosis.    Cluster of hypodense lesions in the right hepatic lobe is indeterminate.    < end of copied text >

## 2019-10-15 NOTE — CHART NOTE - NSCHARTNOTEFT_GEN_A_CORE
Upon Nutritional Assessment by the Registered Dietitian your patient was determined to meet criteria / has evidence of the following diagnosis/diagnoses:          [ x]  Mild Protein Calorie Malnutrition        [ ]  Moderate Protein Calorie Malnutrition        [ ] Severe Protein Calorie Malnutrition        [ ] Unspecified Protein Calorie Malnutrition        [ ] Underweight / BMI <19        [ ] Morbid Obesity / BMI > 40      Findings as based on:  [x ] Comprehensive nutrition assessment: ? 10% wt loss within 1 month, poor po intake PTA ongoing in house  [ ] Nutrition Focused Physical Exam  [ ] Other:       Nutrition Plan/Recommendations:  Will provide Diet mighty shakes 2 daily to further optimize intake, will follow up for further interview, pt sleeping after multiple attempts today        PROVIDER Section:     By signing this assessment you are acknowledging and agree with the diagnosis/diagnoses assigned by the Registered Dietitian    Comments:

## 2019-10-15 NOTE — DIETITIAN INITIAL EVALUATION ADULT. - OTHER INFO
Diet PTA: Unknown, Pt recently admitted 3 weeks ago for GI bleed per H&P pt noted to have become weaker over the past 1-2 weeks, suspect poor intake PTA since discharge. Per oncology note pt noted with decreased appetite.     Pt with type 2 DM noted HgbA1C 9/19 7.5% indicating adequate control for advanced age, pt currently ordered for consistent carbohydrate, low sodium, lacto-ovo vegetarian diet.     Weight: Unknown UBW however pt appears to have had a 10 lb (10%) wt loss within the past month from previous admission weight 101.4 lbs (9/19 bed) to current admission weight 91.7 lbs (10/11), RD to clarify weight trends as able. Per oncology note pt endorsed weight loss but no timeframe specified.     Pt with no noted food allergies, noted to be taking iron at home. No N+V per RN, last BM noted yesterday-per RN some constipation. Pt noted to have no difficulty chewing/swallowing per pt profile, per RN pt is slow to swallow at times, no overt coughing.     Diet education deferred at this time: pt sleeping and not able to engage in interview

## 2019-10-15 NOTE — PHYSICAL THERAPY INITIAL EVALUATION ADULT - ADDITIONAL COMMENTS
CT ABDOMEN/PELVIS: Lytic osseous lesions suspicious for metastatic disease.  Mild bilateral hydronephrosis. Cluster of hypodense lesions in the right hepatic lobe is indeterminate.

## 2019-10-15 NOTE — PROGRESS NOTE ADULT - SUBJECTIVE AND OBJECTIVE BOX
INTERVAL HPI/OVERNIGHT EVENTS:  Patient awake in bed, reports she ate a small amount of her breakfast, denies any nausea or vomiting, no abdominal discomfort      MEDICATIONS  (STANDING):  amLODIPine   Tablet 2.5 milliGRAM(s) Oral daily  chlorhexidine 2% Cloths 1 Application(s) Topical daily  docusate sodium 100 milliGRAM(s) Oral daily  levoFLOXacin IVPB      levoFLOXacin IVPB 250 milliGRAM(s) IV Intermittent every 24 hours  metoprolol succinate ER 25 milliGRAM(s) Oral daily  metroNIDAZOLE  IVPB 500 milliGRAM(s) IV Intermittent every 8 hours  metroNIDAZOLE  IVPB      nortriptyline 25 milliGRAM(s) Oral daily  pantoprazole    Tablet 40 milliGRAM(s) Oral two times a day  polyethylene glycol 3350 17 Gram(s) Oral daily  saline laxative (FLEET) Rectal Enema 1 Enema Rectal once  senna 2 Tablet(s) Oral at bedtime  sodium chloride 0.9%. 1000 milliLiter(s) (75 mL/Hr) IV Continuous <Continuous>    MEDICATIONS  (PRN):  acetaminophen    Suspension .. 650 milliGRAM(s) Oral every 4 hours PRN Temp greater or equal to 38C (100.4F), Moderate Pain (4 - 6)  ondansetron Injectable 4 milliGRAM(s) IV Push every 6 hours PRN Nausea and/or Vomiting      Allergies    penicillin (Unknown)    Intolerances        Review of Systems:    General:  No fever or chills   ENT:  No sore throat, or  dysphagia  CV:  No chest pain  Resp:  No dyspnea or cough  GI: denies any abdominal discomfort BM yesterday per documentation         Vital Signs Last 24 Hrs  T(C): 36.6 (15 Oct 2019 10:30), Max: 37.4 (14 Oct 2019 23:23)  T(F): 97.9 (15 Oct 2019 10:30), Max: 99.3 (14 Oct 2019 23:23)  HR: 94 (15 Oct 2019 10:30) (86 - 103)  BP: 172/70 (15 Oct 2019 10:30) (167/73 - 182/76)  BP(mean): --  RR: 18 (15 Oct 2019 10:30) (18 - 18)  SpO2: 95% (15 Oct 2019 10:30) (94% - 97%)    PHYSICAL EXAM:    Constitutional: thin female , non toxic NAD  Neck:  supple  Respiratory: anterior chest wall clear to auscultation witjh no rales, rhonchi or  wheezing  Cardiovascular: S1 and S2, RRR  Gastrointestinal: mild distended girth , + bowel sounds . non tender, no dark stools reported ,  Extremities: No peripheral edema or cyanosis  Neurological: A/O x 3, no focal deficits  Psychiatric: Normal mood, normal affect  Skin: No visible rashes      LABS:                        9.6    21.04 )-----------( 625      ( 15 Oct 2019 09:12 )             30.5     10-15  Hemoglobin: 9.6 g/dL <L> [11.5 - 15.5] (10-15 @ 09:12)  Hemoglobin: 9.4 g/dL <L> [11.5 - 15.5] (10-14 @ 21:41)  Hemoglobin: 9.3 g/dL <L> [11.5 - 15.5] (10-14 @ 08:26)  Hemoglobin: 10.3 g/dL <L> [11.5 - 15.5] (10-13 @ 06:40)  Hemoglobin: 11.4 g/dL <L> [11.5 - 15.5] (10-12 @ 19:07)          Albumin, Serum: 2.5 g/dL <L> [3.3 - 5.0] (10-15 @ 07:15)  Aspartate Aminotransferase (AST/SGOT): 39 U/L [10 - 40] (10-15 @ 07:15)  Alanine Aminotransferase (ALT/SGPT): 42 U/L [10 - 45] (10-15 @ 07:15)  Albumin, Serum: 2.7 g/dL <L> [3.3 - 5.0] (10-14 @ 21:41)  Aspartate Aminotransferase (AST/SGOT): 47 U/L <H> [10 - 40] (10-14 @ 21:41)  Alanine Aminotransferase (ALT/SGPT): 43 U/L [10 - 45] (10-14 @ 21:41)  Bilirubin: Negative (10-13 @ 13:48)  Bilirubin: Negative (10-13 @ 13:47)  Albumin, Serum: 2.6 g/dL <L> [3.3 - 5.0] (10-13 @ 11:54)  Aspartate Aminotransferase (AST/SGOT): 21 U/L [10 - 40] (10-13 @ 11:54)  Alanine Aminotransferase (ALT/SGPT): 23 U/L [10 - 45] (10-13 @ 11:54)      138  |  98  |  39<H>  ----------------------------<  148<H>  3.6   |  22  |  1.14    Ca    10.7<H>      15 Oct 2019 07:15    TPro  6.8  /  Alb  2.5<L>  /  TBili  0.4  /  DBili  x   /  AST  39  /  ALT  42  /  AlkPhos  171<H>  10-15      Urinalysis Basic - ( 13 Oct 2019 13:48 )    Color: Yellow / Appearance: Turbid / S.042 / pH: x  Gluc: x / Ketone: Small  / Bili: Negative / Urobili: <2 mg/dL   Blood: x / Protein: 100 mg/dL / Nitrite: Positive   Leuk Esterase: Moderate / RBC: 20 /HPF / WBC 58 /HPF   Sq Epi: x / Non Sq Epi: 21 /HPF / Bacteria: TNTC      LIVER FUNCTIONS - ( 15 Oct 2019 07:15 )  Alb: 2.5 g/dL / Pro: 6.8 g/dL / ALK PHOS: 171 U/L / ALT: 42 U/L / AST: 39 U/L / GGT: x             RADIOLOGY & ADDITIONAL TESTS:  < from: CT Abdomen and Pelvis w/ IV Cont (10.14.19 @ 20:43) >  EXAM:  CT ABDOMEN AND PELVIS IC                            PROCEDURE DATE:  10/14/2019            INTERPRETATION:  CLINICAL INFORMATION: Abdominal pain.    COMPARISON: 2012.    PROCEDURE:   CT of the Abdomen and Pelvis was performed with intravenous contrast.   Intravenous contrast: 90 ml Omnipaque 350. 10 ml discarded.  Oral contrast: None.  Sagittal and coronal reformats were performed.    FINDINGS:.    LOWER CHEST: Small left pleural effusion. Subcentimeter subpleural left   lower lobe pulmonary nodule.    LIVER: Indeterminant cluster of hypodense lesions in the right hepatic   lobe on series 3 image 25.  BILE DUCTS: Normal caliber.  GALLBLADDER: Within normal limits.  SPLEEN: Within normal limits.   PANCREAS: Within normal limits.  ADRENALS: Within normal limits.   KIDNEYS/URETERS: Mild bilateral hydronephrosis. Cysts.     BLADDER: Within normal limits.   REPRODUCTIVE ORGANS: Within normal limits.    BOWEL: No bowel obstruction.  PERITONEUM: No ascites.   VESSELS:  Within normal limits.  RETROPERITONEUM/LYMPH NODES: No lymphadenopathy.     ABDOMINAL WALL: Within normal limits.  BONES: Scattered lytic osseous lesions.    IMPRESSION: Lytic osseous lesions suspicious for metastatic disease.    Mild bilateral hydronephrosis.    Cluster of hypodense lesions in the right hepatic lobe is indeterminate.                      JENNIFER STOCKTON M.D., ATTENDING RADIOLOGIST  This document has been electronically signed. Oct 15 2019  8:41AM                < end of copied text >

## 2019-10-15 NOTE — DIETITIAN INITIAL EVALUATION ADULT. - PROBLEM SELECTOR PLAN 3
Pt with H. pylori diagnosed on recent ulcer. reportedly decision was made not to Rx? Also note reported penicillin allergy, pt does not know what reaction is.  -F/u GI recommendations regarding Rx of H. Pylori. Pt and son would prefer to follow Rx plan of specialist.  -Consider ID?

## 2019-10-15 NOTE — DIETITIAN INITIAL EVALUATION ADULT. - MALNUTRITION
mild, pt may meet criteria for more advanced malnutrition however insufficient evidence at this time, warrants full pt interview mild

## 2019-10-15 NOTE — CONSULT NOTE ADULT - ASSESSMENT
89 F w/ hx of DM2, Breast cancer, gastric ulcer, afib not on AC due to falls risk, HTN, HLD p/w worsening weakness and anemia. Pt was recently admitted for GIB roughly 3 weeks ago and upon endoscopy was found to have gastritis and duodenal ulcer.  Leukocytosis, no fever  Has weakness  UA was negative, then became positive; UCX contam  BCX NGTD  Rising WBC during hospital stay--unclear significance  CT a/P negative, but evidence metastatic disease  Has PCN allergy, but noted to have had Cefazolin the past and tolerated  Treat empirically for UTI for now; consider possible apiration  Overall, UTI, leukocytosis, weakness  - Cefepime 1g q 24 (monitor on first dose--hold if signs allergy)  - Check BCX x2   - DC Levaquin/Flagyl  - Would still evaluate/monitor for other causes possible leukocytosis as uncertain this is truly UTI  - Trend WBC    Denis Peng MD  Pager [phone]  After 5pm and on weekends call [phone]

## 2019-10-15 NOTE — PROGRESS NOTE ADULT - ASSESSMENT
89 yr old female with hx breast cancer ,DM2, A-fib, not on AC , recent herpes zoster that she was taking NSAIDS for, had recent endoscopy  for anemia , procedure was noted for  hiatus hernia with superficial ulcerations and erosions in the stomach and duodenum. She  received transfusions for the anemia.  Hgb/ Hct essentially stable over past 2 days with no report of active GI blood loss She was found to be H. pylori +, no treatment per family . Labs noted for leukocytosis ,treated for UTI.  She was alert and appropriately responsive during assessment stating to me that the CT scan showed some spots, that doctors are going to further assess.  CT to further evaluate abdominal distention did not report any bowel distention or ascites,  It reported lytic osseous lesions suspicious for metastatic disease.  Mild bilateral hydronephrosis and a cluster of hypodense lesions in the right hepatic lobe is indeterminate.       Recommendations:    Monitor labs and trends as ordered   Continue PPI BID   Monitor bowel movements on Miralax, Colace and Senna     Beatriz Gamboa, [facility]-BC  Coverage for [facility]  [phone]    Call [phone] after hours 89 yr old female with hx breast cancer ,DM2, A-fib, not on AC , recent herpes zoster that she was taking NSAIDS for, had recent endoscopy for anemia on September 18th noted for hiatal hernia, gastritis with superficial ulcerations and erosions in the stomach and duodenum. She  received transfusions for the anemia.   She was found to be H. pylori +, no treatment per family .Hgb/ Hct essentially stable over past 2 days with no report of active GI blood loss. her labs noted for leukocytosis, ,UTI, treated with antibiotics .  She was alert and appropriately responsive during assessment today stating to me that the CT scan she had showed some" spots" , that the doctors are going to further assess.  CT dated 10/14 to further evaluate abdominal distention did not report any bowel distention or ascites,  It reported lytic osseous lesions suspicious for metastatic disease., mild bilateral hydronephrosis and a cluster of hypodense lesions in the right hepatic lobe is indeterminate.       Recommendations:    Monitor labs and trends as ordered   Continue PPI BID   Duration of antibiotics deferred to primary team   Monitor bowel movements on Miralax, Colace and Senna   Defer CT findings of lytic osseous lesions and hypodense lesions in right hepatic lobe to primary team to further access.      Beatriz Gamboa, ANP-BC  Coverage for [facility]  [phone]    Call [phone] after hours

## 2019-10-15 NOTE — PROGRESS NOTE ADULT - SUBJECTIVE AND OBJECTIVE BOX
Subjective: Patient states abdominal distention is about the same had a small bowel movement yesterday no nausea, no vomiting minimal abdominal discomfort.  Appetite is a little bit better.  No more confusion or lethargy. CT scan of the abdomen performed yesterday patient was continued on IV Levaquin and Flagyl was added    Exam:  Gen: AA And oriented x4 NAD  Neck: no JVD  Chest: normal shape and expansion  Heart: RRR with 2/6 systolic murmur  Lungs: CTAB  Abdomen: Soft, Mild to moderate distension tympanitic minimal tenderness no rebound, no guarding, no rigidity, no HSM, normoactive BS x 4  Ext: no CCE  Skin: Healing lesions on left torso from shingles        Vital Signs Last 24 Hrs  T(C): 36.6 (15 Oct 2019 10:30), Max: 37.4 (14 Oct 2019 23:23)  T(F): 97.9 (15 Oct 2019 10:30), Max: 99.3 (14 Oct 2019 23:23)  HR: 94 (15 Oct 2019 10:30) (86 - 103)  BP: 172/70 (15 Oct 2019 10:30) (167/73 - 182/76)  BP(mean): --  RR: 18 (15 Oct 2019 10:30) (18 - 18)  SpO2: 95% (15 Oct 2019 10:30) (94% - 97%)    Daily     Daily     I&O's Detail    14 Oct 2019 07:01  -  15 Oct 2019 07:00  --------------------------------------------------------  IN:    IV PiggyBack: 250 mL    Oral Fluid: 360 mL    Sodium Chloride 0.9% IV Bolus: 500 mL    sodium chloride 0.9%.: 1058 mL  Total IN: 2168 mL    OUT:    Voided: 100 mL  Total OUT: 100 mL    Total NET: 2068 mL      15 Oct 2019 07:01  -  15 Oct 2019 11:06  --------------------------------------------------------  IN:    Oral Fluid: 350 mL  Total IN: 350 mL    OUT:  Total OUT: 0 mL    Total NET: 350 mL          Daily     CAPILLARY BLOOD GLUCOSE      POCT Blood Glucose.: 165 mg/dL (15 Oct 2019 08:00)  POCT Blood Glucose.: 144 mg/dL (14 Oct 2019 21:29)  POCT Blood Glucose.: 152 mg/dL (14 Oct 2019 12:13)      MEDICATIONS  (STANDING):  amLODIPine   Tablet 2.5 milliGRAM(s) Oral daily  chlorhexidine 2% Cloths 1 Application(s) Topical daily  docusate sodium 100 milliGRAM(s) Oral daily  levoFLOXacin IVPB      levoFLOXacin IVPB 250 milliGRAM(s) IV Intermittent every 24 hours  metoprolol succinate ER 25 milliGRAM(s) Oral daily  metroNIDAZOLE  IVPB 500 milliGRAM(s) IV Intermittent every 8 hours  metroNIDAZOLE  IVPB      nortriptyline 25 milliGRAM(s) Oral daily  pantoprazole    Tablet 40 milliGRAM(s) Oral two times a day  polyethylene glycol 3350 17 Gram(s) Oral daily  saline laxative (FLEET) Rectal Enema 1 Enema Rectal once  senna 2 Tablet(s) Oral at bedtime  sodium chloride 0.9%. 1000 milliLiter(s) (75 mL/Hr) IV Continuous <Continuous>    MEDICATIONS  (PRN):  acetaminophen    Suspension .. 650 milliGRAM(s) Oral every 4 hours PRN Temp greater or equal to 38C (100.4F), Moderate Pain (4 - 6)  ondansetron Injectable 4 milliGRAM(s) IV Push every 6 hours PRN Nausea and/or Vomiting      Labs:                          9.6    21.04 )-----------( 625      ( 15 Oct 2019 09:12 )             30.5       10-15    138  |  98  |  39<H>  ----------------------------<  148<H>  3.6   |  22  |  1.14    Ca    10.7<H>      15 Oct 2019 07:15    TPro  6.8  /  Alb  2.5<L>  /  TBili  0.4  /  DBili  x   /  AST  39  /  ALT  42  /  AlkPhos  171<H>  10-15        Urinalysis Basic - ( 13 Oct 2019 13:48 )    Color: Yellow / Appearance: Turbid / S.042 / pH: x  Gluc: x / Ketone: Small  / Bili: Negative / Urobili: <2 mg/dL   Blood: x / Protein: 100 mg/dL / Nitrite: Positive   Leuk Esterase: Moderate / RBC: 20 /HPF / WBC 58 /HPF   Sq Epi: x / Non Sq Epi: 21 /HPF / Bacteria: TNTC        n/m84-57-88 @ 21:57  .Urine    >=3 organisms. Probable collection contamination.  n/a  n/a  n/a  n/a  n/a    5FC: n/a  AMK: n/a  AMB:  n/a  AMP: n/a  A/S: n/a  AND: n/a  AZM: n/a  AZT: n/a  GORDY: n/a  CFZ: n/a  CFP: n/a  : n/a  CFX: n/a  CTZ: n/a  C/A: n/a  CTX: n/a  CFU: n/a  CHL: n/a  CIP: n/a  CLI: n/a  DAP: n/a  ERT: n/a  LUCIA: n/a  FLU: n/a  GEN: n/a  IMP: n/a  ITR: n/a  LEV: n/a  BRYANT: n/a  DAV: n/a  DONNA: n/a  MOX: n/a  OXA: n/a  PCN: n/a  P/T: n/a  POS: n/a  RIF: n/a  TCN: n/a  TGC: n/a  TOB: n/a  T/S: n/a  VAN: n/a  VORI: n/a  n/g54-51-34 @ 14:19  .Blood    No growth to date.  n/a  n/a  n/a  n/a  n/a    5FC: n/a  AMK: n/a  AMB:  n/a  AMP: n/a  A/S: n/a  AND: n/a  AZM: n/a  AZT: n/a  GORDY: n/a  CFZ: n/a  CFP: n/a  : n/a  CFX: n/a  CTZ: n/a  C/A: n/a  CTX: n/a  CFU: n/a  CHL: n/a  CIP: n/a  CLI: n/a  DAP: n/a  ERT: n/a  LUCIA: n/a  FLU: n/a  GEN: n/a  IMP: n/a  ITR: n/a  LEV: n/a  BRYANT: n/a  DAV: n/a  DONNA: n/a  MOX: n/a  OXA: n/a  PCN: n/a  P/T: n/a  POS: n/a  RIF: n/a  TCN: n/a  TGC: n/a  TOB: n/a  T/S: n/a  VAN: n/a  VORI: n/a    Blood Gas Venous - Lactate (10.14.19 @ 21:27)    Blood Gas Venous - Lactate: 1.3 mmoL/L    Blood Gas Profile - Venous (10.14.19 @ 21:33)    pH, Venous: 7.46    pCO2, Venous: 35 mmHg    pO2, Venous: 100 mmHg    HCO3, Venous: 25 mmol/L    Base Excess, Venous: 1.7 mmol/L    Oxygen Saturation, Venous: 98 %    Total CO2, Venous: 26 mmol/L    Blood Gas Source Venous: Venous    < from: CT Abdomen and Pelvis w/ IV Cont (10.14.19 @ 20:43) >  EXAM:  CT ABDOMEN AND PELVIS IC                            PROCEDURE DATE:  10/14/2019            INTERPRETATION:  CLINICAL INFORMATION: Abdominal pain.    COMPARISON: 2012.    PROCEDURE:   CT of the Abdomen and Pelvis was performed with intravenous contrast.   Intravenous contrast: 90 ml Omnipaque 350. 10 ml discarded.  Oral contrast: None.  Sagittal and coronal reformats were performed.    FINDINGS:.    LOWER CHEST: Small left pleural effusion. Subcentimeter subpleural left   lower lobe pulmonary nodule.    LIVER: Indeterminant cluster of hypodense lesions in the right hepatic   lobe on series 3 image 25.  BILE DUCTS: Normal caliber.  GALLBLADDER: Within normal limits.  SPLEEN: Within normal limits.   PANCREAS: Within normal limits.  ADRENALS: Within normal limits.   KIDNEYS/URETERS: Mild bilateral hydronephrosis. Cysts.     BLADDER: Within normal limits.   REPRODUCTIVE ORGANS: Within normal limits.    BOWEL: No bowel obstruction.  PERITONEUM: No ascites.   VESSELS:  Within normal limits.  RETROPERITONEUM/LYMPH NODES: No lymphadenopathy.     ABDOMINAL WALL: Within normal limits.  BONES: Scattered lytic osseous lesions.    IMPRESSION: Lytic osseous lesions suspicious for metastatic disease.    Mild bilateral hydronephrosis.    Cluster of hypodense lesions in the right hepatic lobe is indeterminate.                      JENNIFER STOCKTON M.D., ATTENDING RADIOLOGIST  This document has been electronically signed. Oct 15 2019  8:41AM    < end of copied text >

## 2019-10-15 NOTE — DIETITIAN INITIAL EVALUATION ADULT. - PROBLEM SELECTOR PLAN 1
Note hgb drop again. Pt endorses chronically dark stools after starting iron. Given recent admission, highly suspicious for source of anemia  -F/u post transfusion CBC  -GI consult in AM ([place])

## 2019-10-15 NOTE — CONSULT NOTE ADULT - SUBJECTIVE AND OBJECTIVE BOX
"HPI: 89F w/ hx of DM2, Breast cancer, gastric ulcer, afib not on AC due to falls risk, HTN, HLD p/w worsening weakness and anemia. Pt was recently admitted for GIB roughly 3 weeks ago and upon endoscopy was found to have gastritis and duodenal ulcer. This was in setting of increased NSAID use to control pain from shingles exacerbation. Pt received transfusions and was discharged to follow up with GI regarding biopsy results. Son and pt informed of H. pyolri in biopsy. After lengthy family discussion, decision was made not to treat. Son noticed his mother getting weaker over past 1-2 weeks. Asked PMD to perform repeat blood test. Pt was found to have hgb of 7 and sent to ER for further evaluation. Pt denies any chest pain, SOB, palpitations. Endorses black mushy stool after starting iron in the hospital. Endorses waking up at night with sweats for many days. Denies any additional NSAID use.    In ER: Given acetaminophen (12 Oct 2019 02:58)"    Above reviewed. Saw/spoke to patient. Patient initially presenting with weakness. Also with recent episode of GIB. Here, patient noted to have weakness and had leukocytosis as well. Team concern for possibility UTI. Patient states no cough, no sob, no abd pain, no N/V/D. No dysuria, no pyuria. No flank pain. Does localize "sensitivity" to abd, but typically on palpation. No rash. No other new complaints. ID called for further evaluation, ? UTI.    PAST MEDICAL & SURGICAL HISTORY:  Unsteady gait  Osteopenia  Injury of head, subsequent encounter: s/p fall 2 years ago   had bleed into area now ok.  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, estrogen receptor positive, unspecified site of breast: ER/CT positive  HER Negative (per family)  Polymyalgia rheumatica  CAD (coronary artery disease)  Bleeding Duodenal Ulcer: resolved several years ago  Age-Related Hearing Loss: wears hearing aids  Hypercholesteremia  HTN (Hypertension)  Atrial Fibrillation  Closed fracture of patella: left   surgery with pins/screws  4- 5 years ago  S/P colonoscopy: 8 years ago negative  S/P breast biopsy, left  Atherosclerotic Coronary Vascular Disease: coronary stent placement in 2004  Stented Coronary Artery: PCI X 1    Allergies    penicillin (Unknown)    ANTIMICROBIALS:  levoFLOXacin IVPB    levoFLOXacin IVPB 250 every 24 hours  metroNIDAZOLE  IVPB 500 every 8 hours  metroNIDAZOLE  IVPB      OTHER MEDS:  acetaminophen    Suspension .. 650 milliGRAM(s) Oral every 4 hours PRN  amLODIPine   Tablet 2.5 milliGRAM(s) Oral daily  chlorhexidine 2% Cloths 1 Application(s) Topical daily  docusate sodium 100 milliGRAM(s) Oral daily  metoprolol succinate ER 25 milliGRAM(s) Oral daily  nortriptyline 25 milliGRAM(s) Oral daily  ondansetron Injectable 4 milliGRAM(s) IV Push every 6 hours PRN  pantoprazole    Tablet 40 milliGRAM(s) Oral two times a day  polyethylene glycol 3350 17 Gram(s) Oral daily  senna 2 Tablet(s) Oral at bedtime  sodium chloride 0.9%. 1000 milliLiter(s) IV Continuous <Continuous>    SOCIAL HISTORY: No tobacco, no alcohol, no illicit drugs    FAMILY HISTORY:  FH: breast cancer    Drug Dosing Weight  Height (cm): 152.4 (11 Oct 2019 18:19)  Weight (kg): 41.7 (11 Oct 2019 18:19)  BMI (kg/m2): 18 (11 Oct 2019 18:19)  BSA (m2): 1.34 (11 Oct 2019 18:19)    PE:    Vital Signs Last 24 Hrs  T(C): 36.6 (15 Oct 2019 10:30), Max: 37.4 (14 Oct 2019 23:23)  T(F): 97.9 (15 Oct 2019 10:30), Max: 99.3 (14 Oct 2019 23:23)  HR: 94 (15 Oct 2019 10:30) (94 - 103)  BP: 172/70 (15 Oct 2019 10:30) (167/73 - 179/69)  RR: 18 (15 Oct 2019 10:30) (18 - 18)  SpO2: 95% (15 Oct 2019 10:30) (95% - 97%)    Gen: AOx3, NAD, non-toxic, weak, chronically ill appearing  CV: S1+S2 normal, nontachycardic  Resp: Clear bilat, no resp distress, no crackles/wheezes  Abd: Soft, distended, pain to palpation, +BS  Ext: No LE edema, no wounds  : External female urinary cath  IV/Skin: No thrombophlebitis, no rash  Msk: No low back pain, no arthralgias, no joint swelling  Neuro: No sensory deficits, no motor deficits    LABS:                        9.6    21.04 )-----------( 625      ( 15 Oct 2019 09:12 )             30.5     10-15    138  |  98  |  39<H>  ----------------------------<  148<H>  3.6   |  22  |  1.14    Ca    10.7<H>      15 Oct 2019 07:15    TPro  6.8  /  Alb  2.5<L>  /  TBili  0.4  /  DBili  x   /  AST  39  /  ALT  42  /  AlkPhos  171<H>  10-15    MICROBIOLOGY:    .Urine  10-13-19   >=3 organisms. Probable collection contamination.    .Blood  10-13-19   No growth to date.      .Blood  10-12-19   No growth to date.     .Urine  10-12-19   <10,000 CFU/mL Normal Urogenital Mily  --  --    (otherwise reviewed)    RADIOLOGY:    10/11 CXR:    FINDINGS:    The lungs are clear. There is no pleural effusion or pneumothorax. The   cardiac silhouette is unremarkable. No acute osseous abnormality.    IMPRESSION: Clear lungs.      10/14 CT:  IMPRESSION: Lytic osseous lesions suspicious for metastatic disease.    Mild bilateral hydronephrosis.    Cluster of hypodense lesions in the right hepatic lobe is indeterminate.

## 2019-10-16 LAB
-  AMIKACIN: SIGNIFICANT CHANGE UP
-  AMIKACIN: SIGNIFICANT CHANGE UP
-  AMPICILLIN/SULBACTAM: SIGNIFICANT CHANGE UP
-  AMPICILLIN/SULBACTAM: SIGNIFICANT CHANGE UP
-  AMPICILLIN: SIGNIFICANT CHANGE UP
-  AMPICILLIN: SIGNIFICANT CHANGE UP
-  AZTREONAM: SIGNIFICANT CHANGE UP
-  AZTREONAM: SIGNIFICANT CHANGE UP
-  CEFAZOLIN: SIGNIFICANT CHANGE UP
-  CEFAZOLIN: SIGNIFICANT CHANGE UP
-  CEFEPIME: SIGNIFICANT CHANGE UP
-  CEFEPIME: SIGNIFICANT CHANGE UP
-  CEFOXITIN: SIGNIFICANT CHANGE UP
-  CEFOXITIN: SIGNIFICANT CHANGE UP
-  CEFTRIAXONE: SIGNIFICANT CHANGE UP
-  CEFTRIAXONE: SIGNIFICANT CHANGE UP
-  CIPROFLOXACIN: SIGNIFICANT CHANGE UP
-  CIPROFLOXACIN: SIGNIFICANT CHANGE UP
-  GENTAMICIN: SIGNIFICANT CHANGE UP
-  GENTAMICIN: SIGNIFICANT CHANGE UP
-  IMIPENEM: SIGNIFICANT CHANGE UP
-  IMIPENEM: SIGNIFICANT CHANGE UP
-  LEVOFLOXACIN: SIGNIFICANT CHANGE UP
-  LEVOFLOXACIN: SIGNIFICANT CHANGE UP
-  MEROPENEM: SIGNIFICANT CHANGE UP
-  MEROPENEM: SIGNIFICANT CHANGE UP
-  NITROFURANTOIN: SIGNIFICANT CHANGE UP
-  NITROFURANTOIN: SIGNIFICANT CHANGE UP
-  PIPERACILLIN/TAZOBACTAM: SIGNIFICANT CHANGE UP
-  PIPERACILLIN/TAZOBACTAM: SIGNIFICANT CHANGE UP
-  TIGECYCLINE: SIGNIFICANT CHANGE UP
-  TIGECYCLINE: SIGNIFICANT CHANGE UP
-  TOBRAMYCIN: SIGNIFICANT CHANGE UP
-  TOBRAMYCIN: SIGNIFICANT CHANGE UP
-  TRIMETHOPRIM/SULFAMETHOXAZOLE: SIGNIFICANT CHANGE UP
-  TRIMETHOPRIM/SULFAMETHOXAZOLE: SIGNIFICANT CHANGE UP
ANION GAP SERPL CALC-SCNC: 13 MMOL/L — SIGNIFICANT CHANGE UP (ref 5–17)
BLD GP AB SCN SERPL QL: NEGATIVE — SIGNIFICANT CHANGE UP
BUN SERPL-MCNC: 35 MG/DL — HIGH (ref 7–23)
CALCIUM SERPL-MCNC: 9.3 MG/DL — SIGNIFICANT CHANGE UP (ref 8.4–10.5)
CALCIUM SERPL-MCNC: 9.7 MG/DL — SIGNIFICANT CHANGE UP (ref 8.4–10.5)
CHLORIDE SERPL-SCNC: 101 MMOL/L — SIGNIFICANT CHANGE UP (ref 96–108)
CO2 SERPL-SCNC: 24 MMOL/L — SIGNIFICANT CHANGE UP (ref 22–31)
CREAT SERPL-MCNC: 0.99 MG/DL — SIGNIFICANT CHANGE UP (ref 0.5–1.3)
CULTURE RESULTS: SIGNIFICANT CHANGE UP
FERRITIN SERPL-MCNC: 1918 NG/ML — HIGH (ref 15–150)
FOLATE SERPL-MCNC: 4.5 NG/ML — LOW
GLUCOSE BLDC GLUCOMTR-MCNC: 106 MG/DL — HIGH (ref 70–99)
GLUCOSE SERPL-MCNC: 105 MG/DL — HIGH (ref 70–99)
HCT VFR BLD CALC: 21.3 % — LOW (ref 34.5–45)
HCT VFR BLD CALC: 24.2 % — LOW (ref 34.5–45)
HCT VFR BLD CALC: 24.5 % — LOW (ref 34.5–45)
HGB BLD-MCNC: 6.9 G/DL — CRITICAL LOW (ref 11.5–15.5)
HGB BLD-MCNC: 7.7 G/DL — LOW (ref 11.5–15.5)
HGB BLD-MCNC: 8.1 G/DL — LOW (ref 11.5–15.5)
IRON SATN MFR SERPL: 16 % — SIGNIFICANT CHANGE UP (ref 14–50)
IRON SATN MFR SERPL: 19 UG/DL — LOW (ref 30–160)
MCHC RBC-ENTMCNC: 27.2 PG — SIGNIFICANT CHANGE UP (ref 27–34)
MCHC RBC-ENTMCNC: 27.2 PG — SIGNIFICANT CHANGE UP (ref 27–34)
MCHC RBC-ENTMCNC: 27.6 PG — SIGNIFICANT CHANGE UP (ref 27–34)
MCHC RBC-ENTMCNC: 31.8 GM/DL — LOW (ref 32–36)
MCHC RBC-ENTMCNC: 32.4 GM/DL — SIGNIFICANT CHANGE UP (ref 32–36)
MCHC RBC-ENTMCNC: 33.1 GM/DL — SIGNIFICANT CHANGE UP (ref 32–36)
MCV RBC AUTO: 83.3 FL — SIGNIFICANT CHANGE UP (ref 80–100)
MCV RBC AUTO: 83.9 FL — SIGNIFICANT CHANGE UP (ref 80–100)
MCV RBC AUTO: 85.5 FL — SIGNIFICANT CHANGE UP (ref 80–100)
METHOD TYPE: SIGNIFICANT CHANGE UP
METHOD TYPE: SIGNIFICANT CHANGE UP
NRBC # BLD: 0 /100 WBCS — SIGNIFICANT CHANGE UP (ref 0–0)
NRBC # BLD: 0 /100 WBCS — SIGNIFICANT CHANGE UP (ref 0–0)
ORGANISM # SPEC MICROSCOPIC CNT: SIGNIFICANT CHANGE UP
PLATELET # BLD AUTO: 393 K/UL — SIGNIFICANT CHANGE UP (ref 150–400)
PLATELET # BLD AUTO: 442 K/UL — HIGH (ref 150–400)
PLATELET # BLD AUTO: 443 K/UL — HIGH (ref 150–400)
POTASSIUM SERPL-MCNC: 3.3 MMOL/L — LOW (ref 3.5–5.3)
POTASSIUM SERPL-SCNC: 3.3 MMOL/L — LOW (ref 3.5–5.3)
PTH-INTACT FLD-MCNC: 14 PG/ML — LOW (ref 15–65)
RBC # BLD: 2.54 M/UL — LOW (ref 3.8–5.2)
RBC # BLD: 2.83 M/UL — LOW (ref 3.8–5.2)
RBC # BLD: 2.94 M/UL — LOW (ref 3.8–5.2)
RBC # FLD: 14.4 % — SIGNIFICANT CHANGE UP (ref 10.3–14.5)
RBC # FLD: 14.5 % — SIGNIFICANT CHANGE UP (ref 10.3–14.5)
RBC # FLD: 14.6 % — HIGH (ref 10.3–14.5)
RH IG SCN BLD-IMP: POSITIVE — SIGNIFICANT CHANGE UP
SODIUM SERPL-SCNC: 138 MMOL/L — SIGNIFICANT CHANGE UP (ref 135–145)
TIBC SERPL-MCNC: 120 UG/DL — LOW (ref 220–430)
UIBC SERPL-MCNC: 101 UG/DL — LOW (ref 110–370)
VIT B12 SERPL-MCNC: 518 PG/ML — SIGNIFICANT CHANGE UP (ref 232–1245)
WBC # BLD: 12.98 K/UL — HIGH (ref 3.8–10.5)
WBC # BLD: 14.01 K/UL — HIGH (ref 3.8–10.5)
WBC # BLD: 15.93 K/UL — HIGH (ref 3.8–10.5)
WBC # FLD AUTO: 12.98 K/UL — HIGH (ref 3.8–10.5)
WBC # FLD AUTO: 14.01 K/UL — HIGH (ref 3.8–10.5)
WBC # FLD AUTO: 15.93 K/UL — HIGH (ref 3.8–10.5)

## 2019-10-16 PROCEDURE — 51702 INSERT TEMP BLADDER CATH: CPT

## 2019-10-16 PROCEDURE — 78320: CPT | Mod: 26

## 2019-10-16 PROCEDURE — 78306 BONE IMAGING WHOLE BODY: CPT | Mod: 26

## 2019-10-16 PROCEDURE — 78999A: CUSTOM | Mod: 26

## 2019-10-16 PROCEDURE — 71250 CT THORAX DX C-: CPT | Mod: 26

## 2019-10-16 PROCEDURE — 99233 SBSQ HOSP IP/OBS HIGH 50: CPT

## 2019-10-16 PROCEDURE — 70551 MRI BRAIN STEM W/O DYE: CPT | Mod: 26

## 2019-10-16 PROCEDURE — 99232 SBSQ HOSP IP/OBS MODERATE 35: CPT

## 2019-10-16 RX ORDER — POTASSIUM CHLORIDE 20 MEQ
40 PACKET (EA) ORAL ONCE
Refills: 0 | Status: DISCONTINUED | OUTPATIENT
Start: 2019-10-16 | End: 2019-10-16

## 2019-10-16 RX ADMIN — Medication 25 MILLIGRAM(S): at 06:21

## 2019-10-16 RX ADMIN — SENNA PLUS 2 TABLET(S): 8.6 TABLET ORAL at 21:53

## 2019-10-16 RX ADMIN — AMLODIPINE BESYLATE 2.5 MILLIGRAM(S): 2.5 TABLET ORAL at 06:21

## 2019-10-16 RX ADMIN — Medication 100 MILLIGRAM(S): at 18:55

## 2019-10-16 RX ADMIN — CHLORHEXIDINE GLUCONATE 1 APPLICATION(S): 213 SOLUTION TOPICAL at 16:26

## 2019-10-16 RX ADMIN — PANTOPRAZOLE SODIUM 40 MILLIGRAM(S): 20 TABLET, DELAYED RELEASE ORAL at 06:22

## 2019-10-16 RX ADMIN — PANTOPRAZOLE SODIUM 40 MILLIGRAM(S): 20 TABLET, DELAYED RELEASE ORAL at 18:55

## 2019-10-16 RX ADMIN — POLYETHYLENE GLYCOL 3350 17 GRAM(S): 17 POWDER, FOR SOLUTION ORAL at 18:55

## 2019-10-16 RX ADMIN — NORTRIPTYLINE HYDROCHLORIDE 25 MILLIGRAM(S): 10 CAPSULE ORAL at 21:53

## 2019-10-16 RX ADMIN — CEFEPIME 100 MILLIGRAM(S): 1 INJECTION, POWDER, FOR SOLUTION INTRAMUSCULAR; INTRAVENOUS at 18:53

## 2019-10-16 NOTE — PROGRESS NOTE ADULT - ASSESSMENT
89 yr old female with hx breast cancer ,recent herpes zoster that she was taking NSAIDS for. Labs noted for anemia , she had recent endoscopy for anemia on September 18th noted for hiatal hernia, gastritis with superficial ulcerations and erosions in the stomach and duodenum. She was found to be H. pylori +, no treatment per family request . There has been no  report of active GI blood loss. treated for ,UTI.  A CT dated 10/14 to further evaluate abdominal distention did not report any bowel distention or ascites, but it did reported lytic osseous lesions suspicious for metastatic disease. Patient had bone scan that reported metastatic disease to the mid and lower thoracic spine,  left lower  rib / pelvis and prox right femur        Recommendations:    Monitor labs and trends as ordered   Continue PPI BID   Duration of antibiotics deferred to primary team   Monitor bowel movements on Miralax, Colace and Senna   Defer bone scan findings to primary team for discussion with patient and family       TINO Rangel  Coverage for [facility]  [phone]    after hours call [phone]

## 2019-10-16 NOTE — PROGRESS NOTE ADULT - ATTENDING COMMENTS
11. Hypokalemia replace potassium  12.  Leukocytosis has been stable may be related to Malignancy and or her stress reaction from anemia/GI bleed continue antibiotics for now appreciate ID input  13. Right middle lobe mucoid impaction no treatment needed at this time  14.Urinary retention may be due to Pamelor versus UTI versus possible cord lesion [place] in place continue Pamelor for now may consider imaging of spine    Gamal Martinez MD, FAAP  office [phone]  cell [phone]

## 2019-10-16 NOTE — PROGRESS NOTE ADULT - SUBJECTIVE AND OBJECTIVE BOX
Subjective: Menon placed for urinary retention overnight CT of chest MRI of the brain and bone scan ordered.  No fever appetite is okay but not great    Exam:  Gen: AA And oriented x4 NAD  Neck: no JVD  Chest: normal shape and expansion  Heart: RRR with 2/6 systolic murmur  Lungs: CTAB  Abdomen: Soft, Significantly less distension tympanitic No tenderness no rebound, no guarding, no rigidity, no HSM, normoactive BS x 4  Ext: no CCE  Skin: Healing lesions on left torso from shingles          Vital Signs Last 24 Hrs  T(C): 36.7 (16 Oct 2019 10:04), Max: 36.7 (15 Oct 2019 16:16)  T(F): 98 (16 Oct 2019 10:04), Max: 98.1 (15 Oct 2019 16:16)  HR: 89 (16 Oct 2019 10:04) (86 - 90)  BP: 148/72 (16 Oct 2019 10:04) (148/72 - 169/69)  BP(mean): --  RR: 16 (16 Oct 2019 10:04) (16 - 18)  SpO2: 93% (16 Oct 2019 10:04) (93% - 95%)    Daily     Daily Weight in k.5 (15 Oct 2019 15:13)    I&O's Detail    15 Oct 2019 07:01  -  16 Oct 2019 07:00  --------------------------------------------------------  IN:    IV PiggyBack: 1000 mL    Oral Fluid: 550 mL    Solution: 100 mL    Solution: 50 mL  Total IN: 1700 mL    OUT:    Indwelling Catheter - Urethral: 2500 mL  Total OUT: 2500 mL    Total NET: -800 mL          Daily 41.5 (10-15 @ 15:13)    CAPILLARY BLOOD GLUCOSE      POCT Blood Glucose.: 106 mg/dL (16 Oct 2019 14:37)  POCT Blood Glucose.: 134 mg/dL (15 Oct 2019 21:40)      MEDICATIONS  (STANDING):  amLODIPine   Tablet 2.5 milliGRAM(s) Oral daily  cefepime   IVPB 1000 milliGRAM(s) IV Intermittent every 24 hours  chlorhexidine 2% Cloths 1 Application(s) Topical daily  docusate sodium 100 milliGRAM(s) Oral daily  metoprolol succinate ER 25 milliGRAM(s) Oral daily  nortriptyline 25 milliGRAM(s) Oral daily  pantoprazole    Tablet 40 milliGRAM(s) Oral two times a day  polyethylene glycol 3350 17 Gram(s) Oral daily  potassium chloride    Tablet ER 40 milliEquivalent(s) Oral once  senna 2 Tablet(s) Oral at bedtime  sodium chloride 0.9%. 1000 milliLiter(s) (75 mL/Hr) IV Continuous <Continuous>    MEDICATIONS  (PRN):  acetaminophen    Suspension .. 650 milliGRAM(s) Oral every 4 hours PRN Temp greater or equal to 38C (100.4F), Moderate Pain (4 - 6)  ondansetron Injectable 4 milliGRAM(s) IV Push every 6 hours PRN Nausea and/or Vomiting      Labs:                                     6.9    12.98 )-----------( 393      ( 16 Oct 2019 11:14 )             21.3   baso x      eos x      imm gran x      lymph x      mono x      poly x                            7.7    15.93 )-----------( 443      ( 16 Oct 2019 08:15 )             24.2   baso x      eos x      imm gran x      lymph x      mono x      poly x                            9.6    21.04 )-----------( 625      ( 15 Oct 2019 09:12 )             30.5   baso 0.2    eos 0.0    imm gran 1.5    lymph 4.2    mono 7.3    poly 86.8                         9.4    21.74 )-----------( 640      ( 14 Oct 2019 21:41 )             29.7   baso 0.1    eos 0.0    imm gran 1.1    lymph 3.6    mono 5.2    poly 90.0                         9.3    22.77 )-----------( 674      ( 14 Oct 2019 08:26 )             29.6   baso x      eos x      imm gran x      lymph x      mono x      poly x        10-16    138  |  101  |  35<H>  ----------------------------<  105<H>  3.3<L>   |  24  |  0.99    Ca    9.7      16 Oct 2019 06:13    TPro  6.8  /  Alb  2.5<L>  /  TBili  0.4  /  DBili  x   /  AST  39  /  ALT  42  /  AlkPhos  171<H>  10-15            n/[id number] @ 21:57  .Urine    >100,000 CFU/ml Gram Negative Rods  Culture in progress  >=3 organisms. Probable collection contamination.  n/a  n/a  n/a  n/a  n/a    5FC: n/a  AMK: n/a  AMB:  n/a  AMP: n/a  A/S: n/a  AND: n/a  AZM: n/a  AZT: n/a  GORDY: n/a  CFZ: n/a  CFP: n/a  : n/a  CFX: n/a  CTZ: n/a  C/A: n/a  CTX: n/a  CFU: n/a  CHL: n/a  CIP: n/a  CLI: n/a  DAP: n/a  ERT: n/a  LUCIA: n/a  FLU: n/a  GEN: n/a  IMP: n/a  ITR: n/a  LEV: n/a  BRYANT: n/a  DAV: n/a  DONNA: n/a  MOX: n/a  OXA: n/a  PCN: n/a  P/T: n/a  POS: n/a  RIF: n/a  TCN: n/a  TGC: n/a  TOB: n/a  T/S: n/a  VAN: n/a  VORI: n/a    < from: CT Chest No Cont (10.16.19 @ 11:17) >  EXAM:  CT CHEST                            PROCEDURE DATE:  10/16/2019            INTERPRETATION:  Clinical information: Breast cancer.    CT scan of the chest was obtained without administration of intravenous   contrast.    An approximately 4.3 cm low-attenuation lesion is noted within the right   lobe of the thyroid gland.    Few small lymph nodes are present in the pretracheal space and the AP   window.     Heart is enlarged in size. Calcification of the aortic valve and the   coronary arteries is noted. No pericardial effusion is noted.     No endobronchial lesions are noted. Few branching tubular opacities   representing mucoid impacted distal small airways are noted in the right   middle lobe. Small bilateral pleural effusions are noted.    Below the diaphragm, visualized portions of the abdomen demonstrate   contrast within the visualized portion of the left renal cortex.     Lytic lesions are noted involving few of the ribs bilaterally.    Impression: Findings suggestive of bony metastasis.    Small bilateral pleural effusions.    There is a 4.3 cm low-attenuation lesion in the right lobe of the thyroid   gland. Further evaluation with ultrasound is recommended.                    JESÚS MOONEY M.D., ATTENDING RADIOLOGIST  This document has been electronically signed. Oct 16 2019 12:12PM    < end of copied text >      < from: CT Abdomen and Pelvis w/ IV Cont (10.14.19 @ 20:43) >  EXAM:  CT ABDOMEN AND PELVIS IC                            PROCEDURE DATE:  10/14/2019            INTERPRETATION:  CLINICAL INFORMATION: Abdominal pain.    COMPARISON: 2012.    PROCEDURE:   CT of the Abdomen and Pelvis was performed with intravenous contrast.   Intravenous contrast: 90 ml Omnipaque 350. 10 ml discarded.  Oral contrast: None.  Sagittal and coronal reformats were performed.    FINDINGS:.    LOWER CHEST: Small left pleural effusion. Subcentimeter subpleural left   lower lobe pulmonary nodule.    LIVER: Indeterminant cluster of hypodense lesions in the right hepatic   lobe on series 3 image 25.  BILE DUCTS: Normal caliber.  GALLBLADDER: Within normal limits.  SPLEEN: Within normal limits.   PANCREAS: Within normal limits.  ADRENALS: Within normal limits.   KIDNEYS/URETERS: Mild bilateral hydronephrosis. Cysts.     BLADDER: Within normal limits.   REPRODUCTIVE ORGANS: Within normal limits.    BOWEL: No bowel obstruction.  PERITONEUM: No ascites.   VESSELS:  Within normal limits.  RETROPERITONEUM/LYMPH NODES: No lymphadenopathy.     ABDOMINAL WALL: Within normal limits.  BONES: Scattered lytic osseous lesions.    IMPRESSION: Lytic osseous lesions suspicious for metastatic disease.    Mild bilateral hydronephrosis.    Cluster of hypodense lesions in the right hepatic lobe is indeterminate.                      JENNIFER STOCKTON M.D., ATTENDING RADIOLOGIST  This document has been electronically signed. Oct 15 2019  8:41AM    < end of copied text >

## 2019-10-16 NOTE — PROGRESS NOTE ADULT - ASSESSMENT
89 F w/ hx of DM2, Breast cancer, gastric ulcer, afib not on AC due to falls risk, HTN, HLD p/w worsening weakness and anemia. Pt was recently admitted for GIB roughly 3 weeks ago and upon endoscopy was found to have gastritis and duodenal ulcer.  Leukocytosis, no fever  UA was negative, then became positive; UCX contam (GNR pending)  BCX NGTD  Rising WBC during hospital stay--unclear significance  CT A/P negative, but evidence metastatic disease  Has PCN allergy, tolerating Cefepime  Treat empirically for UTI for now; consider possible aspiration  Overall, UTI, leukocytosis, weakness  - Cefepime 1g q 24  - F/U pending cultures  - Trend WBC, monitor for further signs infection/sepsis    Denis Pneg MD  Pager [phone]  After 5pm and on weekends call [phone]

## 2019-10-16 NOTE — PROVIDER CONTACT NOTE (CRITICAL VALUE NOTIFICATION) - ACTION/TREATMENT ORDERED:
PATRICIA Saucedo made aware, Will continue to monitor and treat as ordered.
digoxin discontinued.  continue to monitor.

## 2019-10-16 NOTE — PROCEDURE NOTE - NSURITECHNIQUE_GU_A_CORE
The site was cleaned with soap/water and sterile solution (betadine)/The catheter was secured with a securement device (e.g. StatLock)/The urinary drainage system is closed at the end of the procedure/All applicable medical record documentation is completed/The collection bag is below the level of the patient and urinary bladder/Proper hand hygiene was performed/A sterile drape was used to cover all adjacent areas/Sterile gloves were worn for the duration of the procedure/The catheter was appropriately lubricated

## 2019-10-16 NOTE — PROGRESS NOTE ADULT - SUBJECTIVE AND OBJECTIVE BOX
INTERVAL HPI/OVERNIGHT EVENTS:  Patient awake and responsive, reports she only had a small bowel movement, no dark stools reported by staff     MEDICATIONS  (STANDING):  amLODIPine   Tablet 2.5 milliGRAM(s) Oral daily  cefepime   IVPB 1000 milliGRAM(s) IV Intermittent every 24 hours  chlorhexidine 2% Cloths 1 Application(s) Topical daily  docusate sodium 100 milliGRAM(s) Oral daily  metoprolol succinate ER 25 milliGRAM(s) Oral daily  nortriptyline 25 milliGRAM(s) Oral daily  pantoprazole    Tablet 40 milliGRAM(s) Oral two times a day  polyethylene glycol 3350 17 Gram(s) Oral daily  potassium chloride    Tablet ER 40 milliEquivalent(s) Oral once  senna 2 Tablet(s) Oral at bedtime  sodium chloride 0.9%. 1000 milliLiter(s) (75 mL/Hr) IV Continuous <Continuous>    MEDICATIONS  (PRN):  acetaminophen    Suspension .. 650 milliGRAM(s) Oral every 4 hours PRN Temp greater or equal to 38C (100.4F), Moderate Pain (4 - 6)  ondansetron Injectable 4 milliGRAM(s) IV Push every 6 hours PRN Nausea and/or Vomiting      Allergies    penicillin (Unknown)    Intolerances        Review of Systems:    General:  No fever or chills    ENT:  No sore throat,or dysphagia, not eating much she reports   CV:  No chest  pain  Resp:  No dyspnea, or cough  GI: no nausea or vomiting , had small BM per staff no dark stools         Vital Signs Last 24 Hrs  T(C): 36.7 (16 Oct 2019 10:04), Max: 36.7 (15 Oct 2019 16:16)  T(F): 98 (16 Oct 2019 10:04), Max: 98.1 (15 Oct 2019 16:16)  HR: 89 (16 Oct 2019 10:04) (86 - 90)  BP: 148/72 (16 Oct 2019 10:04) (148/72 - 169/69)  BP(mean): --  RR: 16 (16 Oct 2019 10:04) (16 - 18)  SpO2: 93% (16 Oct 2019 10:04) (93% - 95%)    PHYSICAL EXAM:    Constitutional: frail , non toxic and in NAD  Neck:  supple  Respiratory: anterior chest wall clear to auscultation with no  wheezing  Cardiovascular: S1 and S2, RRR  Gastrointestinal: mild distended girth , + hypoactive bowel sounds ,  Extremities: No peripheral edema or yanosis  Psychiatric: Normal mood, normal affect  Skin: No visible rashes      LABS:                        8.1    14.01 )-----------( 442      ( 16 Oct 2019 15:19 )             24.5     10-16      138  |  101  |  35<H>  ----------------------------<  105<H>  3.3<L>   |  24  |  0.99    Ca    9.7      16 Oct 2019 06:13    TPro  6.8  /  Alb  2.5<L>  /  TBili  0.4  /  DBili  x   /  AST  39  /  ALT  42  /  AlkPhos  171<H>  10-15        LIVER FUNCTIONS - ( 15 Oct 2019 07:15 )  Alb: 2.5 g/dL / Pro: 6.8 g/dL / ALK PHOS: 171 U/L / ALT: 42 U/L / AST: 39 U/L / GGT: x             RADIOLOGY & ADDITIONAL TESTS:  < from: CT Abdomen and Pelvis w/ IV Cont (10.14.19 @ 20:43) >  FINDINGS:.    LOWER CHEST: Small left pleural effusion. Subcentimeter subpleural left   lower lobe pulmonary nodule.    LIVER: Indeterminant cluster of hypodense lesions in the right hepatic   lobe on series 3 image 25.  BILE DUCTS: Normal caliber.  GALLBLADDER: Within normal limits.  SPLEEN: Within normal limits.   PANCREAS: Within normal limits.  ADRENALS: Within normal limits.   KIDNEYS/URETERS: Mild bilateral hydronephrosis. Cysts.     BLADDER: Within normal limits.   REPRODUCTIVE ORGANS: Within normal limits.    BOWEL: No bowel obstruction.  PERITONEUM: No ascites.   VESSELS:  Within normal limits.  RETROPERITONEUM/LYMPH NODES: No lymphadenopathy.     ABDOMINAL WALL: Within normal limits.  BONES: Scattered lytic osseous lesions.    IMPRESSION: Lytic osseous lesions suspicious for metastatic disease.    Mild bilateral hydronephrosis.    Cluster of hypodense lesions in the right hepatic lobe is indeterminate.      < end of copied text >  < from: CT Chest No Cont (10.16.19 @ 11:17) >  INTERPRETATION:  Clinical information: Breast cancer.    CT scan of the chest was obtained without administration of intravenous   contrast.    An approximately 4.3 cm low-attenuation lesion is noted within the right   lobe of the thyroid gland.    Few small lymph nodes are present in the pretracheal space and the AP   window.     Heart is enlarged in size. Calcification of the aortic valve and the   coronary arteries is noted. No pericardial effusion is noted.     No endobronchial lesions are noted. Few branching tubular opacities   representing mucoid impacted distal small airways are noted in the right   middle lobe. Small bilateral pleural effusions are noted.    Below the diaphragm, visualized portions of the abdomen demonstrate   contrast within the visualized portion of the left renal cortex.     Lytic lesions are noted involving few of the ribs bilaterally.    Impression: Findings suggestive of bony metastasis.    Small bilateral pleural effusions.    There is a 4.3 cm low-attenuation lesion in the right lobe of the thyroid   gland. Further evaluation with ultrasound is recommended.    < end of copied text >      < from: NM Bone Imaging Total (10.16.19 @ 13:31) >  FINDINGS: There are multiple foci of abnormal radiopharmaceutical   accumulation in the mid and lower thoracic spine, the right iliac bone   just medial to the sacrum, inferolateral aspect of the right iliac bone   just above the acetabulum, left lower iliac bone, lesser trochanter and   proximal shaft of the right femur.On the CT component of the examination   there is no evidence of cortical disruption involving the right femoral   abnormalities.    There is an area of decreased radiopharmaceutical uptake involving a left   lower rib.    Both kidneys are visualizedand are symmetric in appearance.    IMPRESSION: Abnormal bone scan. Metastatic disease involving the mid and   lower thoracic spine, left lower rib, the pelvis, and proximal right   femur.          < end of copied text >

## 2019-10-16 NOTE — PROGRESS NOTE ADULT - SUBJECTIVE AND OBJECTIVE BOX
CC: F/U for UTI    Saw/spoke to patient. No fevers, no chills. No new complaints. More awake today.    Allergies  penicillin (Unknown)    ANTIMICROBIALS:  cefepime   IVPB 1000 every 24 hours    PE:    Vital Signs Last 24 Hrs  T(C): 36.7 (16 Oct 2019 10:04), Max: 36.7 (15 Oct 2019 16:16)  T(F): 98 (16 Oct 2019 10:04), Max: 98.1 (15 Oct 2019 16:16)  HR: 89 (16 Oct 2019 10:04) (86 - 90)  BP: 148/72 (16 Oct 2019 10:04) (148/72 - 169/69)  RR: 16 (16 Oct 2019 10:04) (16 - 18)  SpO2: 93% (16 Oct 2019 10:04) (93% - 95%)    Gen: AOx1-2, NAD, improved  CV: S1+S2 normal, nontachycardic  Resp: Clear bilat, no resp distress, no crackles/wheezes  Abd: Soft, nontender, +BS  Ext: No LE edema, no wounds    LABS:                        7.7    15.93 )-----------( 443      ( 16 Oct 2019 08:15 )             24.2     10-16    138  |  101  |  35<H>  ----------------------------<  105<H>  3.3<L>   |  24  |  0.99    Ca    9.7      16 Oct 2019 06:13    TPro  6.8  /  Alb  2.5<L>  /  TBili  0.4  /  DBili  x   /  AST  39  /  ALT  42  /  AlkPhos  171<H>  10-15    MICROBIOLOGY:    .Urine  10-13-19   >100,000 CFU/ml Gram Negative Rods  Culture in progress  >=3 organisms. Probable collection contamination.     .Urine  10-12-19   <10,000 CFU/mL Normal Urogenital Mily  --  --    (otherwise reviewed)    RADIOLOGY:    10/14 CT:    IMPRESSION: Lytic osseous lesions suspicious for metastatic disease.    Mild bilateral hydronephrosis.    Cluster of hypodense lesions in the right hepatic lobe is indeterminate.

## 2019-10-17 LAB
ANION GAP SERPL CALC-SCNC: 12 MMOL/L — SIGNIFICANT CHANGE UP (ref 5–17)
BUN SERPL-MCNC: 21 MG/DL — SIGNIFICANT CHANGE UP (ref 7–23)
CALCIUM SERPL-MCNC: 9.5 MG/DL — SIGNIFICANT CHANGE UP (ref 8.4–10.5)
CHLORIDE SERPL-SCNC: 98 MMOL/L — SIGNIFICANT CHANGE UP (ref 96–108)
CO2 SERPL-SCNC: 27 MMOL/L — SIGNIFICANT CHANGE UP (ref 22–31)
CREAT SERPL-MCNC: 0.48 MG/DL — LOW (ref 0.5–1.3)
CULTURE RESULTS: SIGNIFICANT CHANGE UP
GLUCOSE BLDC GLUCOMTR-MCNC: 135 MG/DL — HIGH (ref 70–99)
GLUCOSE SERPL-MCNC: 142 MG/DL — HIGH (ref 70–99)
HCT VFR BLD CALC: 32.6 % — LOW (ref 34.5–45)
HGB BLD-MCNC: 10.9 G/DL — LOW (ref 11.5–15.5)
MCHC RBC-ENTMCNC: 28.2 PG — SIGNIFICANT CHANGE UP (ref 27–34)
MCHC RBC-ENTMCNC: 33.4 GM/DL — SIGNIFICANT CHANGE UP (ref 32–36)
MCV RBC AUTO: 84.2 FL — SIGNIFICANT CHANGE UP (ref 80–100)
NRBC # BLD: 0 /100 WBCS — SIGNIFICANT CHANGE UP (ref 0–0)
PLATELET # BLD AUTO: 425 K/UL — HIGH (ref 150–400)
POTASSIUM SERPL-MCNC: 3.5 MMOL/L — SIGNIFICANT CHANGE UP (ref 3.5–5.3)
POTASSIUM SERPL-SCNC: 3.5 MMOL/L — SIGNIFICANT CHANGE UP (ref 3.5–5.3)
RBC # BLD: 3.87 M/UL — SIGNIFICANT CHANGE UP (ref 3.8–5.2)
RBC # FLD: 14.3 % — SIGNIFICANT CHANGE UP (ref 10.3–14.5)
SODIUM SERPL-SCNC: 137 MMOL/L — SIGNIFICANT CHANGE UP (ref 135–145)
SPECIMEN SOURCE: SIGNIFICANT CHANGE UP
WBC # BLD: 14.78 K/UL — HIGH (ref 3.8–10.5)
WBC # FLD AUTO: 14.78 K/UL — HIGH (ref 3.8–10.5)

## 2019-10-17 PROCEDURE — 99232 SBSQ HOSP IP/OBS MODERATE 35: CPT

## 2019-10-17 RX ORDER — FOLIC ACID 0.8 MG
1 TABLET ORAL DAILY
Refills: 0 | Status: DISCONTINUED | OUTPATIENT
Start: 2019-10-17 | End: 2019-11-04

## 2019-10-17 RX ORDER — CEFTRIAXONE 500 MG/1
1000 INJECTION, POWDER, FOR SOLUTION INTRAMUSCULAR; INTRAVENOUS EVERY 24 HOURS
Refills: 0 | Status: DISCONTINUED | OUTPATIENT
Start: 2019-10-17 | End: 2019-10-22

## 2019-10-17 RX ORDER — POTASSIUM CHLORIDE 20 MEQ
10 PACKET (EA) ORAL
Refills: 0 | Status: COMPLETED | OUTPATIENT
Start: 2019-10-17 | End: 2019-10-17

## 2019-10-17 RX ADMIN — Medication 100 MILLIEQUIVALENT(S): at 08:24

## 2019-10-17 RX ADMIN — PANTOPRAZOLE SODIUM 40 MILLIGRAM(S): 20 TABLET, DELAYED RELEASE ORAL at 05:03

## 2019-10-17 RX ADMIN — Medication 100 MILLIEQUIVALENT(S): at 04:54

## 2019-10-17 RX ADMIN — SENNA PLUS 2 TABLET(S): 8.6 TABLET ORAL at 22:19

## 2019-10-17 RX ADMIN — Medication 100 MILLIGRAM(S): at 11:01

## 2019-10-17 RX ADMIN — NORTRIPTYLINE HYDROCHLORIDE 25 MILLIGRAM(S): 10 CAPSULE ORAL at 22:19

## 2019-10-17 RX ADMIN — SODIUM CHLORIDE 75 MILLILITER(S): 9 INJECTION INTRAMUSCULAR; INTRAVENOUS; SUBCUTANEOUS at 11:09

## 2019-10-17 RX ADMIN — CHLORHEXIDINE GLUCONATE 1 APPLICATION(S): 213 SOLUTION TOPICAL at 11:01

## 2019-10-17 RX ADMIN — AMLODIPINE BESYLATE 2.5 MILLIGRAM(S): 2.5 TABLET ORAL at 05:03

## 2019-10-17 RX ADMIN — SODIUM CHLORIDE 75 MILLILITER(S): 9 INJECTION INTRAMUSCULAR; INTRAVENOUS; SUBCUTANEOUS at 04:55

## 2019-10-17 RX ADMIN — CEFTRIAXONE 100 MILLIGRAM(S): 500 INJECTION, POWDER, FOR SOLUTION INTRAMUSCULAR; INTRAVENOUS at 16:50

## 2019-10-17 RX ADMIN — Medication 100 MILLIEQUIVALENT(S): at 07:23

## 2019-10-17 RX ADMIN — POLYETHYLENE GLYCOL 3350 17 GRAM(S): 17 POWDER, FOR SOLUTION ORAL at 11:01

## 2019-10-17 RX ADMIN — Medication 25 MILLIGRAM(S): at 05:03

## 2019-10-17 RX ADMIN — Medication 650 MILLIGRAM(S): at 16:50

## 2019-10-17 NOTE — PROGRESS NOTE ADULT - ASSESSMENT
89 F w/ hx of DM2, Breast cancer, gastric ulcer, afib not on AC due to falls risk, HTN, HLD p/w worsening weakness and anemia. Pt was recently admitted for GIB roughly 3 weeks ago and upon endoscopy was found to have gastritis and duodenal ulcer.  Leukocytosis, no fever  UA was negative, then became positive; UCX contam--subspeciated with multiple type E coli, S CTX  BCX NGTD  Rising WBC during hospital stay--unclear significance  CT A/P negative, but evidence metastatic disease  Has PCN allergy, tolerating Cefepime  Complete treatment for UTI  Overall, UTI, leukocytosis, weakness  - Ceftriaxone 1g q 24  - DC Cefepime  - Trend WBC, monitor for further signs infection/sepsis  - Malignancy workup per primary team    Denis Peng MD  Pager [phone]  After 5pm and on weekends call [phone]

## 2019-10-17 NOTE — PROGRESS NOTE ADULT - SUBJECTIVE AND OBJECTIVE BOX
CC: F/U for UTI    Saw/spoke to patient. Clinically improved. More awake today but still confused. No new complaints.    Allergies  penicillin (Unknown)    ANTIMICROBIALS:  cefepime   IVPB 1000 every 24 hours    PE:    Vital Signs Last 24 Hrs  T(C): 36.7 (17 Oct 2019 07:37), Max: 37.6 (16 Oct 2019 22:08)  T(F): 98.1 (17 Oct 2019 07:37), Max: 99.7 (16 Oct 2019 22:08)  HR: 90 (17 Oct 2019 07:37) (90 - 100)  BP: 155/73 (17 Oct 2019 07:37) (147/64 - 176/67)  RR: 8 (17 Oct 2019 07:37) (8 - 18)  SpO2: 93% (17 Oct 2019 07:37) (92% - 94%)    Gen: AOx1-2, more awake today  CV: S1+S2 normal, nontachycardic  Resp: Clear bilat, no resp distress, no crackles/wheezes  Abd: Soft, nontender, +BS  Ext: No LE edema, no wounds    LABS:                        8.1    14.01 )-----------( 442      ( 16 Oct 2019 15:19 )             24.5     10-16    138  |  101  |  35<H>  ----------------------------<  105<H>  3.3<L>   |  24  |  0.99    Ca    9.7      16 Oct 2019 06:13    MICROBIOLOGY:    .Urine  10-16-19   <10,000 CFU/mL Normal Urogenital Mily    .Blood  10-15-19   No growth to date.    .Urine  10-13-19   >100,000 CFU/ml Escherichia coli  Multiple Morphological Strains  --  Escherichia coli  Escherichia coli    (otherwise reviewed)    RADIOLOGY:    10/16 MR:    IMPRESSION:    No acute intracranial hemorrhage, mass effect, vasogenic edema, or   evidence of acute territorial infarct.    Chronic right temporal infarct.

## 2019-10-17 NOTE — PROGRESS NOTE ADULT - SUBJECTIVE AND OBJECTIVE BOX
Subjective: no new co    Exam:  Gen: AA And oriented x4 NAD  Neck: no JVD  Chest: normal shape and expansion  Heart: RRR with 2/6 systolic murmur  Lungs: CTAB  Abdomen: Soft, Significantly less distension tympanitic No tenderness no rebound, no guarding, no rigidity, no HSM, normoactive BS x 4  Ext: no CCE  Skin: Healing lesions on left torso from shingles            Vital Signs Last 24 Hrs  T(C): 36.7 (17 Oct 2019 07:37), Max: 37.6 (16 Oct 2019 22:08)  T(F): 98.1 (17 Oct 2019 07:37), Max: 99.7 (16 Oct 2019 22:08)  HR: 90 (17 Oct 2019 07:37) (89 - 100)  BP: 155/73 (17 Oct 2019 07:37) (147/64 - 176/67)  BP(mean): --  RR: 8 (17 Oct 2019 07:37) (8 - 18)  SpO2: 93% (17 Oct 2019 07:37) (92% - 94%)    Daily     Daily     I&O's Detail    16 Oct 2019 07:01  -  17 Oct 2019 07:00  --------------------------------------------------------  IN:    IV PiggyBack: 200 mL    Oral Fluid: 50 mL    Packed Red Blood Cells: 350 mL    sodium chloride 0.9%.: 900 mL  Total IN: 1500 mL    OUT:    Indwelling Catheter - Urethral: 500 mL    Voided: 800 mL  Total OUT: 1300 mL    Total NET: 200 mL          Daily 41.5 (10-15 @ 15:13)    CAPILLARY BLOOD GLUCOSE      POCT Blood Glucose.: 106 mg/dL (16 Oct 2019 14:37)      MEDICATIONS  (STANDING):  amLODIPine   Tablet 2.5 milliGRAM(s) Oral daily  cefepime   IVPB 1000 milliGRAM(s) IV Intermittent every 24 hours  chlorhexidine 2% Cloths 1 Application(s) Topical daily  docusate sodium 100 milliGRAM(s) Oral daily  metoprolol succinate ER 25 milliGRAM(s) Oral daily  nortriptyline 25 milliGRAM(s) Oral daily  pantoprazole    Tablet 40 milliGRAM(s) Oral two times a day  polyethylene glycol 3350 17 Gram(s) Oral daily  potassium chloride  10 mEq/100 mL IVPB 10 milliEquivalent(s) IV Intermittent every 1 hour  senna 2 Tablet(s) Oral at bedtime  sodium chloride 0.9%. 1000 milliLiter(s) (75 mL/Hr) IV Continuous <Continuous>    MEDICATIONS  (PRN):  acetaminophen    Suspension .. 650 milliGRAM(s) Oral every 4 hours PRN Temp greater or equal to 38C (100.4F), Moderate Pain (4 - 6)  ondansetron Injectable 4 milliGRAM(s) IV Push every 6 hours PRN Nausea and/or Vomiting      Labs:                           8.1    14.01 )-----------( 442      ( 16 Oct 2019 15:19 )             24.5   baso x      eos x      imm gran x      lymph x      mono x      poly x                            6.9    12.98 )-----------( 393      ( 16 Oct 2019 11:14 )             21.3   baso x      eos x      imm gran x      lymph x      mono x      poly x                            7.7    15.93 )-----------( 443      ( 16 Oct 2019 08:15 )             24.2   baso x      eos x      imm gran x      lymph x      mono x      poly x                            9.6    21.04 )-----------( 625      ( 15 Oct 2019 09:12 )             30.5   baso 0.2    eos 0.0    imm gran 1.5    lymph 4.2    mono 7.3    poly 86.8                         9.4    21.74 )-----------( 640      ( 14 Oct 2019 21:41 )             29.7   baso 0.1    eos 0.0    imm gran 1.1    lymph 3.6    mono 5.2    poly 90.0                         9.3    22.77 )-----------( 674      ( 14 Oct 2019 08:26 )             29.6   baso x      eos x      imm gran x      lymph x      mono x      poly x        10-16 @ 06:13    138  |  101  |  35  ----------------------------<  105  3.3   |  24  |  0.99    10-15 @ 07:15    138  |  98  |  39  ----------------------------<  148  3.6   |  22  |  1.14    10-14 @ 21:41    134  |  96  |  37  ----------------------------<  138  3.8   |  25  |  0.93        TPro  6.8  /  Alb  2.5  /  TBili  0.4  /  DBili  x   /  AST  39  /  ALT  42  /  AlkPhos  171  10-15 @ 07:15  TPro  7.0  /  Alb  2.7  /  TBili  0.4  /  DBili  x   /  AST  47  /  ALT  43  /  AlkPhos  172  10-14 @ 21:41        n/a10-15-19 @ 22:19  .Blood    No growth to date.  n/a  n/a  n/a  n/a  n/a    5FC: n/a  AMK: n/a  AMB:  n/a  AMP: n/a  A/S: n/a  AND: n/a  AZM: n/a  AZT: n/a  GORDY: n/a  CFZ: n/a  CFP: n/a  : n/a  CFX: n/a  CTZ: n/a  C/A: n/a  CTX: n/a  CFU: n/a  CHL: n/a  CIP: n/a  CLI: n/a  DAP: n/a  ERT: n/a  LUCIA: n/a  FLU: n/a  GEN: n/a  IMP: n/a  ITR: n/a  LEV: n/a  BRYANT: n/a  DAV: n/a  DONNA: n/a  MOX: n/a  OXA: n/a  PCN: n/a  P/T: n/a  POS: n/a  RIF: n/a  TCN: n/a  TGC: n/a  TOB: n/a  T/S: n/a  VAN: n/a  VORI: n/a    < from: NM SPECT/CT Bone/Joint (10.16.19 @ 13:31) >  EXAM:  NM BONE IMG WHOLE BODY                          EXAM:  NM BONE SPECT CT#                                PROCEDURE DATE:  10/16/2019          INTERPRETATION:  RADIOPHARMACEUTICAL: 20.5 mCi Tc-99m HDP, I.V.     CLINICAL INFORMATION: 89 year old female with breast carcinoma and   osseous lesions on CT; referred to evaluate for extent of osseous   metastases.    TECHNIQUE:  Anterior and posterior whole body images were obtained   approximately 2 hours following radiopharmaceutical administration.   Static images of the ribs in multiple views and SPECT/CT of the   hips/proximal femurs also were obtained. A computer workstation was used   to obtain composite images for anatomic correlation by precisely   overlying the SPECT and CT images to generate a fused SPECT/CT image. The   CT protocol was optimized for SPECT attenuation correction and to provide   anatomic detail for localization of SPECT abnormalities. The CT protocol   was not designed to produce and cannot replace state-of- the-art   diagnostic CT images with specific imaging protocols for different body   parts and indications.      COMPARISON: No previous bone scans were available for comparison.    FINDINGS: There are multiple foci of abnormal radiopharmaceutical   accumulation in the mid and lower thoracic spine, the right iliac bone   just medial to the sacrum, inferolateral aspect of the right iliac bone   just above the acetabulum, left lower iliac bone, lesser trochanter and   proximal shaft of the right femur.On the CT component of the examination   there is no evidence of cortical disruption involving the right femoral   abnormalities.    There is an area of decreased radiopharmaceutical uptake involving a left   lower rib.    Both kidneys are visualizedand are symmetric in appearance.    IMPRESSION: Abnormal bone scan. Metastatic disease involving the mid and   lower thoracic spine, left lower rib, the pelvis, and proximal right   femur.                      RANCHO CORRIGAN M.D., CHIEF OF NUCLEAR MEDICINE  This document has been electronically signed. Oct 16 2019  3:37PM    < end of copied text >    < from: CT Chest No Cont (10.16.19 @ 11:17) >  EXAM:  CT CHEST                            PROCEDURE DATE:  10/16/2019            INTERPRETATION:  Clinical information: Breast cancer.    CT scan of the chest was obtained without administration of intravenous   contrast.    An approximately 4.3 cm low-attenuation lesion is noted within the right   lobe of the thyroid gland.    Few small lymph nodes are present in the pretracheal space and the AP   window.     Heart is enlarged in size. Calcification of the aortic valve and the   coronary arteries is noted. No pericardial effusion is noted.     No endobronchial lesions are noted. Few branching tubular opacities   representing mucoid impacted distal small airways are noted in the right   middle lobe. Small bilateral pleural effusions are noted.    Below the diaphragm, visualized portions of the abdomen demonstrate   contrast within the visualized portion of the left renal cortex.     Lytic lesions are noted involving few of the ribs bilaterally.    Impression: Findings suggestive of bony metastasis.    Small bilateral pleural effusions.    There is a 4.3 cm low-attenuation lesion in the right lobe of the thyroid   gland. Further evaluation with ultrasound is recommended.                    JESÚS MOONEY M.D., ATTENDING RADIOLOGIST  This document has been electronically signed. Oct 16 2019 12:12PM    < end of copied text >      < from: CT Abdomen and Pelvis w/ IV Cont (10.14.19 @ 20:43) >  EXAM:  CT ABDOMEN AND PELVIS IC                            PROCEDURE DATE:  10/14/2019            INTERPRETATION:  CLINICAL INFORMATION: Abdominal pain.    COMPARISON: August 1, 2012.    PROCEDURE:   CT of the Abdomen and Pelvis was performed with intravenous contrast.   Intravenous contrast: 90 ml Omnipaque 350. 10 ml discarded.  Oral contrast: None.  Sagittal and coronal reformats were performed.    FINDINGS:.    LOWER CHEST: Small left pleural effusion. Subcentimeter subpleural left   lower lobe pulmonary nodule.    LIVER: Indeterminant cluster of hypodense lesions in the right hepatic   lobe on series 3 image 25.  BILE DUCTS: Normal caliber.  GALLBLADDER: Within normal limits.  SPLEEN: Within normal limits.   PANCREAS: Within normal limits.  ADRENALS: Within normal limits.   KIDNEYS/URETERS: Mild bilateral hydronephrosis. Cysts.     BLADDER: Within normal limits.   REPRODUCTIVE ORGANS: Within normal limits.    BOWEL: No bowel obstruction.  PERITONEUM: No ascites.   VESSELS:  Within normal limits.  RETROPERITONEUM/LYMPH NODES: No lymphadenopathy.     ABDOMINAL WALL: Within normal limits.  BONES: Scattered lytic osseous lesions.    IMPRESSION: Lytic osseous lesions suspicious for metastatic disease.    Mild bilateral hydronephrosis.    Cluster of hypodense lesions in the right hepatic lobe is indeterminate.                      JENNIFER STOCKTON M.D., ATTENDING RADIOLOGIST  This document has been electronically signed. Oct 15 2019  8:41AM    < end of copied text >

## 2019-10-17 NOTE — PROGRESS NOTE ADULT - SUBJECTIVE AND OBJECTIVE BOX
Chief Complaint: fu    History of Present Illness:  more confused today; no f/c, no cp, no dyspnea, no n/v, +abd pain, tolerating diet; no bleeding, no back pain      MEDICATIONS  (STANDING):  amLODIPine   Tablet 2.5 milliGRAM(s) Oral daily  cefTRIAXone   IVPB 1000 milliGRAM(s) IV Intermittent every 24 hours  chlorhexidine 2% Cloths 1 Application(s) Topical daily  docusate sodium 100 milliGRAM(s) Oral daily  metoprolol succinate ER 25 milliGRAM(s) Oral daily  nortriptyline 25 milliGRAM(s) Oral daily  pantoprazole    Tablet 40 milliGRAM(s) Oral two times a day  polyethylene glycol 3350 17 Gram(s) Oral daily  senna 2 Tablet(s) Oral at bedtime  sodium chloride 0.9%. 1000 milliLiter(s) (75 mL/Hr) IV Continuous <Continuous>    MEDICATIONS  (PRN):  acetaminophen    Suspension .. 650 milliGRAM(s) Oral every 4 hours PRN Temp greater or equal to 38C (100.4F), Moderate Pain (4 - 6)  ondansetron Injectable 4 milliGRAM(s) IV Push every 6 hours PRN Nausea and/or Vomiting      Allergies    penicillin (Unknown)    Intolerances        Vital Signs Last 24 Hrs  T(C): 36.7 (17 Oct 2019 07:37), Max: 37.6 (16 Oct 2019 22:08)  T(F): 98.1 (17 Oct 2019 07:37), Max: 99.7 (16 Oct 2019 22:08)  HR: 90 (17 Oct 2019 07:37) (90 - 100)  BP: 155/73 (17 Oct 2019 07:37) (147/64 - 176/67)  BP(mean): --  RR: 8 (17 Oct 2019 07:37) (8 - 18)  SpO2: 93% (17 Oct 2019 07:37) (92% - 94%)    PHYSICAL EXAM  General: elderly adult in NAD  HEENT: clear oropharynx, anicteric sclera, pink conjunctiva  Neck: supple  CV: normal S1/S2   Lungs: decreased BS, poor effort  Abdomen: soft sl distended but improved; sl left TTP, positive bowel sounds  Ext: no clubbing cyanosis or edema  Lymph Nodes: No LAD in axillae, groin, neck  Neuro: awake, confused    LABS:                          10.9   14.78 )-----------( 425      ( 17 Oct 2019 11:02 )             32.6         Mean Cell Volume : 84.2 fl  Mean Cell Hemoglobin : 28.2 pg  Mean Cell Hemoglobin Concentration : 33.4 gm/dL  Auto Neutrophil # : x  Auto Lymphocyte # : x  Auto Monocyte # : x  Auto Eosinophil # : x  Auto Basophil # : x  Auto Neutrophil % : x  Auto Lymphocyte % : x  Auto Monocyte % : x  Auto Eosinophil % : x  Auto Basophil % : x      Serial CBC's  10-17 @ 11:02  Hct-32.6 / Hgb-10.9 / Plat-425 / RBC-3.87 / WBC-14.78  Serial CBC's  10-16 @ 15:19  Hct-24.5 / Hgb-8.1 / Plat-442 / RBC-2.94 / WBC-14.01  Serial CBC's  10-16 @ 11:14  Hct-21.3 / Hgb-6.9 / Plat-393 / RBC-2.54 / WBC-12.98  Serial CBC's  10-16 @ 08:15  Hct-24.2 / Hgb-7.7 / Plat-443 / RBC-2.83 / WBC-15.93  Serial CBC's  10-15 @ 09:12  Hct-30.5 / Hgb-9.6 / Plat-625 / RBC-3.53 / WBC-21.04  Serial CBC's  10-14 @ 21:41  Hct-29.7 / Hgb-9.4 / Plat-640 / RBC-3.49 / WBC-21.74  Serial CBC's  10-14 @ 08:26  Hct-29.6 / Hgb-9.3 / Plat-674 / RBC-3.44 / WBC-22.77      10-17    137  |  98  |  21  ----------------------------<  142<H>  3.5   |  27  |  0.48<L>    Ca    9.5      17 Oct 2019 11:02            Ferritin, Serum: 1918 ng/mL (10-16 @ 10:04)  Vitamin B12, Serum: 518 pg/mL (10-16 @ 10:04)  Folate, Serum: 4.5 ng/mL (10-16 @ 10:04)  Iron - Total Binding Capacity.: 120 ug/dL (10-16 @ 10:04)              Radiology:  < from: CT Chest No Cont (10.16.19 @ 11:17) >    Impression: Findings suggestive of bony metastasis.    Small bilateral pleural effusions.    There is a 4.3 cm low-attenuation lesion in the right lobe of the thyroid   gland. Further evaluation with ultrasound is recommended.    < end of copied text >    < from: NM Bone Imaging Total (10.16.19 @ 13:31) >  IMPRESSION: Abnormal bone scan. Metastatic disease involving the mid and   lower thoracic spine, left lower rib, the pelvis, and proximal right   femur.      < from: MR Head No Cont (10.16.19 @ 17:59) >  IMPRESSION:    No acute intracranial hemorrhage, mass effect, vasogenic edema, or   evidence of acute territorial infarct.    Chronic right temporal infarct.    < end of copied text >

## 2019-10-17 NOTE — PROGRESS NOTE ADULT - ATTENDING COMMENTS
11. Hypokalemia await labs  12.  Leukocytosis has been stable may be related to Malignancy and or her stress reaction from anemia/GI bleed continue antibiotics for now appreciate ID input  13. Right middle lobe mucoid impaction no treatment needed at this time  14.Urinary retention get imaging of spine    Gamal Martinez MD, FAAP  office [phone]  cell [phone]

## 2019-10-17 NOTE — PROGRESS NOTE ADULT - ASSESSMENT
89F w/ hx of DM2, Breast cancer, gastric ulcer, afib not on AC due to falls risk, HTN, HLD p/w worsening weakness and anemia, treated for UTI, found to have lytic lesions on CT abdomen    #lytic lesions on CT- with history of Breast cancer Stage 1c mucinous, ER+/[id number]+/[id number] - s/p lumpectomy- patient had refused any adjuvant hormonal treatment; recently CA 27-29 with slight trend up though still normal; mammogram/US 10/30/18- negative  - likely metastatic breast cancer;   - CT chest without lung lesions; MRI brain no mets; bone scan with multiple mets- mid/lower thoracic spine, left lower rib, pelvis, proximal right femur without cortical disruption  - MRI spine pending  - family considering possible eventual bone biopsy for further eval    #Anemia, normocytic- likely multifactorial- AOCD + GI loss with guaiac positive stool  - recent EGD with gastritis/duodenal ulcer, guaiac + this admission  - s/p 1 unit PRBC 10/11  - iron studies AOCD; B12 level normal; Folate slightly low- start replacement  - Hg improved    #thrombocytosis- likely reactive in setting of acute illness/ UTI/ recent GIB  - platelet count had been normal 2018, monitor    #hypercalcemia- likely secondary to malignancy +/- volume depletion  - now off HCTZ  - on hydration  - PTH low, PTHrP pending  - Ca continues to trend down    #ID- on Abx for UTI    #confusion- Ca trending down, nonfocal exam, pt afebrile  - monitor    d/w Dr. Zendejas, adrian Carrillo at bedside

## 2019-10-18 LAB
% ALBUMIN: 35 % — SIGNIFICANT CHANGE UP
% ALPHA 1: 14 % — SIGNIFICANT CHANGE UP
% ALPHA 2: 23.7 % — SIGNIFICANT CHANGE UP
% BETA: 12.8 % — SIGNIFICANT CHANGE UP
% GAMMA: 14.5 % — SIGNIFICANT CHANGE UP
% M SPIKE: 3.8 % — SIGNIFICANT CHANGE UP
ALBUMIN SERPL ELPH-MCNC: 1.9 G/DL — LOW (ref 3.6–5.5)
ALBUMIN/GLOB SERPL ELPH: 0.5 RATIO — SIGNIFICANT CHANGE UP
ALPHA1 GLOB SERPL ELPH-MCNC: 0.8 G/DL — HIGH (ref 0.1–0.4)
ALPHA2 GLOB SERPL ELPH-MCNC: 1.3 G/DL — HIGH (ref 0.5–1)
ANION GAP SERPL CALC-SCNC: 14 MMOL/L — SIGNIFICANT CHANGE UP (ref 5–17)
B-GLOBULIN SERPL ELPH-MCNC: 0.7 G/DL — SIGNIFICANT CHANGE UP (ref 0.5–1)
BUN SERPL-MCNC: 16 MG/DL — SIGNIFICANT CHANGE UP (ref 7–23)
CALCIUM SERPL-MCNC: 9.2 MG/DL — SIGNIFICANT CHANGE UP (ref 8.4–10.5)
CHLORIDE SERPL-SCNC: 98 MMOL/L — SIGNIFICANT CHANGE UP (ref 96–108)
CO2 SERPL-SCNC: 26 MMOL/L — SIGNIFICANT CHANGE UP (ref 22–31)
CREAT SERPL-MCNC: 0.4 MG/DL — LOW (ref 0.5–1.3)
CULTURE RESULTS: SIGNIFICANT CHANGE UP
CULTURE RESULTS: SIGNIFICANT CHANGE UP
GAMMA GLOBULIN: 0.8 G/DL — SIGNIFICANT CHANGE UP (ref 0.6–1.6)
GLUCOSE BLDC GLUCOMTR-MCNC: 120 MG/DL — HIGH (ref 70–99)
GLUCOSE BLDC GLUCOMTR-MCNC: 160 MG/DL — HIGH (ref 70–99)
GLUCOSE BLDC GLUCOMTR-MCNC: 226 MG/DL — HIGH (ref 70–99)
GLUCOSE SERPL-MCNC: 125 MG/DL — HIGH (ref 70–99)
HCT VFR BLD CALC: 29.8 % — LOW (ref 34.5–45)
HGB BLD-MCNC: 9.9 G/DL — LOW (ref 11.5–15.5)
INTERPRETATION SERPL IFE-IMP: SIGNIFICANT CHANGE UP
KAPPA LC SER QL IFE: 2.83 MG/DL — HIGH (ref 0.33–1.94)
KAPPA/LAMBDA FREE LIGHT CHAIN RATIO, SERUM: 1.2 RATIO — SIGNIFICANT CHANGE UP (ref 0.26–1.65)
LAMBDA LC SER QL IFE: 2.36 MG/DL — SIGNIFICANT CHANGE UP (ref 0.57–2.63)
M-SPIKE: 0.2 G/DL — HIGH (ref 0–0)
MCHC RBC-ENTMCNC: 28 PG — SIGNIFICANT CHANGE UP (ref 27–34)
MCHC RBC-ENTMCNC: 33.2 GM/DL — SIGNIFICANT CHANGE UP (ref 32–36)
MCV RBC AUTO: 84.2 FL — SIGNIFICANT CHANGE UP (ref 80–100)
NRBC # BLD: 0 /100 WBCS — SIGNIFICANT CHANGE UP (ref 0–0)
PLATELET # BLD AUTO: 438 K/UL — HIGH (ref 150–400)
POTASSIUM SERPL-MCNC: 3.6 MMOL/L — SIGNIFICANT CHANGE UP (ref 3.5–5.3)
POTASSIUM SERPL-SCNC: 3.6 MMOL/L — SIGNIFICANT CHANGE UP (ref 3.5–5.3)
PROT ?TM UR-MCNC: 31 MG/DL — HIGH (ref 0–12)
PROT PATTERN SERPL ELPH-IMP: SIGNIFICANT CHANGE UP
PROT SERPL-MCNC: 5.4 G/DL — LOW (ref 6–8.3)
PROT SERPL-MCNC: 5.4 G/DL — LOW (ref 6–8.3)
RBC # BLD: 3.54 M/UL — LOW (ref 3.8–5.2)
RBC # FLD: 14.3 % — SIGNIFICANT CHANGE UP (ref 10.3–14.5)
SODIUM SERPL-SCNC: 138 MMOL/L — SIGNIFICANT CHANGE UP (ref 135–145)
SPECIMEN SOURCE: SIGNIFICANT CHANGE UP
SPECIMEN SOURCE: SIGNIFICANT CHANGE UP
WBC # BLD: 18.13 K/UL — HIGH (ref 3.8–10.5)
WBC # FLD AUTO: 18.13 K/UL — HIGH (ref 3.8–10.5)

## 2019-10-18 PROCEDURE — 72157 MRI CHEST SPINE W/O & W/DYE: CPT | Mod: 26

## 2019-10-18 PROCEDURE — 72158 MRI LUMBAR SPINE W/O & W/DYE: CPT | Mod: 26

## 2019-10-18 PROCEDURE — 99233 SBSQ HOSP IP/OBS HIGH 50: CPT

## 2019-10-18 PROCEDURE — 99232 SBSQ HOSP IP/OBS MODERATE 35: CPT

## 2019-10-18 RX ORDER — SIMETHICONE 80 MG/1
80 TABLET, CHEWABLE ORAL
Refills: 0 | Status: DISCONTINUED | OUTPATIENT
Start: 2019-10-18 | End: 2019-11-04

## 2019-10-18 RX ORDER — AMLODIPINE BESYLATE 2.5 MG/1
5 TABLET ORAL DAILY
Refills: 0 | Status: DISCONTINUED | OUTPATIENT
Start: 2019-10-19 | End: 2019-11-01

## 2019-10-18 RX ORDER — AMLODIPINE BESYLATE 2.5 MG/1
2.5 TABLET ORAL ONCE
Refills: 0 | Status: COMPLETED | OUTPATIENT
Start: 2019-10-18 | End: 2019-10-18

## 2019-10-18 RX ADMIN — AMLODIPINE BESYLATE 2.5 MILLIGRAM(S): 2.5 TABLET ORAL at 06:12

## 2019-10-18 RX ADMIN — PANTOPRAZOLE SODIUM 40 MILLIGRAM(S): 20 TABLET, DELAYED RELEASE ORAL at 17:07

## 2019-10-18 RX ADMIN — Medication 25 MILLIGRAM(S): at 06:12

## 2019-10-18 RX ADMIN — CHLORHEXIDINE GLUCONATE 1 APPLICATION(S): 213 SOLUTION TOPICAL at 13:01

## 2019-10-18 RX ADMIN — Medication 650 MILLIGRAM(S): at 09:26

## 2019-10-18 RX ADMIN — Medication 1 MILLIGRAM(S): at 18:14

## 2019-10-18 RX ADMIN — PANTOPRAZOLE SODIUM 40 MILLIGRAM(S): 20 TABLET, DELAYED RELEASE ORAL at 06:12

## 2019-10-18 RX ADMIN — NORTRIPTYLINE HYDROCHLORIDE 25 MILLIGRAM(S): 10 CAPSULE ORAL at 22:46

## 2019-10-18 RX ADMIN — SENNA PLUS 2 TABLET(S): 8.6 TABLET ORAL at 22:46

## 2019-10-18 RX ADMIN — AMLODIPINE BESYLATE 2.5 MILLIGRAM(S): 2.5 TABLET ORAL at 17:07

## 2019-10-18 RX ADMIN — Medication 650 MILLIGRAM(S): at 02:39

## 2019-10-18 RX ADMIN — Medication 650 MILLIGRAM(S): at 03:10

## 2019-10-18 RX ADMIN — Medication 650 MILLIGRAM(S): at 18:54

## 2019-10-18 RX ADMIN — CEFTRIAXONE 100 MILLIGRAM(S): 500 INJECTION, POWDER, FOR SOLUTION INTRAMUSCULAR; INTRAVENOUS at 17:07

## 2019-10-18 RX ADMIN — Medication 650 MILLIGRAM(S): at 08:56

## 2019-10-18 NOTE — CHART NOTE - NSCHARTNOTEFT_GEN_A_CORE
Upon Nutritional Assessment by the Registered Dietitian your patient was determined to meet criteria / has evidence of the following diagnosis/diagnoses:          [ ]  Mild Protein Calorie Malnutrition        [ ]  Moderate Protein Calorie Malnutrition        [x] Severe Protein Calorie Malnutrition        [ ] Unspecified Protein Calorie Malnutrition        [ ] Underweight / BMI <19        [ ] Morbid Obesity / BMI > 40      Findings as based on:  [x] Comprehensive nutrition assessment   [ ] Nutrition Focused Physical Exam  [ ] Other:       Nutrition Plan/Recommendations:      Continue c health shakes. Encouraged PO intake-small, frequent meals and nutrient dense snacks. In house, encouraged pt to order nutrient dense snacks c meals to consume in between meals to mimic small, frequent meals. Discussed protein rich foods available on menu. Discussed consuming protein first at meal times and then eating the other foods. Consider liberalizing diet to regular.     PROVIDER Section:     By signing this assessment you are acknowledging and agree with the diagnosis/diagnoses assigned by the Registered Dietitian    Comments:

## 2019-10-18 NOTE — PROGRESS NOTE ADULT - ASSESSMENT
89 F w/ hx of DM2, Breast cancer, gastric ulcer, afib not on AC due to falls risk, HTN, HLD p/w worsening weakness and anemia. Pt was recently admitted for GIB roughly 3 weeks ago and upon endoscopy was found to have gastritis and duodenal ulcer.  Leukocytosis, no fever  UA was negative, then became positive; UCX contam--subspeciated with multiple type E coli, S CTX, cefazolin  BCX NGTD  Rising WBC during hospital stay--unclear significance  CT A/P negative, but evidence metastatic disease  Has PCN allergy, tolerating cefepime/ceftriaxone  Complete treatment for UTI  Overall, UTI, leukocytosis, weakness  - Ceftriaxone 1g q 24 while inpatient  - Abx through 10/21/19, if DC planning, can send out on PO Keflex  - Trend WBC, monitor for further signs infection/sepsis  - Malignancy workup per primary team    Denis Peng MD  Pager [phone]  After 5pm and on weekends call [phone]

## 2019-10-18 NOTE — PROGRESS NOTE ADULT - SUBJECTIVE AND OBJECTIVE BOX
INTERVAL HPI/OVERNIGHT EVENTS:  Patient was not readily aroused this morning, she was more alert and responsive yesterday Her  family member  joined me bedside , noted that patient had not eaten her breakfast.. Report  she had a bowel movement , no report of dark stools     MEDICATIONS  (STANDING):  amLODIPine   Tablet 2.5 milliGRAM(s) Oral daily  cefTRIAXone   IVPB 1000 milliGRAM(s) IV Intermittent every 24 hours  chlorhexidine 2% Cloths 1 Application(s) Topical daily  docusate sodium 100 milliGRAM(s) Oral daily  folic acid 1 milliGRAM(s) Oral daily  metoprolol succinate ER 25 milliGRAM(s) Oral daily  nortriptyline 25 milliGRAM(s) Oral daily  pantoprazole    Tablet 40 milliGRAM(s) Oral two times a day  polyethylene glycol 3350 17 Gram(s) Oral daily  senna 2 Tablet(s) Oral at bedtime  sodium chloride 0.9%. 1000 milliLiter(s) (75 mL/Hr) IV Continuous <Continuous>    MEDICATIONS  (PRN):  acetaminophen    Suspension .. 650 milliGRAM(s) Oral every 4 hours PRN Temp greater or equal to 38C (100.4F), Moderate Pain (4 - 6)  ondansetron Injectable 4 milliGRAM(s) IV Push every 6 hours PRN Nausea and/or Vomiting      Allergies    penicillin (Unknown)    Intolerances        Review of Systems:  Unable to obtain from patient   General:  no fevers    Resp:  No dyspnea, cough, tachypnea, wheezing noted   GI: no report of nausea or vomiting       Vital Signs Last 24 Hrs  T(C): 36.5 (18 Oct 2019 11:18), Max: 37.7 (18 Oct 2019 00:13)  T(F): 97.7 (18 Oct 2019 11:18), Max: 99.8 (18 Oct 2019 00:13)  HR: 91 (18 Oct 2019 11:18) (91 - 107)  BP: 160/79 (18 Oct 2019 11:18) (160/79 - 170/80)  BP(mean): --  RR: 18 (18 Oct 2019 11:18) (18 - 18)  SpO2: 95% (18 Oct 2019 11:18) (93% - 95%)    PHYSICAL EXAM:    Constitutional: thin frail female , non toxic in NAD., lethargic   Respiratory: anterior chest wall clear to auscultation  Cardiovascular: S1 and S2, RRR  Gastrointestinal: soft mildly distended girth with +BS  Extremities: No peripheral edema, or  cyanosis  Skin: No visible rashes      LABS:                        9.9    18.13 )-----------( 438      ( 18 Oct 2019 13:25 )             29.8     10-18  Hemoglobin: 9.9 g/dL <L> [11.5 - 15.5] (10-18 @ 13:25)  Hemoglobin: 10.9 g/dL <L> [11.5 - 15.5] (10-17 @ 11:02)  Hemoglobin: 8.1 g/dL <L> [11.5 - 15.5] (10-16 @ 15:19)  Hemoglobin: 6.9 g/dL <LL> [11.5 - 15.5] (10-16 @ 11:14)  Hemoglobin: 7.7 g/dL <L> [11.5 - 15.5] (10-16 @ 08:15)              138  |  98  |  16  ----------------------------<  125<H>  3.6   |  26  |  0.40<L>    Ca    9.2      18 Oct 2019 13:25    TPro  5.4<L>  /  Alb  x   /  TBili  x   /  DBili  x   /  AST  x   /  ALT  x   /  AlkPhos  x   10-18        LIVER FUNCTIONS - ( 18 Oct 2019 10:07 )  Alb: x     / Pro: 5.4 g/dL / ALK PHOS: x     / ALT: x     / AST: x     / GGT: x             RADIOLOGY & ADDITIONAL TESTS:

## 2019-10-18 NOTE — PROGRESS NOTE ADULT - ATTENDING COMMENTS
11. Hypokalemia resolved  12.  Leukocytosis improved since switched to cephalosporin e coli in urine was resist to quinalones  13. Right middle lobe mucoid impaction no treatment needed at this time  14.Urinary retention get imaging of spine    Gamal Martinez MD, FAAP  office [phone]  cell [phone]

## 2019-10-18 NOTE — PROGRESS NOTE ADULT - ASSESSMENT
89 yr old female with hx breast cancer ,recent herpes zoster that she was taking NSAIDS for. Labs noted for anemia , she had recent endoscopy for anemia on September 18th noted for hiatal hernia, gastritis with superficial ulcerations and erosions in the stomach and duodenum. She was found to be H. pylori +, no treatment per family request. There has been no  report of active GI blood loss. and her Hgb/ Hct are saira now . She is receiving antibiotics for ,UTI.    A CT dated 10/14 to further evaluate abdominal distention did not report any bowel distention or ascites, but it did reported lytic osseous lesions suspicious for metastatic disease. Patient had bone scan that reported metastatic disease to the mid and lower thoracic spine,  left lower  rib / pelvis and prox right femur . Family to determine goals of care        Recommendations:    Monitor labs and trends as ordered   Continue PPI BID   Duration of antibiotics deferred to primary team   Monitor bowel movements on Miralax, Colace and Senna   Defer bone scan findings to primary team for discussion with patient and family to establish coals of care        TINO Rangel  Coverage for [facility]  [phone]  Call [phone] after hours and weekend

## 2019-10-18 NOTE — PROGRESS NOTE ADULT - ASSESSMENT
89F w/ hx of DM2, Breast cancer, gastric ulcer, afib not on AC due to falls risk, HTN, HLD p/w worsening weakness and anemia, treated for UTI, found to have lytic lesions on CT abdomen    #lytic lesions on CT- with history of Breast cancer Stage 1c mucinous, ER+/VT+/[id number] - s/p lumpectomy- patient had refused any adjuvant hormonal treatment; recently CA 27-29 with slight trend up though still normal; mammogram/US 10/30/18- negative  - likely metastatic breast cancer  - CT chest without lung lesions; MRI brain no mets; bone scan with multiple mets- mid/lower thoracic spine, left lower rib, pelvis, proximal right femur without cortical disruption  - MRI spine pending  - SPEP/IF pending- K/L ratio is normal  - family considering possible eventual bone biopsy for further eval- ideally obtain biopsy for further eval/diagnosis    #Anemia, normocytic- likely multifactorial- AOCD + GI loss with guaiac positive stool  - recent EGD with gastritis/duodenal ulcer, guaiac + this admission  - s/p 1 unit PRBC 10/11  - iron studies AOCD; B12 level normal; Folate slightly low- on replacement  - Hg improved    #thrombocytosis- likely reactive in setting of acute illness/ UTI/ recent GIB  - platelet count had been normal 2018, monitor    #hypercalcemia- likely secondary to malignancy +/- volume depletion  - now off HCTZ  - on hydration  - PTH low, PTHrP pending  - Ca continues to trend down    #ID- on Abx for UTI    #confusion- Ca trending down, nonfocal exam, pt afebrile  - monitor, seems improved, likely delirium    d/w Dr. Zendejas at bedside  d/w Dr. Martinez

## 2019-10-18 NOTE — PROGRESS NOTE ADULT - SUBJECTIVE AND OBJECTIVE BOX
Chief Complaint:    History of Present Illness:      MEDICATIONS  (STANDING):  amLODIPine   Tablet 2.5 milliGRAM(s) Oral daily  amLODIPine   Tablet 2.5 milliGRAM(s) Oral once  cefTRIAXone   IVPB 1000 milliGRAM(s) IV Intermittent every 24 hours  chlorhexidine 2% Cloths 1 Application(s) Topical daily  docusate sodium 100 milliGRAM(s) Oral daily  folic acid 1 milliGRAM(s) Oral daily  metoprolol succinate ER 25 milliGRAM(s) Oral daily  nortriptyline 25 milliGRAM(s) Oral daily  pantoprazole    Tablet 40 milliGRAM(s) Oral two times a day  polyethylene glycol 3350 17 Gram(s) Oral daily  senna 2 Tablet(s) Oral at bedtime    MEDICATIONS  (PRN):  acetaminophen    Suspension .. 650 milliGRAM(s) Oral every 4 hours PRN Temp greater or equal to 38C (100.4F), Moderate Pain (4 - 6)  ondansetron Injectable 4 milliGRAM(s) IV Push every 6 hours PRN Nausea and/or Vomiting      Allergies    penicillin (Unknown)    Intolerances        Vital Signs Last 24 Hrs  T(C): 36.5 (18 Oct 2019 11:18), Max: 37.7 (18 Oct 2019 00:13)  T(F): 97.7 (18 Oct 2019 11:18), Max: 99.8 (18 Oct 2019 00:13)  HR: 91 (18 Oct 2019 11:18) (91 - 107)  BP: 160/79 (18 Oct 2019 11:18) (160/79 - 170/80)  BP(mean): --  RR: 18 (18 Oct 2019 11:18) (18 - 18)  SpO2: 95% (18 Oct 2019 11:18) (93% - 95%)    PHYSICAL EXAM  General: adult in NAD  HEENT: clear oropharynx, anicteric sclera, pink conjunctiva  Neck: supple  CV: normal S1/S2 with no murmur rubs or gallops  Lungs: clear to auscultation, no wheezes, no rales  Abdomen: soft non-tender non-distended, no hepatosplenomegaly, positive bowel sounds  Ext: no clubbing cyanosis or edema  Skin: no rashes and no petechiae  Lymph Nodes: No LAD in axillae, groin, neck  Neuro: alert and oriented X 3, no focal deficits    LABS:                          9.9    18.13 )-----------( 438      ( 18 Oct 2019 13:25 )             29.8         Mean Cell Volume : 84.2 fl  Mean Cell Hemoglobin : 28.0 pg  Mean Cell Hemoglobin Concentration : 33.2 gm/dL  Auto Neutrophil # : x  Auto Lymphocyte # : x  Auto Monocyte # : x  Auto Eosinophil # : x  Auto Basophil # : x  Auto Neutrophil % : x  Auto Lymphocyte % : x  Auto Monocyte % : x  Auto Eosinophil % : x  Auto Basophil % : x      Serial CBC's  10-18 @ 13:25  Hct-29.8 / Hgb-9.9 / Plat-438 / RBC-3.54 / WBC-18.13  Serial CBC's  10-17 @ 11:02  Hct-32.6 / Hgb-10.9 / Plat-425 / RBC-3.87 / WBC-14.78  Serial CBC's  10-16 @ 15:19  Hct-24.5 / Hgb-8.1 / Plat-442 / RBC-2.94 / WBC-14.01  Serial CBC's  10-16 @ 11:14  Hct-21.3 / Hgb-6.9 / Plat-393 / RBC-2.54 / WBC-12.98  Serial CBC's  10-16 @ 08:15  Hct-24.2 / Hgb-7.7 / Plat-443 / RBC-2.83 / WBC-15.93  Serial CBC's  10-15 @ 09:12  Hct-30.5 / Hgb-9.6 / Plat-625 / RBC-3.53 / WBC-21.04  Serial CBC's  10-14 @ 21:41  Hct-29.7 / Hgb-9.4 / Plat-640 / RBC-3.49 / WBC-21.74      10-18    138  |  98  |  16  ----------------------------<  125<H>  3.6   |  26  |  0.40<L>    Ca    9.2      18 Oct 2019 13:25    TPro  5.4<L>  /  Alb  x   /  TBili  x   /  DBili  x   /  AST  x   /  ALT  x   /  AlkPhos  x   10-18          Ferritin, Serum: 1918 ng/mL (10-16 @ 10:04)  Vitamin B12, Serum: 518 pg/mL (10-16 @ 10:04)  Folate, Serum: 4.5 ng/mL (10-16 @ 10:04)  Iron - Total Binding Capacity.: 120 ug/dL (10-16 @ 10:04)      GUILLERMINA Kappa: 2.83 mg/dL (10-18 @ 10:07)  GUILLERMINA Lambda: 2.36 mg/dL (10-18 @ 10:07)          Radiology: Chief Complaint: fu    History of Present Illness:  still with pain left side; no f/c, no cp, no dyspnea, no n/v/abd pain, no leg pain, mental state seems clearer      MEDICATIONS  (STANDING):  amLODIPine   Tablet 2.5 milliGRAM(s) Oral daily  amLODIPine   Tablet 2.5 milliGRAM(s) Oral once  cefTRIAXone   IVPB 1000 milliGRAM(s) IV Intermittent every 24 hours  chlorhexidine 2% Cloths 1 Application(s) Topical daily  docusate sodium 100 milliGRAM(s) Oral daily  folic acid 1 milliGRAM(s) Oral daily  metoprolol succinate ER 25 milliGRAM(s) Oral daily  nortriptyline 25 milliGRAM(s) Oral daily  pantoprazole    Tablet 40 milliGRAM(s) Oral two times a day  polyethylene glycol 3350 17 Gram(s) Oral daily  senna 2 Tablet(s) Oral at bedtime    MEDICATIONS  (PRN):  acetaminophen    Suspension .. 650 milliGRAM(s) Oral every 4 hours PRN Temp greater or equal to 38C (100.4F), Moderate Pain (4 - 6)  ondansetron Injectable 4 milliGRAM(s) IV Push every 6 hours PRN Nausea and/or Vomiting      Allergies    penicillin (Unknown)    Intolerances        Vital Signs Last 24 Hrs  T(C): 36.5 (18 Oct 2019 11:18), Max: 37.7 (18 Oct 2019 00:13)  T(F): 97.7 (18 Oct 2019 11:18), Max: 99.8 (18 Oct 2019 00:13)  HR: 91 (18 Oct 2019 11:18) (91 - 107)  BP: 160/79 (18 Oct 2019 11:18) (160/79 - 170/80)  BP(mean): --  RR: 18 (18 Oct 2019 11:18) (18 - 18)  SpO2: 95% (18 Oct 2019 11:18) (93% - 95%)    PHYSICAL EXAM  General: adult in NAD  HEENT: clear oropharynx, anicteric sclera, pink conjunctiva  Neck: supple  CV: normal S1/S2   Lungs: decreased BS, poor effort  Abdomen: soft non-tender non-distended, positive bowel sounds  Ext: no clubbing cyanosis or edema  Skin: no rashes and no petechiae  Lymph Nodes: No LAD in axillae, groin, neck  Neuro: alert and oriented X 3, no focal deficits    LABS:                          9.9    18.13 )-----------( 438      ( 18 Oct 2019 13:25 )             29.8         Mean Cell Volume : 84.2 fl  Mean Cell Hemoglobin : 28.0 pg  Mean Cell Hemoglobin Concentration : 33.2 gm/dL  Auto Neutrophil # : x  Auto Lymphocyte # : x  Auto Monocyte # : x  Auto Eosinophil # : x  Auto Basophil # : x  Auto Neutrophil % : x  Auto Lymphocyte % : x  Auto Monocyte % : x  Auto Eosinophil % : x  Auto Basophil % : x      Serial CBC's  10-18 @ 13:25  Hct-29.8 / Hgb-9.9 / Plat-438 / RBC-3.54 / WBC-18.13  Serial CBC's  10-17 @ 11:02  Hct-32.6 / Hgb-10.9 / Plat-425 / RBC-3.87 / WBC-14.78  Serial CBC's  10-16 @ 15:19  Hct-24.5 / Hgb-8.1 / Plat-442 / RBC-2.94 / WBC-14.01  Serial CBC's  10-16 @ 11:14  Hct-21.3 / Hgb-6.9 / Plat-393 / RBC-2.54 / WBC-12.98  Serial CBC's  10-16 @ 08:15  Hct-24.2 / Hgb-7.7 / Plat-443 / RBC-2.83 / WBC-15.93  Serial CBC's  10-15 @ 09:12  Hct-30.5 / Hgb-9.6 / Plat-625 / RBC-3.53 / WBC-21.04  Serial CBC's  10-14 @ 21:41  Hct-29.7 / Hgb-9.4 / Plat-640 / RBC-3.49 / WBC-21.74      10-18    138  |  98  |  16  ----------------------------<  125<H>  3.6   |  26  |  0.40<L>    Ca    9.2      18 Oct 2019 13:25    TPro  5.4<L>  /  Alb  x   /  TBili  x   /  DBili  x   /  AST  x   /  ALT  x   /  AlkPhos  x   10-18          Ferritin, Serum: 1918 ng/mL (10-16 @ 10:04)  Vitamin B12, Serum: 518 pg/mL (10-16 @ 10:04)  Folate, Serum: 4.5 ng/mL (10-16 @ 10:04)  Iron - Total Binding Capacity.: 120 ug/dL (10-16 @ 10:04)      GUILLERMINA Kappa: 2.83 mg/dL (10-18 @ 10:07)  GUILLERMINA Lambda: 2.36 mg/dL (10-18 @ 10:07)          Radiology:

## 2019-10-18 NOTE — PROGRESS NOTE ADULT - SUBJECTIVE AND OBJECTIVE BOX
CC: F/U for UTI    Saw/spoke to patient. No fevers, no chills. No new complaints. Unchanged.    Allergies  penicillin (Unknown)    ANTIMICROBIALS:  cefTRIAXone   IVPB 1000 every 24 hours    PE:    Vital Signs Last 24 Hrs  T(C): 36.5 (18 Oct 2019 11:18), Max: 37.7 (18 Oct 2019 00:13)  T(F): 97.7 (18 Oct 2019 11:18), Max: 99.8 (18 Oct 2019 00:13)  HR: 91 (18 Oct 2019 11:18) (91 - 107)  BP: 160/79 (18 Oct 2019 11:18) (160/79 - 170/80)  RR: 18 (18 Oct 2019 11:18) (18 - 18)  SpO2: 95% (18 Oct 2019 11:18) (93% - 95%)    Gen: AOx3, NAD, non-toxic, pleasant  CV: S1+S2 normal, nontachycardic  Resp: Clear bilat, no resp distress, no crackles/wheezes  Abd: Soft, nontender, +BS  Ext: No LE edema, no wounds    LABS:                        10.9   14.78 )-----------( 425      ( 17 Oct 2019 11:02 )             32.6     10-17    137  |  98  |  21  ----------------------------<  142<H>  3.5   |  27  |  0.48<L>    Ca    9.5      17 Oct 2019 11:02    TPro  5.4<L>  /  Alb  x   /  TBili  x   /  DBili  x   /  AST  x   /  ALT  x   /  AlkPhos  x   10-18    MICROBIOLOGY:    .Urine  10-16-19   <10,000 CFU/mL Normal Urogenital Mily     .Blood  10-16-19   No growth to date.     .Blood  10-15-19   No growth to date.    .Urine  10-13-19   >100,000 CFU/ml Escherichia coli  Multiple Morphological Strains  --  Escherichia coli  Escherichia coli    (otherwise reviewed)    RADIOLOGY:    10/16 MR:    IMPRESSION:    No acute intracranial hemorrhage, mass effect, vasogenic edema, or   evidence of acute territorial infarct.    Chronic right temporal infarct.

## 2019-10-18 NOTE — CHART NOTE - NSCHARTNOTEFT_GEN_A_CORE
Nutrition Follow Up Note  Patient seen for: Malnutrition Follow Up     Interim events noted, chart reviewed. Pt c GIB, UTI, concern for metastatic disease, noted Oncology is following.     Source: pt, son     Diet : lacto-ovo vegetarian, low sodium, consistent CHO diet     Patient reports:    Daily Weight in k.5 (10-15), no new wt at this time; noted : 101.4-> 10/11: 91.7 pounds     Pertinent Medications: MEDICATIONS  (STANDING):  amLODIPine   Tablet 2.5 milliGRAM(s) Oral daily  amLODIPine   Tablet 2.5 milliGRAM(s) Oral once  cefTRIAXone   IVPB 1000 milliGRAM(s) IV Intermittent every 24 hours  chlorhexidine 2% Cloths 1 Application(s) Topical daily  docusate sodium 100 milliGRAM(s) Oral daily  folic acid 1 milliGRAM(s) Oral daily  metoprolol succinate ER 25 milliGRAM(s) Oral daily  nortriptyline 25 milliGRAM(s) Oral daily  pantoprazole    Tablet 40 milliGRAM(s) Oral two times a day  polyethylene glycol 3350 17 Gram(s) Oral daily  senna 2 Tablet(s) Oral at bedtime    MEDICATIONS  (PRN):  acetaminophen    Suspension .. 650 milliGRAM(s) Oral every 4 hours PRN Temp greater or equal to 38C (100.4F), Moderate Pain (4 - 6)  ondansetron Injectable 4 milliGRAM(s) IV Push every 6 hours PRN Nausea and/or Vomiting    Pertinent Labs: 10-18 @ 13:25: Na 138, BUN 16, Cr 0.40<L>, <H>, K+ 3.6, Phos --, Mg --, Alk Phos --, ALT/SGPT --, AST/SGOT --, HbA1c --    Finger Sticks:  POCT Blood Glucose.: 120 mg/dL (10-18 @ 09:33)      Skin per nursing documentation: intact  Edema: none at this time     Estimated Needs:   [x] no change since previous assessment      Previous Nutrition Diagnosis: mild malnutrition   Nutrition Diagnosis is:    New Nutrition Diagnosis: none at this time       Recommend  1) Consider liberalizing diet to regular at this time to promote PO intake.   2) Encouraged PO intake-small, frequent meals and nutrient dense snacks. In house, encouraged pt to order nutrient dense snacks c meals to consume in between meals to mimic small, frequent meals. Discussed protein rich foods available on menu. Discussed consuming protein first at meal times and then eating the other foods.     Monitoring and Evaluation:     Continue to monitor Nutritional intake, Tolerance to diet prescription, weights, labs, skin integrity    RD remains available upon request and will follow up per protocol  Nica Hagan MS RD CDN [facility],  #[phone] Nutrition Follow Up Note  Patient seen for: Malnutrition Follow Up     Interim events noted, chart reviewed. Pt c GIB, UTI, concern for metastatic disease, noted Oncology is following.     Source: pt (confused), son     Diet : lacto-ovo vegetarian, low sodium, consistent CHO diet     Patient reports: she's eating as tolerated. As per son, pt is eating much better today, around 50% of meals whereas yesterday she was barely eating. As per son, pt was consuming less and less over the last month or more due to pain from Shingles, states she was consuming about 2 meals per day. Reports he would prefer she does not consume dairy products but at this time point is accepting her eating whatever she would like as long as she eats. States pt is a lacto-ovo vegetarian and occasionally will consume chicken or turkey. Reports NKFA. States pt was supposed to take multivitamin and vitamin D at home but was not. Son reports pt was on PO medications at home for diabetes and was not checking her Finger sticks at home. Son reports pt weighed about 97 pounds about a month to a month ago and weighs about 91 pounds now, consistent c wt in house. Pt reports she did try the health shake and does like it, declined any other supplements at this time.     Daily Weight in k.5 (10-15), no new wt at this time; noted : 101.4-> 10/11: 91.7 pounds; nutrition focused physical exam deferred at this time as pt preferred to rest at this time    Pertinent Medications: MEDICATIONS  (STANDING):  amLODIPine   Tablet 2.5 milliGRAM(s) Oral daily  amLODIPine   Tablet 2.5 milliGRAM(s) Oral once  cefTRIAXone   IVPB 1000 milliGRAM(s) IV Intermittent every 24 hours  chlorhexidine 2% Cloths 1 Application(s) Topical daily  docusate sodium 100 milliGRAM(s) Oral daily  folic acid 1 milliGRAM(s) Oral daily  metoprolol succinate ER 25 milliGRAM(s) Oral daily  nortriptyline 25 milliGRAM(s) Oral daily  pantoprazole    Tablet 40 milliGRAM(s) Oral two times a day  polyethylene glycol 3350 17 Gram(s) Oral daily  senna 2 Tablet(s) Oral at bedtime    MEDICATIONS  (PRN):  acetaminophen    Suspension .. 650 milliGRAM(s) Oral every 4 hours PRN Temp greater or equal to 38C (100.4F), Moderate Pain (4 - 6)  ondansetron Injectable 4 milliGRAM(s) IV Push every 6 hours PRN Nausea and/or Vomiting    Pertinent Labs: 10-18 @ 13:25: Na 138, BUN 16, Cr 0.40<L>, <H>, K+ 3.6, Phos --, Mg --, Alk Phos --, ALT/SGPT --, AST/SGOT --, HbA1c --    Finger Sticks:  POCT Blood Glucose.: 120 mg/dL (10-18 @ 09:33)      Skin per nursing documentation: intact  Edema: none at this time     Estimated Needs:   [x] no change since previous assessment      Previous Nutrition Diagnosis: mild malnutrition   Nutrition Diagnosis continues at this time, addressed c gradually improving intake and acceptance of health shakes    New Nutrition Diagnosis: none at this time       Recommend  1) Consider liberalizing diet to regular at this time to promote PO intake considering appetite and age.   2) Encouraged PO intake-small, frequent meals and nutrient dense snacks. In house, encouraged pt to order nutrient dense snacks c meals to consume in between meals to mimic small, frequent meals. Discussed protein rich foods available on menu. Discussed consuming protein first at meal times and then eating the other foods.   3) Will continue to provide health shakes at this time.     Monitoring and Evaluation:     Continue to monitor Nutritional intake, Tolerance to diet prescription, weights, labs, skin integrity    RD remains available upon request and will follow up per protocol  Nica Hagan MS RD CDN [facility],  #[phone] Nutrition Follow Up Note  Patient seen for: Malnutrition Follow Up     Interim events noted, chart reviewed. Pt c GIB, UTI, concern for metastatic disease, noted Oncology is following.     Source: pt (confused), son     Diet : lacto-ovo vegetarian, low sodium, consistent CHO diet     Patient reports: she's eating as tolerated. As per son, pt is eating much better today, around 50% of meals whereas yesterday she was barely eating. As per son, pt was consuming less and less over the last month or more due to pain from Shingles, states she was consuming about 2 meals per day. Reports he would prefer she does not consume dairy products but at this time point is accepting her eating whatever she would like as long as she eats. States pt is a lacto-ovo vegetarian and occasionally will consume chicken or turkey. Reports NKFA. States pt was supposed to take multivitamin and vitamin D at home but was not. Son reports pt was on PO medications at home for diabetes and was not checking her Finger sticks at home. Son reports pt weighed about 97 pounds about a month to a month ago and weighs about 91 pounds now, consistent c wt in house. Pt reports she did try the health shake and does like it, declined any other supplements at this time.     Daily Weight in k.5 (10-15), no new wt at this time; noted : 101.4-> 10/11: 91.7 pounds; nutrition focused physical exam deferred at this time as pt preferred to rest at this time    Pertinent Medications: MEDICATIONS  (STANDING):  amLODIPine   Tablet 2.5 milliGRAM(s) Oral daily  amLODIPine   Tablet 2.5 milliGRAM(s) Oral once  cefTRIAXone   IVPB 1000 milliGRAM(s) IV Intermittent every 24 hours  chlorhexidine 2% Cloths 1 Application(s) Topical daily  docusate sodium 100 milliGRAM(s) Oral daily  folic acid 1 milliGRAM(s) Oral daily  metoprolol succinate ER 25 milliGRAM(s) Oral daily  nortriptyline 25 milliGRAM(s) Oral daily  pantoprazole    Tablet 40 milliGRAM(s) Oral two times a day  polyethylene glycol 3350 17 Gram(s) Oral daily  senna 2 Tablet(s) Oral at bedtime    MEDICATIONS  (PRN):  acetaminophen    Suspension .. 650 milliGRAM(s) Oral every 4 hours PRN Temp greater or equal to 38C (100.4F), Moderate Pain (4 - 6)  ondansetron Injectable 4 milliGRAM(s) IV Push every 6 hours PRN Nausea and/or Vomiting    Pertinent Labs: 10-18 @ 13:25: Na 138, BUN 16, Cr 0.40<L>, <H>, K+ 3.6, Phos --, Mg --, Alk Phos --, ALT/SGPT --, AST/SGOT --, HbA1c --    Finger Sticks:  POCT Blood Glucose.: 120 mg/dL (10-18 @ 09:33)      Skin per nursing documentation: intact  Edema: none at this time     Estimated Needs:   [x] no change since previous assessment      Previous Nutrition Diagnosis: mild malnutrition   Nutrition Diagnosis advanced to diagnosis below    New Nutrition Diagnosis: severe malnutrition (acute on chronic) related to prolonged suboptimal intake, pain as evidenced by reports of suboptimal PO intake and 6% involuntary wt loss in about 4-6 weeks      Recommend  1) Consider liberalizing diet to regular at this time to promote PO intake considering appetite and age.   2) Encouraged PO intake-small, frequent meals and nutrient dense snacks. In house, encouraged pt to order nutrient dense snacks c meals to consume in between meals to mimic small, frequent meals. Discussed protein rich foods available on menu. Discussed consuming protein first at meal times and then eating the other foods.   3) Will continue to provide health shakes at this time.     Monitoring and Evaluation:     Continue to monitor Nutritional intake, Tolerance to diet prescription, weights, labs, skin integrity    RD remains available upon request and will follow up per protocol  Nica Hagan MS RD CDN [facility],  #[phone]

## 2019-10-18 NOTE — PROGRESS NOTE ADULT - SUBJECTIVE AND OBJECTIVE BOX
Subjective: no new co except some confusion yesterday    Exam:  Gen: NAD  Neck: no JVD  Chest: normal shape and expansion  Heart: RRR with 2/6 systolic murmur  Lungs: CTAB  Abdomen: Soft, Significantly less distension tympanitic No tenderness no rebound, no guarding, no rigidity, no HSM, normoactive BS x 4  Ext: no CCE  Skin: Healing lesions on left torso from shingles          Vital Signs Last 24 Hrs  T(C): 37.7 (18 Oct 2019 00:13), Max: 37.7 (18 Oct 2019 00:13)  T(F): 99.8 (18 Oct 2019 00:13), Max: 99.8 (18 Oct 2019 00:13)  HR: 107 (18 Oct 2019 00:13) (93 - 107)  BP: 170/80 (18 Oct 2019 00:13) (166/75 - 170/80)  BP(mean): --  RR: 18 (18 Oct 2019 00:13) (18 - 18)  SpO2: 93% (18 Oct 2019 00:13) (93% - 94%)    Daily     Daily     I&O's Detail    17 Oct 2019 07:01  -  18 Oct 2019 07:00  --------------------------------------------------------  IN:    Oral Fluid: 400 mL    sodium chloride 0.9%.: 900 mL  Total IN: 1300 mL    OUT:    Indwelling Catheter - Urethral: 1650 mL    Voided: 800 mL  Total OUT: 2450 mL    Total NET: -1150 mL          Daily 41.5 (10-15 @ 15:13)    CAPILLARY BLOOD GLUCOSE      POCT Blood Glucose.: 135 mg/dL (17 Oct 2019 08:57)      MEDICATIONS  (STANDING):  amLODIPine   Tablet 2.5 milliGRAM(s) Oral daily  cefTRIAXone   IVPB 1000 milliGRAM(s) IV Intermittent every 24 hours  chlorhexidine 2% Cloths 1 Application(s) Topical daily  docusate sodium 100 milliGRAM(s) Oral daily  folic acid 1 milliGRAM(s) Oral daily  metoprolol succinate ER 25 milliGRAM(s) Oral daily  nortriptyline 25 milliGRAM(s) Oral daily  pantoprazole    Tablet 40 milliGRAM(s) Oral two times a day  polyethylene glycol 3350 17 Gram(s) Oral daily  senna 2 Tablet(s) Oral at bedtime  sodium chloride 0.9%. 1000 milliLiter(s) (75 mL/Hr) IV Continuous <Continuous>    MEDICATIONS  (PRN):  acetaminophen    Suspension .. 650 milliGRAM(s) Oral every 4 hours PRN Temp greater or equal to 38C (100.4F), Moderate Pain (4 - 6)  ondansetron Injectable 4 milliGRAM(s) IV Push every 6 hours PRN Nausea and/or Vomiting      Labs:                          10.9   14.78 )-----------( 425      ( 17 Oct 2019 11:02 )             32.6   baso x      eos x      imm gran x      lymph x      mono x      poly x                            8.1    14.01 )-----------( 442      ( 16 Oct 2019 15:19 )             24.5   baso x      eos x      imm gran x      lymph x      mono x      poly x                            6.9    12.98 )-----------( 393      ( 16 Oct 2019 11:14 )             21.3   baso x      eos x      imm gran x      lymph x      mono x      poly x                            7.7    15.93 )-----------( 443      ( 16 Oct 2019 08:15 )             24.2   baso x      eos x      imm gran x      lymph x      mono x      poly x                            9.6    21.04 )-----------( 625      ( 15 Oct 2019 09:12 )             30.5   baso 0.2    eos 0.0    imm gran 1.5    lymph 4.2    mono 7.3    poly 86.8       10-17    137  |  98  |  21  ----------------------------<  142<H>  3.5   |  27  |  0.48<L>    Ca    9.5      17 Oct 2019 11:02            Culture - Urine (10.13.19 @ 21:57)    -  Gentamicin: S <=4    -  Gentamicin: S <=4    -  Imipenem: S <=1    -  Imipenem: S <=1    -  Levofloxacin: R >4    -  Levofloxacin: R >4    -  Meropenem: S <=1    -  Meropenem: S <=1    -  Nitrofurantoin: S <=32 Should not be used to treat pyelonephritis    -  Nitrofurantoin: S <=32 Should not be used to treat pyelonephritis    -  Piperacillin/Tazobactam: S <=16    -  Piperacillin/Tazobactam: S <=16    -  Tigecycline: S <=2    -  Tigecycline: S <=2    -  Tobramycin: S <=4    -  Tobramycin: S <=4    -  Trimethoprim/Sulfamethoxazole: S <=2/38    -  Trimethoprim/Sulfamethoxazole: S <=2/38    -  Amikacin: S <=16    -  Amikacin: S <=16    -  Ampicillin: S <=8 These ampicillin results predict results for amoxicillin    -  Ampicillin: S <=8 These ampicillin results predict results for amoxicillin    -  Ampicillin/Sulbactam: S <=8/4 Enterobacter, Citrobacter, and Serratia may develop resistance during prolonged therapy (3-4 days)    -  Ampicillin/Sulbactam: S <=8/4 Enterobacter, Citrobacter, and Serratia may develop resistance during prolonged therapy (3-4 days)    -  Aztreonam: S <=4    -  Aztreonam: S <=4    -  Cefazolin: S <=8 (MIC_CL_COM_ENTERIC_CEFAZU) For uncomplicated UTI with K. pneumoniae, E. coli, or P. mirablis: DONNA <=16 is sensitive and DONNA >=32 is resistant. This also predicts results for oral agents cefaclor, cefdinir, cefpodoxime, cefprozil, cefuroxime axetil, cephalexin and locarbef for uncomplicated UTI. Note that some isolates may be susceptible to these agents while testing resistant to cefazolin.    -  Cefazolin: S <=8 (MIC_CL_COM_ENTERIC_CEFAZU) For uncomplicated UTI with K. pneumoniae, E. coli, or P. mirablis: DONNA <=16 is sensitive and DONNA >=32 is resistant. This also predicts results for oral agents cefaclor, cefdinir, cefpodoxime, cefprozil, cefuroxime axetil, cephalexin and locarbef for uncomplicated UTI. Note that some isolates may be susceptible to these agents while testing resistant to cefazolin.    -  Cefepime: S <=4    -  Cefepime: S <=4    -  Cefoxitin: S <=8    -  Cefoxitin: S <=8    -  Ceftriaxone: S <=1 Enterobacter, Citrobacter, and Serratia may develop resistance during prolonged therapy    -  Ceftriaxone: S <=1 Enterobacter, Citrobacter, and Serratia may develop resistance during prolonged therapy    -  Ciprofloxacin: R >2    -  Ciprofloxacin: R >2    Specimen Source: .Urine    Culture Results:   >100,000 CFU/ml Escherichia coli  Multiple Morphological Strains    Organism Identification: Escherichia coli  Escherichia coli    Organism: Escherichia coli    Organism: Escherichia coli    Method Type: DONNA    Method Type: DONNA    RIF: n/a  TCN: n/a  TGC: n/a  TOB: n/a  T/S: n/a  VAN: n/a  VORI: n/a          < from: MR Head No Cont (10.16.19 @ 17:59) >    INTERPRETATION:  Non-contrast MRI of the brain.    CLINICAL INDICATION: breast ca, with new osseous metastases, altered   mental status    TECHNIQUE:  Multiplanar, multisequence MR images of the brain were   obtained without the administration of intravenous contrast.      COMPARISON: CT brain 1/23/2018. Bone scan 10/16/2019.    FINDINGS:      Ventricles are mildly dilated, this may be on the basis of   age-appropriate atrophy. No mass effect, midline shift, vasogenic edema,   acute intracranial hemorrhage, or acute infarction. Chronic right   temporal infarct. Mild white matter microvascular ischemic disease.    Signal voids are seen within the major intracranial vessels consistent   with their patency.    The visualized paranasal sinuses and mastoid air cells are clear. The   orbits, sellar and suprasellar structures, and craniocervical junction   are unremarkable.    Calvarial and clival marrow signal grossly homogeneous on T1-weighted   images.    IMPRESSION:    No acute intracranial hemorrhage, mass effect, vasogenic edema, or   evidence of acute territorial infarct.    Chronic right temporal infarct.                      SHAR TELLO M.D., ATTENDING RADIOLOGIST    < end of copied text >    < from: NM SPECT/CT Bone/Joint (10.16.19 @ 13:31) >  EXAM:  NM BONE IMG WHOLE BODY                          EXAM:  NM BONE SPECT CT#                                PROCEDURE DATE:  10/16/2019          INTERPRETATION:  RADIOPHARMACEUTICAL: 20.5 mCi Tc-99m HDP, I.V.     CLINICAL INFORMATION: 89 year old female with breast carcinoma and   osseous lesions on CT; referred to evaluate for extent of osseous   metastases.    TECHNIQUE:  Anterior and posterior whole body images were obtained   approximately 2 hours following radiopharmaceutical administration.   Static images of the ribs in multiple views and SPECT/CT of the   hips/proximal femurs also were obtained. A computer workstation was used   to obtain composite images for anatomic correlation by precisely   overlying the SPECT and CT images to generate a fused SPECT/CT image. The   CT protocol was optimized for SPECT attenuation correction and to provide   anatomic detail for localization of SPECT abnormalities. The CT protocol   was not designed to produce and cannot replace state-of- the-art   diagnostic CT images with specific imaging protocols for different body   parts and indications.      COMPARISON: No previous bone scans were available for comparison.    FINDINGS: There are multiple foci of abnormal radiopharmaceutical   accumulation in the mid and lower thoracic spine, the right iliac bone   just medial to the sacrum, inferolateral aspect of the right iliac bone   just above the acetabulum, left lower iliac bone, lesser trochanter and   proximal shaft of the right femur.On the CT component of the examination   there is no evidence of cortical disruption involving the right femoral   abnormalities.    There is an area of decreased radiopharmaceutical uptake involving a left   lower rib.    Both kidneys are visualizedand are symmetric in appearance.    IMPRESSION: Abnormal bone scan. Metastatic disease involving the mid and   lower thoracic spine, left lower rib, the pelvis, and proximal right   femur.                      RANCHO CORRIGAN M.D., CHIEF OF NUCLEAR MEDICINE  This document has been electronically signed. Oct 16 2019  3:37PM    < end of copied text >    < from: CT Chest No Cont (10.16.19 @ 11:17) >  EXAM:  CT CHEST                            PROCEDURE DATE:  10/16/2019            INTERPRETATION:  Clinical information: Breast cancer.    CT scan of the chest was obtained without administration of intravenous   contrast.    An approximately 4.3 cm low-attenuation lesion is noted within the right   lobe of the thyroid gland.    Few small lymph nodes are present in the pretracheal space and the AP   window.     Heart is enlarged in size. Calcification of the aortic valve and the   coronary arteries is noted. No pericardial effusion is noted.     No endobronchial lesions are noted. Few branching tubular opacities   representing mucoid impacted distal small airways are noted in the right   middle lobe. Small bilateral pleural effusions are noted.    Below the diaphragm, visualized portions of the abdomen demonstrate   contrast within the visualized portion of the left renal cortex.     Lytic lesions are noted involving few of the ribs bilaterally.    Impression: Findings suggestive of bony metastasis.    Small bilateral pleural effusions.    There is a 4.3 cm low-attenuation lesion in the right lobe of the thyroid   gland. Further evaluation with ultrasound is recommended.                    JESÚS MOONEY M.D., ATTENDING RADIOLOGIST  This document has been electronically signed. Oct 16 2019 12:12PM    < end of copied text >      < from: CT Abdomen and Pelvis w/ IV Cont (10.14.19 @ 20:43) >  EXAM:  CT ABDOMEN AND PELVIS IC                            PROCEDURE DATE:  10/14/2019            INTERPRETATION:  CLINICAL INFORMATION: Abdominal pain.    COMPARISON: August 1, 2012.    PROCEDURE:   CT of the Abdomen and Pelvis was performed with intravenous contrast.   Intravenous contrast: 90 ml Omnipaque 350. 10 ml discarded.  Oral contrast: None.  Sagittal and coronal reformats were performed.    FINDINGS:.    LOWER CHEST: Small left pleural effusion. Subcentimeter subpleural left   lower lobe pulmonary nodule.    LIVER: Indeterminant cluster of hypodense lesions in the right hepatic   lobe on series 3 image 25.  BILE DUCTS: Normal caliber.  GALLBLADDER: Within normal limits.  SPLEEN: Within normal limits.   PANCREAS: Within normal limits.  ADRENALS: Within normal limits.   KIDNEYS/URETERS: Mild bilateral hydronephrosis. Cysts.     BLADDER: Within normal limits.   REPRODUCTIVE ORGANS: Within normal limits.    BOWEL: No bowel obstruction.  PERITONEUM: No ascites.   VESSELS:  Within normal limits.  RETROPERITONEUM/LYMPH NODES: No lymphadenopathy.     ABDOMINAL WALL: Within normal limits.  BONES: Scattered lytic osseous lesions.    IMPRESSION: Lytic osseous lesions suspicious for metastatic disease.    Mild bilateral hydronephrosis.    Cluster of hypodense lesions in the right hepatic lobe is indeterminate.                      JENNIFER STOCKTON M.D., ATTENDING RADIOLOGIST  This document has been electronically signed. Oct 15 2019  8:41AM    < end of copied text >

## 2019-10-19 DIAGNOSIS — I48.91 UNSPECIFIED ATRIAL FIBRILLATION: ICD-10-CM

## 2019-10-19 DIAGNOSIS — C50.919 MALIGNANT NEOPLASM OF UNSPECIFIED SITE OF UNSPECIFIED FEMALE BREAST: ICD-10-CM

## 2019-10-19 DIAGNOSIS — R10.9 UNSPECIFIED ABDOMINAL PAIN: ICD-10-CM

## 2019-10-19 LAB
ANION GAP SERPL CALC-SCNC: 9 MMOL/L — SIGNIFICANT CHANGE UP (ref 5–17)
BUN SERPL-MCNC: 21 MG/DL — SIGNIFICANT CHANGE UP (ref 7–23)
CALCIUM SERPL-MCNC: 9.4 MG/DL — SIGNIFICANT CHANGE UP (ref 8.4–10.5)
CHLORIDE SERPL-SCNC: 95 MMOL/L — LOW (ref 96–108)
CO2 SERPL-SCNC: 27 MMOL/L — SIGNIFICANT CHANGE UP (ref 22–31)
CREAT SERPL-MCNC: 0.44 MG/DL — LOW (ref 0.5–1.3)
GLUCOSE BLDC GLUCOMTR-MCNC: 154 MG/DL — HIGH (ref 70–99)
GLUCOSE BLDC GLUCOMTR-MCNC: 167 MG/DL — HIGH (ref 70–99)
GLUCOSE SERPL-MCNC: 149 MG/DL — HIGH (ref 70–99)
HCT VFR BLD CALC: 30.2 % — LOW (ref 34.5–45)
HGB BLD-MCNC: 9.6 G/DL — LOW (ref 11.5–15.5)
MCHC RBC-ENTMCNC: 27.9 PG — SIGNIFICANT CHANGE UP (ref 27–34)
MCHC RBC-ENTMCNC: 31.8 GM/DL — LOW (ref 32–36)
MCV RBC AUTO: 87.8 FL — SIGNIFICANT CHANGE UP (ref 80–100)
PLATELET # BLD AUTO: 445 K/UL — HIGH (ref 150–400)
POTASSIUM SERPL-MCNC: 3.9 MMOL/L — SIGNIFICANT CHANGE UP (ref 3.5–5.3)
POTASSIUM SERPL-SCNC: 3.9 MMOL/L — SIGNIFICANT CHANGE UP (ref 3.5–5.3)
RBC # BLD: 3.44 M/UL — LOW (ref 3.8–5.2)
RBC # FLD: 14.6 % — HIGH (ref 10.3–14.5)
SODIUM SERPL-SCNC: 131 MMOL/L — LOW (ref 135–145)
WBC # BLD: 19.06 K/UL — HIGH (ref 3.8–10.5)
WBC # FLD AUTO: 19.06 K/UL — HIGH (ref 3.8–10.5)

## 2019-10-19 PROCEDURE — 74018 RADEX ABDOMEN 1 VIEW: CPT | Mod: 26

## 2019-10-19 RX ORDER — FUROSEMIDE 40 MG
20 TABLET ORAL DAILY
Refills: 0 | Status: DISCONTINUED | OUTPATIENT
Start: 2019-10-19 | End: 2019-11-01

## 2019-10-19 RX ADMIN — CEFTRIAXONE 100 MILLIGRAM(S): 500 INJECTION, POWDER, FOR SOLUTION INTRAMUSCULAR; INTRAVENOUS at 18:16

## 2019-10-19 RX ADMIN — Medication 100 MILLIGRAM(S): at 13:30

## 2019-10-19 RX ADMIN — AMLODIPINE BESYLATE 5 MILLIGRAM(S): 2.5 TABLET ORAL at 06:26

## 2019-10-19 RX ADMIN — Medication 20 MILLIGRAM(S): at 13:44

## 2019-10-19 RX ADMIN — Medication 25 MILLIGRAM(S): at 06:26

## 2019-10-19 RX ADMIN — POLYETHYLENE GLYCOL 3350 17 GRAM(S): 17 POWDER, FOR SOLUTION ORAL at 13:30

## 2019-10-19 RX ADMIN — Medication 650 MILLIGRAM(S): at 18:15

## 2019-10-19 RX ADMIN — CHLORHEXIDINE GLUCONATE 1 APPLICATION(S): 213 SOLUTION TOPICAL at 13:31

## 2019-10-19 RX ADMIN — Medication 10 MILLIGRAM(S): at 17:47

## 2019-10-19 RX ADMIN — SENNA PLUS 2 TABLET(S): 8.6 TABLET ORAL at 22:00

## 2019-10-19 RX ADMIN — Medication 1 MILLIGRAM(S): at 13:30

## 2019-10-19 RX ADMIN — NORTRIPTYLINE HYDROCHLORIDE 25 MILLIGRAM(S): 10 CAPSULE ORAL at 22:00

## 2019-10-19 RX ADMIN — PANTOPRAZOLE SODIUM 40 MILLIGRAM(S): 20 TABLET, DELAYED RELEASE ORAL at 06:26

## 2019-10-19 NOTE — PROGRESS NOTE ADULT - SUBJECTIVE AND OBJECTIVE BOX
HPI:  89F w/ hx of DM2, Breast cancer, gastric ulcer, afib not on AC due to falls risk, HTN, HLD p/w worsening weakness and anemia. Pt was recently admitted for GIB roughly 3 weeks ago and upon endoscopy was found to have gastritis and duodenal ulcer. This was in setting of increased NSAID use to control pain from shingles exacerbation. Pt received transfusions and was discharged to follow up with GI regarding biopsy results. Son and pt informed of H. leidyri in biopsy. After lengthy family discussion, decision was made not to treat. Son noticed his mother getting weaker over past 1-2 weeks. Asked PMD to perform repeat blood test. Pt was found to have hgb of 7 and sent to ER for further evaluation. Pt denies any chest pain, SOB, palpitations. Endorses black mushy stool after starting iron in the hospital. Endorses waking up at night with sweats for many days. Denies any additional NSAID use.    In ER: Given acetaminophen (12 Oct 2019 02:58)      Interval Events  covering dr barnett, per s/o  xray came when i was at bedside, for abd pain for reported abd pain which pt now denies  Na lo, BP hi  meds, notes reviewed  pt is quite weak but responds appropriately, nad    MEDICATIONS  (STANDING):  amLODIPine   Tablet 5 milliGRAM(s) Oral daily  cefTRIAXone   IVPB 1000 milliGRAM(s) IV Intermittent every 24 hours  chlorhexidine 2% Cloths 1 Application(s) Topical daily  docusate sodium 100 milliGRAM(s) Oral daily  folic acid 1 milliGRAM(s) Oral daily  metoprolol succinate ER 25 milliGRAM(s) Oral daily  nortriptyline 25 milliGRAM(s) Oral daily  pantoprazole    Tablet 40 milliGRAM(s) Oral two times a day  polyethylene glycol 3350 17 Gram(s) Oral daily  senna 2 Tablet(s) Oral at bedtime    MEDICATIONS  (PRN):  acetaminophen    Suspension .. 650 milliGRAM(s) Oral every 4 hours PRN Temp greater or equal to 38C (100.4F), Moderate Pain (4 - 6)  ondansetron Injectable 4 milliGRAM(s) IV Push every 6 hours PRN Nausea and/or Vomiting  simethicone drops 80 milliGRAM(s) Oral two times a day PRN Gas      Patient is a 89y old  Female who presents with a chief complaint of Weakness and anemia (18 Oct 2019 15:45)      REVIEW OF SYSTEMS    General:nad denies  pain, says she ate but no food nearby, says sheis hungry  Skin/Breast:  Ophthalmologic:sees, no co no ch  ENMT:	hears , no co no ch  Respiratory and Thorax:no cough no sp no sob  Cardiovascular:no cp palp  Gastrointestinal:nio nvcd unsure  Genitourinary:  Musculoskeletal:	  Neurological:	  Psychiatric:	  Hematology/Lymphatics:	  Endocrine:	nio poly udd  Allergic/Immunologic:  AOSN	y      Vital Signs Last 24 Hrs  T(C): 36.3 (19 Oct 2019 06:49), Max: 36.8 (18 Oct 2019 23:57)  T(F): 97.3 (19 Oct 2019 06:49), Max: 98.3 (18 Oct 2019 23:57)  HR: 110 (19 Oct 2019 06:49) (91 - 111)  BP: 168/83 (19 Oct 2019 06:49) (153/75 - 168/83)  BP(mean): --  RR: 18 (19 Oct 2019 06:49) (16 - 18)  SpO2: 99% (19 Oct 2019 06:49) (94% - 99%)    PHYSICAL EXAM:    Constitutional:bp hi, quite weak,   H+N ncat    Eyes:micah cwnl  ENMT:hears , speaks little, very softly  Neck:  Breasts:  Back:  Respiratory:ctab no rrw  Cardiovascular:rrr  Gastrointestinal:sl dist, bs+, scant, tympanityic, nontender no g  Genitourinary:  Rectal:  Extremities:no cce  Vascular:  Neurological:  Skin:  Lymph Nodes:  Musculoskeletal:  Psychiatric:    LABS  CBC Full  -  ( 18 Oct 2019 13:25 )  WBC Count : 18.13 K/uL  RBC Count : 3.54 M/uL  Hemoglobin : 9.9 g/dL  Hematocrit : 29.8 %  Platelet Count - Automated : 438 K/uL  Mean Cell Volume : 84.2 fl  Mean Cell Hemoglobin : 28.0 pg  Mean Cell Hemoglobin Concentration : 33.2 gm/dL  Auto Neutrophil # : x  Auto Lymphocyte # : x  Auto Monocyte # : x  Auto Eosinophil # : x  Auto Basophil # : x  Auto Neutrophil % : x  Auto Lymphocyte % : x  Auto Monocyte % : x  Auto Eosinophil % : x  Auto Basophil % : x      10-19    131<L>  |  95<L>  |  21  ----------------------------<  149<H>  3.9   |  27  |  0.44<L>    Ca    9.4      19 Oct 2019 07:38    TPro  5.4<L>  /  Alb  1.9<L>  /  TBili  x   /  DBili  x   /  AST  x   /  ALT  x   /  AlkPhos  x   10-18          Imaging:    Xray:    Echo:    CT:    MRI:    Tele:    Orders:    ZAY Browne [phone]

## 2019-10-19 NOTE — PROVIDER CONTACT NOTE (OTHER) - ACTION/TREATMENT ORDERED:
ADRIANNA Martinez made aware. Pt sleeping comfortably . will recheck  v/s in 2 hours as per policy
Per PATRICIA bhakta, ok to draw T+S with AM labs, no needs for stat T+S or transfusion at this time.
NP made aware. Will repeat CBC in the morning

## 2019-10-19 NOTE — PROGRESS NOTE ADULT - ASSESSMENT
abd xray now  Lo Na, will give lasix as BP is hi too  BMP in am  can be oob if able  cont other meds and kaur as pklanned

## 2019-10-20 LAB
ANION GAP SERPL CALC-SCNC: 13 MMOL/L — SIGNIFICANT CHANGE UP (ref 5–17)
BUN SERPL-MCNC: 19 MG/DL — SIGNIFICANT CHANGE UP (ref 7–23)
CALCIUM SERPL-MCNC: 8.9 MG/DL — SIGNIFICANT CHANGE UP (ref 8.4–10.5)
CHLORIDE SERPL-SCNC: 97 MMOL/L — SIGNIFICANT CHANGE UP (ref 96–108)
CO2 SERPL-SCNC: 24 MMOL/L — SIGNIFICANT CHANGE UP (ref 22–31)
CREAT SERPL-MCNC: 0.43 MG/DL — LOW (ref 0.5–1.3)
CULTURE RESULTS: SIGNIFICANT CHANGE UP
GLUCOSE BLDC GLUCOMTR-MCNC: 131 MG/DL — HIGH (ref 70–99)
GLUCOSE BLDC GLUCOMTR-MCNC: 219 MG/DL — HIGH (ref 70–99)
GLUCOSE SERPL-MCNC: 129 MG/DL — HIGH (ref 70–99)
HCT VFR BLD CALC: 29.3 % — LOW (ref 34.5–45)
HGB BLD-MCNC: 9.3 G/DL — LOW (ref 11.5–15.5)
MCHC RBC-ENTMCNC: 27.9 PG — SIGNIFICANT CHANGE UP (ref 27–34)
MCHC RBC-ENTMCNC: 31.7 GM/DL — LOW (ref 32–36)
MCV RBC AUTO: 88 FL — SIGNIFICANT CHANGE UP (ref 80–100)
PLATELET # BLD AUTO: 475 K/UL — HIGH (ref 150–400)
POTASSIUM SERPL-MCNC: 3.9 MMOL/L — SIGNIFICANT CHANGE UP (ref 3.5–5.3)
POTASSIUM SERPL-SCNC: 3.9 MMOL/L — SIGNIFICANT CHANGE UP (ref 3.5–5.3)
PROCALCITONIN SERPL-MCNC: 0.22 NG/ML — HIGH (ref 0.02–0.1)
RBC # BLD: 3.33 M/UL — LOW (ref 3.8–5.2)
RBC # FLD: 14.7 % — HIGH (ref 10.3–14.5)
SODIUM SERPL-SCNC: 134 MMOL/L — LOW (ref 135–145)
SPECIMEN SOURCE: SIGNIFICANT CHANGE UP
WBC # BLD: 18.39 K/UL — HIGH (ref 3.8–10.5)
WBC # FLD AUTO: 18.39 K/UL — HIGH (ref 3.8–10.5)

## 2019-10-20 RX ORDER — SENNA PLUS 8.6 MG/1
2 TABLET ORAL
Refills: 0 | Status: DISCONTINUED | OUTPATIENT
Start: 2019-10-20 | End: 2019-11-04

## 2019-10-20 RX ORDER — POLYETHYLENE GLYCOL 3350 17 G/17G
17 POWDER, FOR SOLUTION ORAL
Refills: 0 | Status: DISCONTINUED | OUTPATIENT
Start: 2019-10-20 | End: 2019-11-04

## 2019-10-20 RX ORDER — POLYETHYLENE GLYCOL 3350 17 G/17G
17 POWDER, FOR SOLUTION ORAL DAILY
Refills: 0 | Status: DISCONTINUED | OUTPATIENT
Start: 2019-10-20 | End: 2019-10-20

## 2019-10-20 RX ADMIN — Medication 1 MILLIGRAM(S): at 14:42

## 2019-10-20 RX ADMIN — CEFTRIAXONE 100 MILLIGRAM(S): 500 INJECTION, POWDER, FOR SOLUTION INTRAMUSCULAR; INTRAVENOUS at 18:16

## 2019-10-20 RX ADMIN — SENNA PLUS 2 TABLET(S): 8.6 TABLET ORAL at 18:15

## 2019-10-20 RX ADMIN — PANTOPRAZOLE SODIUM 40 MILLIGRAM(S): 20 TABLET, DELAYED RELEASE ORAL at 05:47

## 2019-10-20 RX ADMIN — Medication 20 MILLIGRAM(S): at 05:49

## 2019-10-20 RX ADMIN — CHLORHEXIDINE GLUCONATE 1 APPLICATION(S): 213 SOLUTION TOPICAL at 14:41

## 2019-10-20 RX ADMIN — Medication 25 MILLIGRAM(S): at 05:47

## 2019-10-20 RX ADMIN — PANTOPRAZOLE SODIUM 40 MILLIGRAM(S): 20 TABLET, DELAYED RELEASE ORAL at 17:44

## 2019-10-20 RX ADMIN — NORTRIPTYLINE HYDROCHLORIDE 25 MILLIGRAM(S): 10 CAPSULE ORAL at 21:10

## 2019-10-20 RX ADMIN — AMLODIPINE BESYLATE 5 MILLIGRAM(S): 2.5 TABLET ORAL at 05:47

## 2019-10-20 NOTE — PROGRESS NOTE ADULT - SUBJECTIVE AND OBJECTIVE BOX
HPI:  89F w/ hx of DM2, Breast cancer, gastric ulcer, afib not on AC due to falls risk, HTN, HLD p/w worsening weakness and anemia. Pt was recently admitted for GIB roughly 3 weeks ago and upon endoscopy was found to have gastritis and duodenal ulcer. This was in setting of increased NSAID use to control pain from shingles exacerbation. Pt received transfusions and was discharged to follow up with GI regarding biopsy results. Son and pt informed of H. leidyri in biopsy. After lengthy family discussion, decision was made not to treat. Son noticed his mother getting weaker over past 1-2 weeks. Asked PMD to perform repeat blood test. Pt was found to have hgb of 7 and sent to ER for further evaluation. Pt denies any chest pain, SOB, palpitations. Endorses black mushy stool after starting iron in the hospital. Endorses waking up at night with sweats for many days. Denies any additional NSAID use.    In ER: Given acetaminophen (12 Oct 2019 02:58)      Interval Events  covering dr barnett  had abd Pain>XRay megan (poor technique)  no pain now but  no bm 3 days  in bed asleep 12 noon  started l;asix, Na sl better    MEDICATIONS  (STANDING):  amLODIPine   Tablet 5 milliGRAM(s) Oral daily  cefTRIAXone   IVPB 1000 milliGRAM(s) IV Intermittent every 24 hours  chlorhexidine 2% Cloths 1 Application(s) Topical daily  docusate sodium 100 milliGRAM(s) Oral daily  folic acid 1 milliGRAM(s) Oral daily  furosemide    Tablet 20 milliGRAM(s) Oral daily  metoprolol succinate ER 25 milliGRAM(s) Oral daily  nortriptyline 25 milliGRAM(s) Oral daily  pantoprazole    Tablet 40 milliGRAM(s) Oral two times a day  polyethylene glycol 3350 17 Gram(s) Oral daily  senna 2 Tablet(s) Oral at bedtime    MEDICATIONS  (PRN):  acetaminophen    Suspension .. 650 milliGRAM(s) Oral every 4 hours PRN Temp greater or equal to 38C (100.4F), Moderate Pain (4 - 6)  ondansetron Injectable 4 milliGRAM(s) IV Push every 6 hours PRN Nausea and/or Vomiting  simethicone drops 80 milliGRAM(s) Oral two times a day PRN Gas      Patient is a 89y old  Female who presents with a chief complaint of Weakness and anemia (19 Oct 2019 09:43)      REVIEW OF SYSTEMS    General:nad no pain no co doesnt have much to say  Skin/Breast:  Ophthalmologic:sees, no co no ch  ENMT:	hears swallows eats no co   Respiratory and Thorax:no cough no sp no sob  Cardiovascular:no pain no palp  Gastrointestinal:no nv "sensitive"  Genitourinary:no fdi  Musculoskeletal:	no pain  Neurological:	  Psychiatric:	  Hematology/Lymphatics:	  Endocrine:	  Allergic/Immunologic:  AOSN	y      Vital Signs Last 24 Hrs  T(C): 36.6 (20 Oct 2019 10:13), Max: 37.4 (20 Oct 2019 05:22)  T(F): 97.9 (20 Oct 2019 10:13), Max: 99.4 (20 Oct 2019 05:22)  HR: 107 (20 Oct 2019 10:13) (103 - 110)  BP: 154/78 (20 Oct 2019 10:13) (140/80 - 154/78)  BP(mean): --  RR: 18 (20 Oct 2019 10:13) (18 - 18)  SpO2: 96% (20 Oct 2019 10:13) (96% - 97%)    PHYSICAL EXAM:    Constitutional:BP better but still sl elev, nad no co  H+N ncat   Eyes:micah cwnl, wwears g;\lasses crooked  ENMT:om hy speaks  clearly but not much, hears ok  Neck:  Breasts:  Back:  Respiratory:ctab no rrw  Cardiovascular:rrr  Gastrointestinal:flat bs+ sl dist, soft nt no g  Genitourinary:  Rectal:  Extremities:no cce  Vascular:  Neurological:  Skin:dry , i  Lymph Nodes:  Musculoskeletal:  Psychiatric:    LABS  CBC Full  -  ( 20 Oct 2019 08:37 )  WBC Count : 18.39 K/uL  RBC Count : 3.33 M/uL  Hemoglobin : 9.3 g/dL  Hematocrit : 29.3 %  Platelet Count - Automated : 475 K/uL  Mean Cell Volume : 88.0 fl  Mean Cell Hemoglobin : 27.9 pg  Mean Cell Hemoglobin Concentration : 31.7 gm/dL  Auto Neutrophil # : x  Auto Lymphocyte # : x  Auto Monocyte # : x  Auto Eosinophil # : x  Auto Basophil # : x  Auto Neutrophil % : x  Auto Lymphocyte % : x  Auto Monocyte % : x  Auto Eosinophil % : x  Auto Basophil % : x      10-20    134<L>  |  97  |  19  ----------------------------<  129<H>  3.9   |  24  |  0.43<L>    Ca    8.9      20 Oct 2019 05:46            Imaging:    Xray:    Echo:    CT:    MRI:    Tele:    Orders:    ZAY Browne [phone]

## 2019-10-20 NOTE — PROGRESS NOTE ADULT - ASSESSMENT
wbc still elev   h/h ok  Na sl better  BP sl elev but better  on abx  BMP in am  Abd xr neg  order laxative  oob later

## 2019-10-21 DIAGNOSIS — H91.90 UNSPECIFIED HEARING LOSS, UNSPECIFIED EAR: ICD-10-CM

## 2019-10-21 LAB
ANION GAP SERPL CALC-SCNC: 10 MMOL/L — SIGNIFICANT CHANGE UP (ref 5–17)
BASOPHILS # BLD AUTO: 0 K/UL — SIGNIFICANT CHANGE UP (ref 0–0.2)
BASOPHILS NFR BLD AUTO: 0 % — SIGNIFICANT CHANGE UP (ref 0–2)
BUN SERPL-MCNC: 20 MG/DL — SIGNIFICANT CHANGE UP (ref 7–23)
CALCIUM SERPL-MCNC: 9.6 MG/DL — SIGNIFICANT CHANGE UP (ref 8.4–10.5)
CHLORIDE SERPL-SCNC: 95 MMOL/L — LOW (ref 96–108)
CO2 SERPL-SCNC: 28 MMOL/L — SIGNIFICANT CHANGE UP (ref 22–31)
CREAT SERPL-MCNC: 0.45 MG/DL — LOW (ref 0.5–1.3)
CULTURE RESULTS: SIGNIFICANT CHANGE UP
EOSINOPHIL # BLD AUTO: 0 K/UL — SIGNIFICANT CHANGE UP (ref 0–0.5)
EOSINOPHIL NFR BLD AUTO: 0 % — SIGNIFICANT CHANGE UP (ref 0–6)
GLUCOSE BLDC GLUCOMTR-MCNC: 192 MG/DL — HIGH (ref 70–99)
GLUCOSE SERPL-MCNC: 162 MG/DL — HIGH (ref 70–99)
HCT VFR BLD CALC: 27.2 % — LOW (ref 34.5–45)
HGB BLD-MCNC: 8.6 G/DL — LOW (ref 11.5–15.5)
INTERPRETATION 24H UR IFE-IMP: SIGNIFICANT CHANGE UP
INTERPRETATION 24H UR IFE-IMP: SIGNIFICANT CHANGE UP
LYMPHOCYTES # BLD AUTO: 1.65 K/UL — SIGNIFICANT CHANGE UP (ref 1–3.3)
LYMPHOCYTES # BLD AUTO: 10.4 % — LOW (ref 13–44)
MANUAL SMEAR VERIFICATION: SIGNIFICANT CHANGE UP
MCHC RBC-ENTMCNC: 27.5 PG — SIGNIFICANT CHANGE UP (ref 27–34)
MCHC RBC-ENTMCNC: 31.6 GM/DL — LOW (ref 32–36)
MCV RBC AUTO: 86.9 FL — SIGNIFICANT CHANGE UP (ref 80–100)
MONOCYTES # BLD AUTO: 0.82 K/UL — SIGNIFICANT CHANGE UP (ref 0–0.9)
MONOCYTES NFR BLD AUTO: 5.2 % — SIGNIFICANT CHANGE UP (ref 2–14)
MYELOCYTES NFR BLD: 0.9 % — HIGH (ref 0–0)
NEUTROPHILS # BLD AUTO: 13.23 K/UL — HIGH (ref 1.8–7.4)
NEUTROPHILS NFR BLD AUTO: 83.5 % — HIGH (ref 43–77)
PLAT MORPH BLD: NORMAL — SIGNIFICANT CHANGE UP
PLATELET # BLD AUTO: 467 K/UL — HIGH (ref 150–400)
POTASSIUM SERPL-MCNC: 3.9 MMOL/L — SIGNIFICANT CHANGE UP (ref 3.5–5.3)
POTASSIUM SERPL-SCNC: 3.9 MMOL/L — SIGNIFICANT CHANGE UP (ref 3.5–5.3)
RBC # BLD: 3.13 M/UL — LOW (ref 3.8–5.2)
RBC # FLD: 14.6 % — HIGH (ref 10.3–14.5)
RBC BLD AUTO: NORMAL — SIGNIFICANT CHANGE UP
SODIUM SERPL-SCNC: 133 MMOL/L — LOW (ref 135–145)
SPECIMEN SOURCE: SIGNIFICANT CHANGE UP
WBC # BLD: 15.84 K/UL — HIGH (ref 3.8–10.5)
WBC # FLD AUTO: 15.84 K/UL — HIGH (ref 3.8–10.5)

## 2019-10-21 PROCEDURE — 99233 SBSQ HOSP IP/OBS HIGH 50: CPT

## 2019-10-21 PROCEDURE — 99232 SBSQ HOSP IP/OBS MODERATE 35: CPT

## 2019-10-21 PROCEDURE — 99222 1ST HOSP IP/OBS MODERATE 55: CPT

## 2019-10-21 RX ORDER — SODIUM CHLORIDE 9 MG/ML
1000 INJECTION INTRAMUSCULAR; INTRAVENOUS; SUBCUTANEOUS
Refills: 0 | Status: DISCONTINUED | OUTPATIENT
Start: 2019-10-21 | End: 2019-10-29

## 2019-10-21 RX ADMIN — CEFTRIAXONE 100 MILLIGRAM(S): 500 INJECTION, POWDER, FOR SOLUTION INTRAMUSCULAR; INTRAVENOUS at 18:26

## 2019-10-21 RX ADMIN — PANTOPRAZOLE SODIUM 40 MILLIGRAM(S): 20 TABLET, DELAYED RELEASE ORAL at 05:59

## 2019-10-21 RX ADMIN — AMLODIPINE BESYLATE 5 MILLIGRAM(S): 2.5 TABLET ORAL at 05:59

## 2019-10-21 RX ADMIN — NORTRIPTYLINE HYDROCHLORIDE 25 MILLIGRAM(S): 10 CAPSULE ORAL at 22:35

## 2019-10-21 RX ADMIN — CHLORHEXIDINE GLUCONATE 1 APPLICATION(S): 213 SOLUTION TOPICAL at 14:04

## 2019-10-21 RX ADMIN — POLYETHYLENE GLYCOL 3350 17 GRAM(S): 17 POWDER, FOR SOLUTION ORAL at 05:59

## 2019-10-21 RX ADMIN — Medication 25 MILLIGRAM(S): at 05:59

## 2019-10-21 RX ADMIN — SODIUM CHLORIDE 75 MILLILITER(S): 9 INJECTION INTRAMUSCULAR; INTRAVENOUS; SUBCUTANEOUS at 19:58

## 2019-10-21 RX ADMIN — Medication 100 MILLIGRAM(S): at 14:06

## 2019-10-21 RX ADMIN — Medication 1 MILLIGRAM(S): at 14:05

## 2019-10-21 RX ADMIN — PANTOPRAZOLE SODIUM 40 MILLIGRAM(S): 20 TABLET, DELAYED RELEASE ORAL at 19:25

## 2019-10-21 RX ADMIN — SENNA PLUS 2 TABLET(S): 8.6 TABLET ORAL at 06:01

## 2019-10-21 RX ADMIN — SENNA PLUS 2 TABLET(S): 8.6 TABLET ORAL at 19:25

## 2019-10-21 RX ADMIN — Medication 20 MILLIGRAM(S): at 05:59

## 2019-10-21 NOTE — PROGRESS NOTE ADULT - SUBJECTIVE AND OBJECTIVE BOX
Subjective: no new co except some confusion yesterday    Exam:  Gen: NAD  Neck: no JVD  Chest: normal shape and expansion  Heart: RRR with 2/6 systolic murmur  Lungs: CTAB  Abdomen: Soft, Significantly less distension tympanitic No tenderness no rebound, no guarding, no rigidity, no HSM, normoactive BS x 4  Ext: no CCE  Skin: Healing lesions on left torso from shingles        Vital Signs Last 24 Hrs  T(C): 37.3 (21 Oct 2019 05:04), Max: 37.3 (20 Oct 2019 23:15)  T(F): 99.1 (21 Oct 2019 05:04), Max: 99.2 (20 Oct 2019 23:15)  HR: 107 (21 Oct 2019 05:04) (107 - 108)  BP: 164/77 (21 Oct 2019 05:04) (145/75 - 164/77)  BP(mean): --  RR: 18 (21 Oct 2019 05:04) (18 - 18)  SpO2: 95% (21 Oct 2019 05:04) (95% - 97%)    Daily     Daily     I&O's Detail    20 Oct 2019 07:01  -  21 Oct 2019 07:00  --------------------------------------------------------  IN:    IV PiggyBack: 50 mL  Total IN: 50 mL    OUT:    Indwelling Catheter - Urethral: 1000 mL    Intermittent Catheterization - Urethral: 350 mL  Total OUT: 1350 mL    Total NET: -1300 mL          Daily     CAPILLARY BLOOD GLUCOSE      POCT Blood Glucose.: 219 mg/dL (20 Oct 2019 13:01)  POCT Blood Glucose.: 131 mg/dL (20 Oct 2019 08:05)      MEDICATIONS  (STANDING):  amLODIPine   Tablet 5 milliGRAM(s) Oral daily  cefTRIAXone   IVPB 1000 milliGRAM(s) IV Intermittent every 24 hours  chlorhexidine 2% Cloths 1 Application(s) Topical daily  docusate sodium 100 milliGRAM(s) Oral daily  folic acid 1 milliGRAM(s) Oral daily  furosemide    Tablet 20 milliGRAM(s) Oral daily  metoprolol succinate ER 25 milliGRAM(s) Oral daily  nortriptyline 25 milliGRAM(s) Oral daily  pantoprazole    Tablet 40 milliGRAM(s) Oral two times a day  polyethylene glycol 3350 17 Gram(s) Oral two times a day  senna 2 Tablet(s) Oral two times a day    MEDICATIONS  (PRN):  acetaminophen    Suspension .. 650 milliGRAM(s) Oral every 4 hours PRN Temp greater or equal to 38C (100.4F), Moderate Pain (4 - 6)  ondansetron Injectable 4 milliGRAM(s) IV Push every 6 hours PRN Nausea and/or Vomiting  simethicone drops 80 milliGRAM(s) Oral two times a day PRN Gas      Labs:    10-20 @ 05:46    134<L>  |  97  |  19  ----------------------------<  129<H>  3.9   |  24  |  0.43<L>    10-19 @ 07:38    131<L>  |  95<L>  |  21  ----------------------------<  149<H>  3.9   |  27  |  0.44<L>    10-18 @ 13:25    138  |  98  |  16  ----------------------------<  125<H>  3.6   |  26  |  0.40<L>    10-17 @ 11:02    137  |  98  |  21  ----------------------------<  142<H>  3.5   |  27  |  0.48<L>      Ca    8.9      10-20 @ 05:46  Ca    9.4      10-19 @ 07:38  Ca    9.2      10-18 @ 13:25  Ca    9.5      10-17 @ 11:02                        9.3    18.39 )-----------( 475      ( 20 Oct 2019 08:37 )             29.3   baso x      eos x      imm gran x      lymph x      mono x      poly x                            9.6    19.06 )-----------( 445      ( 19 Oct 2019 10:33 )             30.2   baso x      eos x      imm gran x      lymph x      mono x      poly x                            9.9    18.13 )-----------( 438      ( 18 Oct 2019 13:25 )             29.8   baso x      eos x      imm gran x      lymph x      mono x      poly x        TPro  5.4<L>  /  Alb  1.9<L>  /  TBili  x   /  DBili  x   /  AST  x   /  ALT  x   /  AlkPhos  x   10-18 @ 10:07 Subjective: Still some mild intermittent confusion complains of some abdominal discomfort on occasion still some mild distention has been getting up out of bed into the chair somewhat no fever    Exam:  Gen: NAD  Neck: no JVD  Chest: normal shape and expansion  Heart: RRR with 2/6 systolic murmur  Lungs: CTAB  Abdomen: Soft, Minimally distended and soft with mild diffuse abdominal tenderness no rebound tenderness, no guarding, no rigidity, no HSM, normoactive BS x 4  Ext: no CCE  Skin: Healing lesions on left torso from shingles        Vital Signs Last 24 Hrs  T(C): 37.3 (21 Oct 2019 05:04), Max: 37.3 (20 Oct 2019 23:15)  T(F): 99.1 (21 Oct 2019 05:04), Max: 99.2 (20 Oct 2019 23:15)  HR: 107 (21 Oct 2019 05:04) (107 - 108)  BP: 164/77 (21 Oct 2019 05:04) (145/75 - 164/77)  BP(mean): --  RR: 18 (21 Oct 2019 05:04) (18 - 18)  SpO2: 95% (21 Oct 2019 05:04) (95% - 97%)    Daily     Daily     I&O's Detail    20 Oct 2019 07:01  -  21 Oct 2019 07:00  --------------------------------------------------------  IN:    IV PiggyBack: 50 mL  Total IN: 50 mL    OUT:    Indwelling Catheter - Urethral: 1000 mL    Intermittent Catheterization - Urethral: 350 mL  Total OUT: 1350 mL    Total NET: -1300 mL          Daily     CAPILLARY BLOOD GLUCOSE      POCT Blood Glucose.: 219 mg/dL (20 Oct 2019 13:01)  POCT Blood Glucose.: 131 mg/dL (20 Oct 2019 08:05)      MEDICATIONS  (STANDING):  amLODIPine   Tablet 5 milliGRAM(s) Oral daily  cefTRIAXone   IVPB 1000 milliGRAM(s) IV Intermittent every 24 hours  chlorhexidine 2% Cloths 1 Application(s) Topical daily  docusate sodium 100 milliGRAM(s) Oral daily  folic acid 1 milliGRAM(s) Oral daily  furosemide    Tablet 20 milliGRAM(s) Oral daily  metoprolol succinate ER 25 milliGRAM(s) Oral daily  nortriptyline 25 milliGRAM(s) Oral daily  pantoprazole    Tablet 40 milliGRAM(s) Oral two times a day  polyethylene glycol 3350 17 Gram(s) Oral two times a day  senna 2 Tablet(s) Oral two times a day    MEDICATIONS  (PRN):  acetaminophen    Suspension .. 650 milliGRAM(s) Oral every 4 hours PRN Temp greater or equal to 38C (100.4F), Moderate Pain (4 - 6)  ondansetron Injectable 4 milliGRAM(s) IV Push every 6 hours PRN Nausea and/or Vomiting  simethicone drops 80 milliGRAM(s) Oral two times a day PRN Gas      Labs:    10-20 @ 05:46    134<L>  |  97  |  19  ----------------------------<  129<H>  3.9   |  24  |  0.43<L>    10-19 @ 07:38    131<L>  |  95<L>  |  21  ----------------------------<  149<H>  3.9   |  27  |  0.44<L>    10-18 @ 13:25    138  |  98  |  16  ----------------------------<  125<H>  3.6   |  26  |  0.40<L>    10-17 @ 11:02    137  |  98  |  21  ----------------------------<  142<H>  3.5   |  27  |  0.48<L>      Ca    8.9      10-20 @ 05:46  Ca    9.4      10-19 @ 07:38  Ca    9.2      10-18 @ 13:25  Ca    9.5      10-17 @ 11:02                        9.3    18.39 )-----------( 475      ( 20 Oct 2019 08:37 )             29.3   baso x      eos x      imm gran x      lymph x      mono x      poly x                            9.6    19.06 )-----------( 445      ( 19 Oct 2019 10:33 )             30.2   baso x      eos x      imm gran x      lymph x      mono x      poly x                            9.9    18.13 )-----------( 438      ( 18 Oct 2019 13:25 )             29.8   baso x      eos x      imm gran x      lymph x      mono x      poly x        TPro  5.4<L>  /  Alb  1.9<L>  /  TBili  x   /  DBili  x   /  AST  x   /  ALT  x   /  AlkPhos  x   10-18 @ 10:07

## 2019-10-21 NOTE — PROGRESS NOTE ADULT - SUBJECTIVE AND OBJECTIVE BOX
Chief Complaint: fu    History of Present Illness: laying in chair; poor historian, repeatedly says "im so tired"; ROS limited, occasional abdominal pain, no n/v, no leg pain, no reported bleeding      MEDICATIONS  (STANDING):  amLODIPine   Tablet 5 milliGRAM(s) Oral daily  cefTRIAXone   IVPB 1000 milliGRAM(s) IV Intermittent every 24 hours  chlorhexidine 2% Cloths 1 Application(s) Topical daily  docusate sodium 100 milliGRAM(s) Oral daily  folic acid 1 milliGRAM(s) Oral daily  furosemide    Tablet 20 milliGRAM(s) Oral daily  metoprolol succinate ER 25 milliGRAM(s) Oral daily  nortriptyline 25 milliGRAM(s) Oral daily  pantoprazole    Tablet 40 milliGRAM(s) Oral two times a day  polyethylene glycol 3350 17 Gram(s) Oral two times a day  senna 2 Tablet(s) Oral two times a day  sodium chloride 0.9%. 1000 milliLiter(s) (75 mL/Hr) IV Continuous <Continuous>    MEDICATIONS  (PRN):  acetaminophen    Suspension .. 650 milliGRAM(s) Oral every 4 hours PRN Temp greater or equal to 38C (100.4F), Moderate Pain (4 - 6)  ondansetron Injectable 4 milliGRAM(s) IV Push every 6 hours PRN Nausea and/or Vomiting  simethicone drops 80 milliGRAM(s) Oral two times a day PRN Gas      Allergies    penicillin (Unknown)    Intolerances        Vital Signs Last 24 Hrs  T(C): 37.6 (21 Oct 2019 08:59), Max: 37.6 (21 Oct 2019 08:59)  T(F): 99.6 (21 Oct 2019 08:59), Max: 99.6 (21 Oct 2019 08:59)  HR: 100 (21 Oct 2019 08:59) (100 - 108)  BP: 150/80 (21 Oct 2019 08:59) (145/75 - 164/77)  BP(mean): --  RR: 18 (21 Oct 2019 08:59) (18 - 18)  SpO2: 95% (21 Oct 2019 08:59) (95% - 97%)    PHYSICAL EXAM  General: adult in NAD  HEENT: clear oropharynx, anicteric sclera, pink conjunctiva  Neck: supple  CV: normal S1/S2  Lungs: decreased BS, poor effort  Abdomen: soft non-tender sl distended, positive bowel sounds  Ext: no clubbing cyanosis or edema  Skin: no rashes and no petechiae  Lymph Nodes: No LAD in axillae, groin, neck  Neuro: lethargic    LABS:                          8.6    15.84 )-----------( 467      ( 21 Oct 2019 09:13 )             27.2         Mean Cell Volume : 86.9 fl  Mean Cell Hemoglobin : 27.5 pg  Mean Cell Hemoglobin Concentration : 31.6 gm/dL  Auto Neutrophil # : 13.23 K/uL  Auto Lymphocyte # : 1.65 K/uL  Auto Monocyte # : 0.82 K/uL  Auto Eosinophil # : 0.00 K/uL  Auto Basophil # : 0.00 K/uL  Auto Neutrophil % : 83.5 %  Auto Lymphocyte % : 10.4 %  Auto Monocyte % : 5.2 %  Auto Eosinophil % : 0.0 %  Auto Basophil % : 0.0 %      Serial CBC's  10-21 @ 09:13  Hct-27.2 / Hgb-8.6 / Plat-467 / RBC-3.13 / WBC-15.84  Serial CBC's  10-20 @ 08:37  Hct-29.3 / Hgb-9.3 / Plat-475 / RBC-3.33 / WBC-18.39  Serial CBC's  10-19 @ 10:33  Hct-30.2 / Hgb-9.6 / Plat-445 / RBC-3.44 / WBC-19.06  Serial CBC's  10-18 @ 13:25  Hct-29.8 / Hgb-9.9 / Plat-438 / RBC-3.54 / WBC-18.13      10-21    133<L>  |  95<L>  |  20  ----------------------------<  162<H>  3.9   |  28  |  0.45<L>    Ca    9.6      21 Oct 2019 07:41            Ferritin, Serum: 1918 ng/mL (10-16 @ 10:04)  Vitamin B12, Serum: 518 pg/mL (10-16 @ 10:04)  Folate, Serum: 4.5 ng/mL (10-16 @ 10:04)  Iron - Total Binding Capacity.: 120 ug/dL (10-16 @ 10:04)      Serum Protein Electrophoresis Interp: Gamma-Migrating Paraprotein Identified (10-18 @ 10:07)  Immunofixation, Serum:   IgG Lambda Band Identified    Reference Range: None Detected (10-18 @ 10:07)  GUILLERMINA Kappa: 2.83 mg/dL (10-18 @ 10:07)  GUILLERMINA Lambda: 2.36 mg/dL (10-18 @ 10:07)          Radiology:      < from: MR Lumbar Spine w/wo IV Cont (10.18.19 @ 21:47) >  Impression: Evidence of abnormal signal inthe T8 and T9 vertebral bodies   without involvement of the disc space or extension into the spinal canal   with low signal T1 hyperintense signal T2 and enhancement. The abnormal   signal does appear to involve the posterior elements including the lamina   and spinous processes. Findings appear to correspond with region of   abnormality identified on the patient's prior bone scan 10/16/2019 and is   suspicious for metastatic disease. There is also incomplete visualization   of a right iliac lesion identified on the bone scan as well. Evidence of   what appears to be degenerative changes with endplate abnormality at the   T12-L1 level.

## 2019-10-21 NOTE — CONSULT NOTE ADULT - ASSESSMENT
89 yr old female with hx breast cancer ,recent herpes zoster and metastatic disease with worsening hearing loss per family member. To be evaluated this AM 89 yr old female with hx breast cancer ,recent herpes zoster and metastatic disease with worsening hearing loss per family member. Pt with worsening age related hearing loss. No acute obstruction/cerumen impaction

## 2019-10-21 NOTE — PROGRESS NOTE ADULT - SUBJECTIVE AND OBJECTIVE BOX
CC: F/U for UTI    Saw/spoke to patient. No new complaints. Doing well. Still fatigued.    Allergies  penicillin (Unknown)    ANTIMICROBIALS:  cefTRIAXone   IVPB 1000 every 24 hours    PE:    Vital Signs Last 24 Hrs  T(C): 37.6 (21 Oct 2019 08:59), Max: 37.6 (21 Oct 2019 08:59)  T(F): 99.6 (21 Oct 2019 08:59), Max: 99.6 (21 Oct 2019 08:59)  HR: 100 (21 Oct 2019 08:59) (100 - 108)  BP: 150/80 (21 Oct 2019 08:59) (145/75 - 164/77)  RR: 18 (21 Oct 2019 08:59) (18 - 18)  SpO2: 95% (21 Oct 2019 08:59) (95% - 97%)    Gen: AOx3, NAD, non-toxic  CV: S1+S2 normal, nontachycardic  Resp: Clear bilat, no resp distress, no crackles/wheezes  Abd: Soft, nontender, +BS  Ext: No LE edema, no wounds    LABS:                        8.6    15.84 )-----------( 467      ( 21 Oct 2019 09:13 )             27.2     10-21    133<L>  |  95<L>  |  20  ----------------------------<  162<H>  3.9   |  28  |  0.45<L>    Ca    9.6      21 Oct 2019 07:41    MICROBIOLOGY:    .Blood  10-20-19   No growth to date    .Urine  10-16-19   <10,000 CFU/mL Normal Urogenital Mily  --  --    .Urine  10-13-19   >100,000 CFU/ml Escherichia coli  Multiple Morphological Strains  --  Escherichia coli  Escherichia coli    (otherwise reviewed)    RADIOLOGY:    AXR 10/19:    Findings: The bowel gas pattern is nonobstructive. Patient's hand   obscures the right lower quadrant. No pathologic calcifications are seen.    Impression: Nonobstructive bowel gas pattern.

## 2019-10-21 NOTE — CHART NOTE - NSCHARTNOTEFT_GEN_A_CORE
Nutrition Follow Up Note  Patient seen for: Malnutrition Follow Up     Interim events noted, chart reviewed. Pt admitted c GIB, concern for malignancy, likely metastatic breast cancer. Noted pt c hyponatremia.     Source: pt (lethargic), RN     Diet : lacto-ovo vegetarian, low sodium, consistent CHO diet     Patient reports: she is okay, states she likes the health shakes, observed pt consumed one at lunch today and had some green beans. RN reports pt consumed some of breakfast this morning as well. Pt denies need for anything else at this time, would like to rest. Noted per flow sheets, pt has been consuming about 25% of most meals. Noted pt had BM 10/20.     Daily Weight in k.5 (10-15), no new wt at this time    Pertinent Medications: MEDICATIONS  (STANDING):  amLODIPine   Tablet 5 milliGRAM(s) Oral daily  cefTRIAXone   IVPB 1000 milliGRAM(s) IV Intermittent every 24 hours  chlorhexidine 2% Cloths 1 Application(s) Topical daily  docusate sodium 100 milliGRAM(s) Oral daily  folic acid 1 milliGRAM(s) Oral daily  furosemide    Tablet 20 milliGRAM(s) Oral daily  metoprolol succinate ER 25 milliGRAM(s) Oral daily  nortriptyline 25 milliGRAM(s) Oral daily  pantoprazole    Tablet 40 milliGRAM(s) Oral two times a day  polyethylene glycol 3350 17 Gram(s) Oral two times a day  senna 2 Tablet(s) Oral two times a day  sodium chloride 0.9%. 1000 milliLiter(s) (75 mL/Hr) IV Continuous <Continuous>    MEDICATIONS  (PRN):  acetaminophen    Suspension .. 650 milliGRAM(s) Oral every 4 hours PRN Temp greater or equal to 38C (100.4F), Moderate Pain (4 - 6)  ondansetron Injectable 4 milliGRAM(s) IV Push every 6 hours PRN Nausea and/or Vomiting  simethicone drops 80 milliGRAM(s) Oral two times a day PRN Gas    Pertinent Labs: 10-21 @ 07:41: Na 133<L>, BUN 20, Cr 0.45<L>, <H>, K+ 3.9, Phos --, Mg --, Alk Phos --, ALT/SGPT --, AST/SGOT --, HbA1c --    Finger Sticks:  POCT Blood Glucose.: 192 mg/dL (10-21 @ 13:11)      Skin per nursing documentation: intact  Edema: none at this time     Estimated Needs:   [x] no change since previous assessment      Previous Nutrition Diagnosis: severe malnutrition   Nutrition Diagnosis continues, addressed c gradually improving PO intake, health shakes, care plan in progress     New Nutrition Diagnosis: none at this time       Recommend  1) Would consider liberalizing diet as able to regular diet, noted pt c elevated Finger sticks of late, however, given decreased PO intake, consider liberalizing diet, would still consider changing diet.   2) Will continue to provide health shakes x 2 daily.   3) Encouraged PO intake-small, frequent meals and nutrient dense snacks. In house, encouraged pt to order nutrient dense snacks c meals to consume in between meals to mimic small, frequent meals. Discussed protein rich foods available on menu. Discussed consuming protein first at meal times and then eating the other foods.   Monitoring and Evaluation:     Continue to monitor Nutritional intake, Tolerance to diet prescription, weights, labs, skin integrity    RD remains available upon request and will follow up per protocol  Nica Hagan MS RD CDN [facility],  #[phone]

## 2019-10-21 NOTE — PROGRESS NOTE ADULT - ASSESSMENT
89F w/ hx of DM2, Breast cancer, gastric ulcer, afib not on AC due to falls risk, HTN, HLD p/w worsening weakness and anemia, treated for UTI, found to have lytic lesions on CT abdomen    #lytic lesions on CT- with history of Breast cancer Stage 1c mucinous, ER+/NM+/Zom0kgv - s/p lumpectomy- patient had refused any adjuvant hormonal treatment; recently CA 27-29 with slight trend up though still normal; mammogram/US 10/30/18- negative  - likely metastatic breast cancer  - CT chest without lung lesions; MRI brain no mets; bone scan with multiple mets- mid/lower thoracic spine, left lower rib, pelvis, proximal right femur without cortical disruption  - MRI spine pending  - SPEP/IF pending- K/L ratio is normal, M spike with 0.2 g/dl gamma migrating paraprotein- VERY small M spike, likely incidental finding  - ideally obtain biopsy for further eval/diagnosis but consider empiric rx with aromatase inhibitor in this lady with advanced age and now decreasing status-- possible plan for home hospice per notes    #Anemia, normocytic- likely multifactorial- AOCD + GI loss with guaiac positive stool  - recent EGD with gastritis/duodenal ulcer, guaiac + this admission  - s/p 1 unit PRBC 10/11  - iron studies AOCD; B12 level normal; Folate slightly low- on replacement  - Hg lower today- monitor    #thrombocytosis- likely reactive in setting of acute illness/ UTI/ recent GIB  - platelet count had been normal 2018, monitor    #hypercalcemia- likely secondary to malignancy +/- volume depletion  - now off HCTZ  - on hydration  - PTH low, PTHrP pending  - Ca improved    #ID- on Abx for UTI

## 2019-10-21 NOTE — PROGRESS NOTE ADULT - SUBJECTIVE AND OBJECTIVE BOX
INTERVAL HPI/OVERNIGHT EVENTS:  Patient was OOB to chair with son bedside, reports she was hungry . Lunch tray noted bedside, son to assist . BM last night per son   ,   MEDICATIONS  (STANDING):  amLODIPine   Tablet 5 milliGRAM(s) Oral daily  cefTRIAXone   IVPB 1000 milliGRAM(s) IV Intermittent every 24 hours  chlorhexidine 2% Cloths 1 Application(s) Topical daily  docusate sodium 100 milliGRAM(s) Oral daily  folic acid 1 milliGRAM(s) Oral daily  furosemide    Tablet 20 milliGRAM(s) Oral daily  metoprolol succinate ER 25 milliGRAM(s) Oral daily  nortriptyline 25 milliGRAM(s) Oral daily  pantoprazole    Tablet 40 milliGRAM(s) Oral two times a day  polyethylene glycol 3350 17 Gram(s) Oral two times a day  senna 2 Tablet(s) Oral two times a day  sodium chloride 0.9%. 1000 milliLiter(s) (75 mL/Hr) IV Continuous <Continuous>    MEDICATIONS  (PRN):  acetaminophen    Suspension .. 650 milliGRAM(s) Oral every 4 hours PRN Temp greater or equal to 38C (100.4F), Moderate Pain (4 - 6)  ondansetron Injectable 4 milliGRAM(s) IV Push every 6 hours PRN Nausea and/or Vomiting  simethicone drops 80 milliGRAM(s) Oral two times a day PRN Gas      Allergies    penicillin (Unknown)    Intolerances        Review of Systems:    General: no fevers or chills    ENT:  No dysphagia reported   CV:  No chest pain  Resp:  No dyspnea, cough, or wheezing  GI: denies nausea or vomiting, no abdominal pain        Vital Signs Last 24 Hrs  T(C): 37.6 (21 Oct 2019 08:59), Max: 37.6 (21 Oct 2019 08:59)  T(F): 99.6 (21 Oct 2019 08:59), Max: 99.6 (21 Oct 2019 08:59)  HR: 100 (21 Oct 2019 08:59) (100 - 108)  BP: 150/80 (21 Oct 2019 08:59) (145/75 - 164/77)  BP(mean): --  RR: 18 (21 Oct 2019 08:59) (18 - 18)  SpO2: 95% (21 Oct 2019 08:59) (95% - 97%)    PHYSICAL EXAM:    Constitutional: NAD, non toxic   Respiratory: anterior chest wall clear to auscultation   Cardiovascular: S1 and S2, RRR  Gastrointestinal: soft mild distended girth   Extremities: No peripheral edema, or cyanosis  Neurological: awake  and responsive   Skin: No visible  rashes      LABS:                        8.6    15.84 )-----------( 467      ( 21 Oct 2019 09:13 )             27.2     10-21  Hemoglobin: 8.6 g/dL <L> [11.5 - 15.5] (10-21 @ 09:13)  Hemoglobin: 9.3 g/dL <L> [11.5 - 15.5] (10-20 @ 08:37)  Hemoglobin: 9.6 g/dL <L> [11.5 - 15.5] (10-19 @ 10:33)            133<L>  |  95<L>  |  20  ----------------------------<  162<H>  3.9   |  28  |  0.45<L>    Ca    9.6      21 Oct 2019 07:41          LIVER FUNCTIONS - ( 18 Oct 2019 10:07 )  Alb: 1.9 g/dL / Pro: 5.4 g/dL / ALK PHOS: x     / ALT: x     / AST: x     / GGT: x             RADIOLOGY & ADDITIONAL TESTS:

## 2019-10-21 NOTE — PROGRESS NOTE ADULT - ATTENDING COMMENTS
11. Hypokalemia follow  12.  Leukocytosis trending up again pt afebrile with no localizing signs of infection  13. Right middle lobe mucoid impaction no treatment needed at this time  14.Urinary retention imaging of spine nl try dc catheter and follow PVR  15. HTN follow BP contin current meds    Gamal Martinez MD, FAAP  office [phone]  cell [phone] 11. Hypokalemia follow  12.  Leukocytosis trending up again pt afebrile with Still some mild ongoing abdominal discomfort and distention will repeat CT of abdomen with contrast this time  13. Right middle lobe mucoid impaction no treatment needed at this time  14.Urinary retention imaging of spine nl try dc catheter and follow PVR  15. HTN follow BP contin current meds    Gamal Martinez MD, FAAP  office [phone]  cell [phone]

## 2019-10-21 NOTE — PROGRESS NOTE ADULT - ATTENDING COMMENTS
ABdomen mildly distended, nontender, soft. Per son had bm yesterday.     plan; continue management as above             oob to chair.

## 2019-10-21 NOTE — PROGRESS NOTE ADULT - ASSESSMENT
89 F w/ hx of DM2, Breast cancer, gastric ulcer, afib not on AC due to falls risk, HTN, HLD p/w worsening weakness and anemia. Pt was recently admitted for GIB roughly 3 weeks ago and upon endoscopy was found to have gastritis and duodenal ulcer.  Leukocytosis, no fever  UA was negative, then became positive; UCX contam--subspeciated with multiple type E coli, S CTX, cefazolin  BCX NGTD  Rising WBC during hospital stay--unclear significance  CT A/P negative, but evidence metastatic disease  Has PCN allergy, tolerating cefepime/ceftriaxone  Complete treatment for UTI  Overall, UTI, leukocytosis, weakness  - Ceftriaxone 1g q 24 through today then discontinue  - Trend WBC, monitor for further signs infection/sepsis  - Malignancy workup per primary team  - Signing off. Please call with further questions or change in status.    Denis Peng MD  Pager [phone]  After 5pm and on weekends call [phone]

## 2019-10-21 NOTE — PROGRESS NOTE ADULT - ASSESSMENT
89 yr old female with hx breast cancer, recent herpes zoster that she was taking NSAIDS for. Labs were noted for anemia, she  had recent endoscopy 9/18/19 for anemia noted for hiatal hernia, gastritis with superficial ulcerations and erosions in the stomach and duodenum. She was found to be H. pylori +, no treatment per family request. There has been no report of active GI blood loss has improved overall  She continues on antibiotics     A CT dated 10/14/19  to further evaluate abdominal distention did not report any bowel distention or ascites, but it did reported lytic osseous lesions suspicious for metastatic disease. Patient had bone scan that reported metastatic disease to the mid and lower thoracic spine,  left lower  rib / pelvis and prox right femur . Family to determine goals of care        Recommendations:    Continue PPI BID   Encourage po diet as tolerated   Monitor labs and trends as ordered   Duration of antibiotics deferred to primary team   Monitor bowel movements on antibiotics and current bowel regimen    Defer bone scan findings to primary team for discussion with patient and family to establish goals of care        SNOW RangelBC  Coverage for [facility]  [phone]  Call [phone] after hours

## 2019-10-22 LAB
ANION GAP SERPL CALC-SCNC: 9 MMOL/L — SIGNIFICANT CHANGE UP (ref 5–17)
BASOPHILS # BLD AUTO: 0.05 K/UL — SIGNIFICANT CHANGE UP (ref 0–0.2)
BASOPHILS NFR BLD AUTO: 0.3 % — SIGNIFICANT CHANGE UP (ref 0–2)
BUN SERPL-MCNC: 16 MG/DL — SIGNIFICANT CHANGE UP (ref 7–23)
CALCIUM SERPL-MCNC: 9.3 MG/DL — SIGNIFICANT CHANGE UP (ref 8.4–10.5)
CHLORIDE SERPL-SCNC: 93 MMOL/L — LOW (ref 96–108)
CO2 SERPL-SCNC: 31 MMOL/L — SIGNIFICANT CHANGE UP (ref 22–31)
CREAT SERPL-MCNC: 0.46 MG/DL — LOW (ref 0.5–1.3)
EOSINOPHIL # BLD AUTO: 0.13 K/UL — SIGNIFICANT CHANGE UP (ref 0–0.5)
EOSINOPHIL NFR BLD AUTO: 0.8 % — SIGNIFICANT CHANGE UP (ref 0–6)
GLUCOSE BLDC GLUCOMTR-MCNC: 163 MG/DL — HIGH (ref 70–99)
GLUCOSE BLDC GLUCOMTR-MCNC: 235 MG/DL — HIGH (ref 70–99)
GLUCOSE SERPL-MCNC: 170 MG/DL — HIGH (ref 70–99)
HCT VFR BLD CALC: 26.6 % — LOW (ref 34.5–45)
HGB BLD-MCNC: 8.4 G/DL — LOW (ref 11.5–15.5)
IMM GRANULOCYTES NFR BLD AUTO: 1 % — SIGNIFICANT CHANGE UP (ref 0–1.5)
LYMPHOCYTES # BLD AUTO: 0.96 K/UL — LOW (ref 1–3.3)
LYMPHOCYTES # BLD AUTO: 6.2 % — LOW (ref 13–44)
MCHC RBC-ENTMCNC: 27.6 PG — SIGNIFICANT CHANGE UP (ref 27–34)
MCHC RBC-ENTMCNC: 31.6 GM/DL — LOW (ref 32–36)
MCV RBC AUTO: 87.5 FL — SIGNIFICANT CHANGE UP (ref 80–100)
MONOCYTES # BLD AUTO: 1.34 K/UL — HIGH (ref 0–0.9)
MONOCYTES NFR BLD AUTO: 8.7 % — SIGNIFICANT CHANGE UP (ref 2–14)
NEUTROPHILS # BLD AUTO: 12.77 K/UL — HIGH (ref 1.8–7.4)
NEUTROPHILS NFR BLD AUTO: 83 % — HIGH (ref 43–77)
PLATELET # BLD AUTO: 503 K/UL — HIGH (ref 150–400)
POTASSIUM SERPL-MCNC: 4.2 MMOL/L — SIGNIFICANT CHANGE UP (ref 3.5–5.3)
POTASSIUM SERPL-SCNC: 4.2 MMOL/L — SIGNIFICANT CHANGE UP (ref 3.5–5.3)
RBC # BLD: 3.04 M/UL — LOW (ref 3.8–5.2)
RBC # FLD: 14.7 % — HIGH (ref 10.3–14.5)
SODIUM SERPL-SCNC: 133 MMOL/L — LOW (ref 135–145)
WBC # BLD: 15.41 K/UL — HIGH (ref 3.8–10.5)
WBC # FLD AUTO: 15.41 K/UL — HIGH (ref 3.8–10.5)

## 2019-10-22 PROCEDURE — 99232 SBSQ HOSP IP/OBS MODERATE 35: CPT

## 2019-10-22 PROCEDURE — 74177 CT ABD & PELVIS W/CONTRAST: CPT | Mod: 26

## 2019-10-22 RX ADMIN — SENNA PLUS 2 TABLET(S): 8.6 TABLET ORAL at 20:51

## 2019-10-22 RX ADMIN — PANTOPRAZOLE SODIUM 40 MILLIGRAM(S): 20 TABLET, DELAYED RELEASE ORAL at 20:51

## 2019-10-22 RX ADMIN — SIMETHICONE 80 MILLIGRAM(S): 80 TABLET, CHEWABLE ORAL at 23:07

## 2019-10-22 RX ADMIN — POLYETHYLENE GLYCOL 3350 17 GRAM(S): 17 POWDER, FOR SOLUTION ORAL at 06:20

## 2019-10-22 RX ADMIN — Medication 25 MILLIGRAM(S): at 06:19

## 2019-10-22 RX ADMIN — Medication 1 MILLIGRAM(S): at 13:14

## 2019-10-22 RX ADMIN — SENNA PLUS 2 TABLET(S): 8.6 TABLET ORAL at 06:19

## 2019-10-22 RX ADMIN — AMLODIPINE BESYLATE 5 MILLIGRAM(S): 2.5 TABLET ORAL at 06:19

## 2019-10-22 RX ADMIN — NORTRIPTYLINE HYDROCHLORIDE 25 MILLIGRAM(S): 10 CAPSULE ORAL at 23:07

## 2019-10-22 RX ADMIN — SODIUM CHLORIDE 75 MILLILITER(S): 9 INJECTION INTRAMUSCULAR; INTRAVENOUS; SUBCUTANEOUS at 13:15

## 2019-10-22 RX ADMIN — PANTOPRAZOLE SODIUM 40 MILLIGRAM(S): 20 TABLET, DELAYED RELEASE ORAL at 06:19

## 2019-10-22 RX ADMIN — Medication 100 MILLIGRAM(S): at 13:14

## 2019-10-22 RX ADMIN — CHLORHEXIDINE GLUCONATE 1 APPLICATION(S): 213 SOLUTION TOPICAL at 13:16

## 2019-10-22 RX ADMIN — Medication 20 MILLIGRAM(S): at 06:19

## 2019-10-22 NOTE — CONSULT NOTE ADULT - SUBJECTIVE AND OBJECTIVE BOX
HPI:  89F w/ hx of DM2, Breast cancer, gastric ulcer, afib not on AC due to falls risk, HTN, HLD p/w worsening weakness and anemia. Pt was recently admitted for GIB roughly 3 weeks ago and upon endoscopy was found to have gastritis and duodenal ulcer. This was in setting of increased NSAID use to control pain from shingles exacerbation. Pt received transfusions and was discharged to follow up with GI regarding biopsy results. Son and pt informed of H. pyolri in biopsy. After lengthy family discussion, decision was made not to treat. Son noticed his mother getting weaker over past 1-2 weeks. Asked PMD to perform repeat blood test. Pt was found to have hgb of 7 and sent to ER for further evaluation. Pt denies any chest pain, SOB, palpitations. Endorses black mushy stool after starting iron in the hospital. Endorses waking up at night with sweats for many days. Denies any additional NSAID use.    In ER: Given acetaminophen (12 Oct 2019 02:58)      Imaging:  MR T/L: abnormal signal in T8/9 involving body and posterior elements.  Exam:   sleeping, arousable to voice, FC, LOVELACE 5/5      --Anticoagulation:    =====================  PAST MEDICAL HISTORY   Unsteady gait  Osteopenia  Injury of head, subsequent encounter  Fall, subsequent encounter  Malignant neoplasm of left breast in female, estrogen receptor positive, unspecified site of breast  Polymyalgia rheumatica  CAD (coronary artery disease)  Bleeding Duodenal Ulcer  Age-Related Hearing Loss  Hypercholesteremia  HTN (Hypertension)  Atrial Fibrillation    PAST SURGICAL HISTORY   Closed fracture of patella  S/P colonoscopy  S/P breast biopsy, left  Atherosclerotic Coronary Vascular Disease  Stented Coronary Artery    penicillin (Unknown)      MEDICATIONS:  Antibiotics:    Neuro:  acetaminophen    Suspension .. 650 milliGRAM(s) Oral every 4 hours PRN  nortriptyline 25 milliGRAM(s) Oral daily  ondansetron Injectable 4 milliGRAM(s) IV Push every 6 hours PRN    Other:  amLODIPine   Tablet 5 milliGRAM(s) Oral daily  docusate sodium 100 milliGRAM(s) Oral daily  folic acid 1 milliGRAM(s) Oral daily  furosemide    Tablet 20 milliGRAM(s) Oral daily  metoprolol succinate ER 25 milliGRAM(s) Oral daily  pantoprazole    Tablet 40 milliGRAM(s) Oral two times a day  polyethylene glycol 3350 17 Gram(s) Oral two times a day  senna 2 Tablet(s) Oral two times a day  simethicone drops 80 milliGRAM(s) Oral two times a day PRN  sodium chloride 0.9%. 1000 milliLiter(s) IV Continuous <Continuous>      SOCIAL HISTORY:   Occupation:   Marital Status:     FAMILY HISTORY:  FH: breast cancer  No pertinent family history in first degree relatives      ROS: Negative except per HPI    LABS:                          8.4    15.41 )-----------( 503      ( 22 Oct 2019 11:02 )             26.6     10-22    133<L>  |  93<L>  |  16  ----------------------------<  170<H>  4.2   |  31  |  0.46<L>    Ca    9.3      22 Oct 2019 09:45

## 2019-10-22 NOTE — PROGRESS NOTE ADULT - ASSESSMENT
89 yr old female with hx breast cancer, recent herpes zoster that she was taking NSAIDS for. Labs were noted for anemia, she had recent endoscopy 9/18/19 for anemia noted for hiatal hernia, gastritis with superficial ulcerations and erosions in the stomach and duodenum. She was found to be H. pylori +, no treatment per family request. Hgb and Hct essentially stable and there has been no report of active GI blood loss . She is on PPI   A CT dated 10/14/19  to further evaluate abdominal distention (unchanged) did not report any bowel distention or ascites, but it did reported lytic osseous lesions suspicious for metastatic disease.  Patient had bone scan that reported metastatic disease to the mid and lower thoracic spine, left lower  rib / pelvis and prox right femur .Heme-Onc following  Family to determine goals of care        Recommendations:    Continue PPI BID   Encourage po diet as tolerated   Monitor labs and trends as ordered   Defer bone scan findings to Heme-Onc / primary team for discussion with patient and family to establish goals of care        SNOW RangelBC  Coverage for [facility]  [phone]    Call [phone] after hours 89 yr old female with hx breast cancer, recent herpes zoster that she was taking NSAIDS for. Labs were noted for anemia, she had recent endoscopy 9/18/19 for anemia noted for hiatal hernia, gastritis with superficial ulcerations and erosions in the stomach and duodenum. She was found to be H. pylori +, no treatment per family request. Hgb and Hct essentially stable and there has been no report of active GI blood loss . She is on PPI   A CT dated 10/14/19  to further evaluate abdominal distention (unchanged) did not report any bowel distention or ascites, but it did reported lytic osseous lesions suspicious for metastatic disease.  Patient had bone scan that reported metastatic disease to the mid and lower thoracic spine, left lower  rib / pelvis and prox right femur .Heme-Onc following  Family to determine goals of care        Recommendations:    Continue PPI BID   Encourage po diet as tolerated   Monitor labs and trends as ordered   Defer bone scan findings to Heme-Onc / primary team for discussion with patient and family to establish goals of care        SNOW RangelBC  Coverage for [facility]  [phone]     Call [phone] after hours

## 2019-10-22 NOTE — PROGRESS NOTE ADULT - SUBJECTIVE AND OBJECTIVE BOX
Subjective: Still some mild intermittent confusion complains of some abdominal discomfort on occasion still some mild distention has been getting up out of bed into the chair somewhat no fever    Exam:  Gen: NAD  Neck: no JVD  Chest: normal shape and expansion  Heart: RRR with 2/6 systolic murmur  Lungs: CTAB  Abdomen: Soft, Minimally distended and soft with mild diffuse abdominal tenderness no rebound tenderness, no guarding, no rigidity, no HSM, normoactive BS x 4  Ext: no CCE  Skin: Healing lesions on left torso from shingles        Vital Signs Last 24 Hrs  T(C): 37.2 (21 Oct 2019 23:58), Max: 37.7 (21 Oct 2019 16:55)  T(F): 99 (21 Oct 2019 23:58), Max: 99.8 (21 Oct 2019 16:55)  HR: 105 (22 Oct 2019 06:14) (100 - 109)  BP: 152/82 (22 Oct 2019 06:14) (134/68 - 152/82)  BP(mean): --  RR: 18 (21 Oct 2019 23:58) (18 - 18)  SpO2: 97% (21 Oct 2019 23:58) (95% - 97%)    Daily     Daily     I&O's Detail    21 Oct 2019 07:01  -  22 Oct 2019 07:00  --------------------------------------------------------  IN:    Oral Fluid: 120 mL    sodium chloride 0.9%.: 900 mL  Total IN: 1020 mL    OUT:    Indwelling Catheter - Urethral: 1350 mL  Total OUT: 1350 mL    Total NET: -330 mL          Daily     CAPILLARY BLOOD GLUCOSE      POCT Blood Glucose.: 192 mg/dL (21 Oct 2019 13:11)      MEDICATIONS  (STANDING):  amLODIPine   Tablet 5 milliGRAM(s) Oral daily  chlorhexidine 2% Cloths 1 Application(s) Topical daily  docusate sodium 100 milliGRAM(s) Oral daily  folic acid 1 milliGRAM(s) Oral daily  furosemide    Tablet 20 milliGRAM(s) Oral daily  metoprolol succinate ER 25 milliGRAM(s) Oral daily  nortriptyline 25 milliGRAM(s) Oral daily  pantoprazole    Tablet 40 milliGRAM(s) Oral two times a day  polyethylene glycol 3350 17 Gram(s) Oral two times a day  senna 2 Tablet(s) Oral two times a day  sodium chloride 0.9%. 1000 milliLiter(s) (75 mL/Hr) IV Continuous <Continuous>    MEDICATIONS  (PRN):  acetaminophen    Suspension .. 650 milliGRAM(s) Oral every 4 hours PRN Temp greater or equal to 38C (100.4F), Moderate Pain (4 - 6)  ondansetron Injectable 4 milliGRAM(s) IV Push every 6 hours PRN Nausea and/or Vomiting  simethicone drops 80 milliGRAM(s) Oral two times a day PRN Gas      Labs:                          8.6    15.84 )-----------( 467      ( 21 Oct 2019 09:13 )             27.2       10-21    133<L>  |  95<L>  |  20  ----------------------------<  162<H>  3.9   |  28  |  0.45<L>    Ca    9.6      21 Oct 2019 07:41              [id number] @ 08:52  .Blood    No growth to date.  n/a  n/a  n/a  n/a  n/a    5FC: n/a  AMK: n/a  AMB:  n/a  AMP: n/a  A/S: n/a  AND: n/a  AZM: n/a  AZT: n/a  GORDY: n/a  CFZ: n/a  CFP: n/a  : n/a  CFX: n/a  CTZ: n/a  C/A: n/a  CTX: n/a  CFU: n/a  CHL: n/a  CIP: n/a  CLI: n/a  DAP: n/a  ERT: n/a  LUCIA: n/a  FLU: n/a  GEN: n/a  IMP: n/a  ITR: n/a  LEV: n/a  BRYANT: n/a  DAV: n/a  DONNA: n/a  MOX: n/a  OXA: n/a  PCN: n/a  P/T: n/a  POS: n/a  RIF: n/a  TCN: n/a  TGC: n/a  TOB: n/a  T/S: n/a  VAN: n/a  VORI: n/a

## 2019-10-22 NOTE — PROGRESS NOTE ADULT - SUBJECTIVE AND OBJECTIVE BOX
Chief Complaint: fu    History of Present Illness:  pt more awake earlier per RN and ate some breakfast; currently sleeping- arousable to voice but not currently answering questions; no f/c, with shoulder pain earlier, +BM- dark brown per RN, no other bleeding, ROS limited      MEDICATIONS  (STANDING):  amLODIPine   Tablet 5 milliGRAM(s) Oral daily  chlorhexidine 2% Cloths 1 Application(s) Topical daily  docusate sodium 100 milliGRAM(s) Oral daily  folic acid 1 milliGRAM(s) Oral daily  furosemide    Tablet 20 milliGRAM(s) Oral daily  metoprolol succinate ER 25 milliGRAM(s) Oral daily  nortriptyline 25 milliGRAM(s) Oral daily  pantoprazole    Tablet 40 milliGRAM(s) Oral two times a day  polyethylene glycol 3350 17 Gram(s) Oral two times a day  senna 2 Tablet(s) Oral two times a day  sodium chloride 0.9%. 1000 milliLiter(s) (75 mL/Hr) IV Continuous <Continuous>    MEDICATIONS  (PRN):  acetaminophen    Suspension .. 650 milliGRAM(s) Oral every 4 hours PRN Temp greater or equal to 38C (100.4F), Moderate Pain (4 - 6)  ondansetron Injectable 4 milliGRAM(s) IV Push every 6 hours PRN Nausea and/or Vomiting  simethicone drops 80 milliGRAM(s) Oral two times a day PRN Gas      Allergies    penicillin (Unknown)    Intolerances        Vital Signs Last 24 Hrs  T(C): 37.1 (22 Oct 2019 09:24), Max: 37.7 (21 Oct 2019 16:55)  T(F): 98.8 (22 Oct 2019 09:24), Max: 99.8 (21 Oct 2019 16:55)  HR: 100 (22 Oct 2019 09:24) (100 - 109)  BP: 148/80 (22 Oct 2019 09:24) (134/68 - 152/82)  BP(mean): --  RR: 18 (22 Oct 2019 09:24) (18 - 18)  SpO2: 96% (22 Oct 2019 09:24) (96% - 97%)    PHYSICAL EXAM  General: adult in NAD  HEENT: clear oropharynx, anicteric sclera, pink conjunctiva  Neck: supple  CV: normal S1/S2  Lungs: decreased BS, poor effort  Abdomen: soft, sl diffuse tenderness, positive bowel sounds  Ext: no clubbing cyanosis or edema  Skin: no rashes and no petechiae      LABS:                          8.4    15.41 )-----------( 503      ( 22 Oct 2019 11:02 )             26.6         Mean Cell Volume : 87.5 fl  Mean Cell Hemoglobin : 27.6 pg  Mean Cell Hemoglobin Concentration : 31.6 gm/dL  Auto Neutrophil # : 12.77 K/uL  Auto Lymphocyte # : 0.96 K/uL  Auto Monocyte # : 1.34 K/uL  Auto Eosinophil # : 0.13 K/uL  Auto Basophil # : 0.05 K/uL  Auto Neutrophil % : 83.0 %  Auto Lymphocyte % : 6.2 %  Auto Monocyte % : 8.7 %  Auto Eosinophil % : 0.8 %  Auto Basophil % : 0.3 %      Serial CBC's  10-22 @ 11:02  Hct-26.6 / Hgb-8.4 / Plat-503 / RBC-3.04 / WBC-15.41  Serial CBC's  10-21 @ 09:13  Hct-27.2 / Hgb-8.6 / Plat-467 / RBC-3.13 / WBC-15.84  Serial CBC's  10-20 @ 08:37  Hct-29.3 / Hgb-9.3 / Plat-475 / RBC-3.33 / WBC-18.39  Serial CBC's  10-19 @ 10:33  Hct-30.2 / Hgb-9.6 / Plat-445 / RBC-3.44 / WBC-19.06  Serial CBC's  10-18 @ 13:25  Hct-29.8 / Hgb-9.9 / Plat-438 / RBC-3.54 / WBC-18.13      10-22    133<L>  |  93<L>  |  16  ----------------------------<  170<H>  4.2   |  31  |  0.46<L>    Ca    9.3      22 Oct 2019 09:45            Ferritin, Serum: 1918 ng/mL (10-16 @ 10:04)  Vitamin B12, Serum: 518 pg/mL (10-16 @ 10:04)  Folate, Serum: 4.5 ng/mL (10-16 @ 10:04)  Iron - Total Binding Capacity.: 120 ug/dL (10-16 @ 10:04)      Serum Protein Electrophoresis Interp: Gamma-Migrating Paraprotein Identified (10-18 @ 10:07)  Immunofixation, Serum:   IgG Lambda Band Identified    Reference Range: None Detected (10-18 @ 10:07)  GUILLERMINA Kappa: 2.83 mg/dL (10-18 @ 10:07)  GUILLERMINA Lambda: 2.36 mg/dL (10-18 @ 10:07)          Radiology:

## 2019-10-22 NOTE — PROGRESS NOTE ADULT - ASSESSMENT
89F w/ hx of DM2, Breast cancer, gastric ulcer, afib not on AC due to falls risk, HTN, HLD p/w worsening weakness and anemia, treated for UTI, found to have lytic lesions on CT abdomen    #lytic lesions on CT- with history of Breast cancer Stage 1c mucinous, ER+/MI+/Znv2lhl - s/p lumpectomy- patient had refused any adjuvant hormonal treatment; recently CA 27-29 with slight trend up though still normal; mammogram/US 10/30/18- negative  - likely metastatic breast cancer  - CT chest without lung lesions; MRI brain no mets; bone scan with multiple mets- mid/lower thoracic spine, left lower rib, pelvis, proximal right femur without cortical disruption  - MRI spine no cord compression  - SPEP/IF - IF IgG lambda band; K/L ratio is normal, M spike with 0.2 g/dl gamma migrating paraprotein- VERY small M spike, likely incidental finding  - ideally obtain biopsy for further eval/diagnosis but consider empiric rx with aromatase inhibitor in this lady with advanced age and now decreasing status-- d/w Dr. Martinez- HCP will likely opt for no biopsy    #Anemia, normocytic- likely multifactorial- AOCD + GI loss with guaiac positive stool  - recent EGD with gastritis/duodenal ulcer, guaiac + this admission- on PPI  - s/p 1 unit PRBC 10/11  - iron studies AOCD; B12 level normal; Folate slightly low- on replacement  - Hg trending down- monitor    #thrombocytosis- likely reactive in setting of acute illness/ UTI/ recent GIB  - platelet count had been normal 2018, monitor    #hypercalcemia- likely secondary to malignancy +/- volume depletion  - now off HCTZ  - on hydration  - PTH low, PTHrP pending  - Ca improved    #ID- s/p ceftriaxone; CT abdomen ordered    d/w Dr. Martinez today on phone

## 2019-10-22 NOTE — PROGRESS NOTE ADULT - SUBJECTIVE AND OBJECTIVE BOX
INTERVAL HPI/OVERNIGHT EVENTS:  Patient was sitting up earlier having some breakfast , with no GI complaints     MEDICATIONS  (STANDING):  amLODIPine   Tablet 5 milliGRAM(s) Oral daily  chlorhexidine 2% Cloths 1 Application(s) Topical daily  docusate sodium 100 milliGRAM(s) Oral daily  folic acid 1 milliGRAM(s) Oral daily  furosemide    Tablet 20 milliGRAM(s) Oral daily  metoprolol succinate ER 25 milliGRAM(s) Oral daily  nortriptyline 25 milliGRAM(s) Oral daily  pantoprazole    Tablet 40 milliGRAM(s) Oral two times a day  polyethylene glycol 3350 17 Gram(s) Oral two times a day  senna 2 Tablet(s) Oral two times a day  sodium chloride 0.9%. 1000 milliLiter(s) (75 mL/Hr) IV Continuous <Continuous>    MEDICATIONS  (PRN):  acetaminophen    Suspension .. 650 milliGRAM(s) Oral every 4 hours PRN Temp greater or equal to 38C (100.4F), Moderate Pain (4 - 6)  ondansetron Injectable 4 milliGRAM(s) IV Push every 6 hours PRN Nausea and/or Vomiting  simethicone drops 80 milliGRAM(s) Oral two times a day PRN Gas      Allergies    penicillin (Unknown)    Intolerances        Review of Systems:    General:  No  fevers or chills   ENT:  No dysphagia, sub optimal po intake reported   CV:  No chest pain  Resp:  No dyspnea, cough, tachypnea, wheezing  GI: denies nausea or vomiting , BM yesterday per documentation       Vital Signs Last 24 Hrs  T(C): 37.1 (22 Oct 2019 09:24), Max: 37.7 (21 Oct 2019 16:55)  T(F): 98.8 (22 Oct 2019 09:24), Max: 99.8 (21 Oct 2019 16:55)  HR: 100 (22 Oct 2019 09:24) (100 - 109)  BP: 148/80 (22 Oct 2019 09:24) (134/68 - 152/82)  BP(mean): --  RR: 18 (22 Oct 2019 09:24) (18 - 18)  SpO2: 96% (22 Oct 2019 09:24) (96% - 97%)    PHYSICAL EXAM:    Constitutional: awake, responsive, [place] , non toxic , in NAD  Neck:  supple  Respiratory: anterior chest wall clear to auscultation with no  wheezing  Cardiovascular: S1 and S2, RRR  Gastrointestinal: mild distended girth, + bowel sounds   Extremities: No peripheral edema, or cyanosis  Psychiatric: awake , responsive   Skin: No visible rashes      LABS:                        8.4    15.41 )-----------( 503      ( 22 Oct 2019 11:02 )             26.6     10-22    133<L>  |  93<L>  |  16  ----------------------------<  170<H>  4.2   |  31  |  0.46<L>    Ca    9.3      22 Oct 2019 09:45          LIVER FUNCTIONS - ( 18 Oct 2019 10:07 )  Alb: 1.9 g/dL / Pro: 5.4 g/dL / ALK PHOS: x     / ALT: x     / AST: x     / GGT: x             RADIOLOGY & ADDITIONAL TESTS:

## 2019-10-22 NOTE — PROGRESS NOTE ADULT - ATTENDING COMMENTS
11. Hypokalemia follow supplement as needed  12.  Leukocytosis ct abd ordered but not performed due to pts sons interference wbc down sl yesterday pending today if starts to rise repeat ct abd  13. Right middle lobe mucoid impaction no treatment needed at this time  14.Urinary retention imaging of spine nl try dc catheter and follow PVR  15. HTN follow BP contin current meds    Gamal Martinez MD, FAAP  office [phone]  cell [phone]

## 2019-10-22 NOTE — CHART NOTE - NSCHARTNOTEFT_GEN_A_CORE
Patient seen for evaluation for TLSO. There is some question regarding her ability to tolerate an orthosis. Will coordinate with physical therapy for trial to evaluate the benefits of wearing such a device. Contact information given. To notify office with any issues questions or concerns.  Héctor HERNANDEZ  [facility]  [phone]

## 2019-10-22 NOTE — CONSULT NOTE ADULT - ASSESSMENT
Jesenia Gerard  89F w/ hx of DM2, Breast cancer, gastric ulcer, afib not on AC due to falls risk, HTN, HLD p/w worsening weakness and anemia, treated for UTI, incidentally found to have lytic lesions on CT abdomen. Patient  is not complaining of any back pain/ radiculopathy, numbness or tinging. MR T/L: abnormal signal in T8/9 involving body and posterior elements. Exam: sleeping, arousable to voice, FC, LOVELACE 5/5  -Consulted for physical activity recommendations after MRI was done showing likely metastasis to spine.   -TLSO brace for comfort  -Please contact [facility] you for a fitted brace: [phone]  -Cleared to work with PT

## 2019-10-23 LAB
ANION GAP SERPL CALC-SCNC: 13 MMOL/L — SIGNIFICANT CHANGE UP (ref 5–17)
BUN SERPL-MCNC: 13 MG/DL — SIGNIFICANT CHANGE UP (ref 7–23)
CALCIUM SERPL-MCNC: 9.4 MG/DL — SIGNIFICANT CHANGE UP (ref 8.4–10.5)
CHLORIDE SERPL-SCNC: 91 MMOL/L — LOW (ref 96–108)
CO2 SERPL-SCNC: 29 MMOL/L — SIGNIFICANT CHANGE UP (ref 22–31)
CREAT SERPL-MCNC: 0.42 MG/DL — LOW (ref 0.5–1.3)
GLUCOSE BLDC GLUCOMTR-MCNC: 175 MG/DL — HIGH (ref 70–99)
GLUCOSE BLDC GLUCOMTR-MCNC: 186 MG/DL — HIGH (ref 70–99)
GLUCOSE BLDC GLUCOMTR-MCNC: 225 MG/DL — HIGH (ref 70–99)
GLUCOSE SERPL-MCNC: 138 MG/DL — HIGH (ref 70–99)
HCT VFR BLD CALC: 25.6 % — LOW (ref 34.5–45)
HGB BLD-MCNC: 8.1 G/DL — LOW (ref 11.5–15.5)
MCHC RBC-ENTMCNC: 27.7 PG — SIGNIFICANT CHANGE UP (ref 27–34)
MCHC RBC-ENTMCNC: 31.6 GM/DL — LOW (ref 32–36)
MCV RBC AUTO: 87.7 FL — SIGNIFICANT CHANGE UP (ref 80–100)
PLATELET # BLD AUTO: 484 K/UL — HIGH (ref 150–400)
POTASSIUM SERPL-MCNC: 3.7 MMOL/L — SIGNIFICANT CHANGE UP (ref 3.5–5.3)
POTASSIUM SERPL-SCNC: 3.7 MMOL/L — SIGNIFICANT CHANGE UP (ref 3.5–5.3)
PTH RELATED PROT SERPL-MCNC: <2 PMOL/L — SIGNIFICANT CHANGE UP
RBC # BLD: 2.92 M/UL — LOW (ref 3.8–5.2)
RBC # FLD: 14.6 % — HIGH (ref 10.3–14.5)
SODIUM SERPL-SCNC: 133 MMOL/L — LOW (ref 135–145)
WBC # BLD: 14.74 K/UL — HIGH (ref 3.8–10.5)
WBC # FLD AUTO: 14.74 K/UL — HIGH (ref 3.8–10.5)

## 2019-10-23 PROCEDURE — 99233 SBSQ HOSP IP/OBS HIGH 50: CPT

## 2019-10-23 RX ORDER — LACTULOSE 10 G/15ML
15 SOLUTION ORAL ONCE
Refills: 0 | Status: COMPLETED | OUTPATIENT
Start: 2019-10-23 | End: 2019-10-23

## 2019-10-23 RX ADMIN — SENNA PLUS 2 TABLET(S): 8.6 TABLET ORAL at 06:17

## 2019-10-23 RX ADMIN — CHLORHEXIDINE GLUCONATE 1 APPLICATION(S): 213 SOLUTION TOPICAL at 14:00

## 2019-10-23 RX ADMIN — SODIUM CHLORIDE 75 MILLILITER(S): 9 INJECTION INTRAMUSCULAR; INTRAVENOUS; SUBCUTANEOUS at 18:54

## 2019-10-23 RX ADMIN — POLYETHYLENE GLYCOL 3350 17 GRAM(S): 17 POWDER, FOR SOLUTION ORAL at 06:18

## 2019-10-23 RX ADMIN — SENNA PLUS 2 TABLET(S): 8.6 TABLET ORAL at 18:46

## 2019-10-23 RX ADMIN — LACTULOSE 15 GRAM(S): 10 SOLUTION ORAL at 14:00

## 2019-10-23 RX ADMIN — Medication 20 MILLIGRAM(S): at 06:18

## 2019-10-23 RX ADMIN — Medication 650 MILLIGRAM(S): at 19:01

## 2019-10-23 RX ADMIN — POLYETHYLENE GLYCOL 3350 17 GRAM(S): 17 POWDER, FOR SOLUTION ORAL at 18:46

## 2019-10-23 RX ADMIN — AMLODIPINE BESYLATE 5 MILLIGRAM(S): 2.5 TABLET ORAL at 06:18

## 2019-10-23 RX ADMIN — Medication 100 MILLIGRAM(S): at 13:59

## 2019-10-23 RX ADMIN — NORTRIPTYLINE HYDROCHLORIDE 25 MILLIGRAM(S): 10 CAPSULE ORAL at 21:53

## 2019-10-23 RX ADMIN — Medication 650 MILLIGRAM(S): at 19:31

## 2019-10-23 RX ADMIN — Medication 25 MILLIGRAM(S): at 06:18

## 2019-10-23 RX ADMIN — PANTOPRAZOLE SODIUM 40 MILLIGRAM(S): 20 TABLET, DELAYED RELEASE ORAL at 06:18

## 2019-10-23 RX ADMIN — Medication 10 MILLIGRAM(S): at 00:02

## 2019-10-23 RX ADMIN — PANTOPRAZOLE SODIUM 40 MILLIGRAM(S): 20 TABLET, DELAYED RELEASE ORAL at 18:46

## 2019-10-23 RX ADMIN — Medication 1 MILLIGRAM(S): at 13:59

## 2019-10-23 NOTE — PROGRESS NOTE ADULT - SUBJECTIVE AND OBJECTIVE BOX
Subjective: Still some mild intermittent confusion complains of some abdominal discomfort on occasion still some mild distention has been getting up out of bed into the chair somewhat no fever    Exam:  Gen: NAD  Neck: no JVD  Chest: normal shape and expansion  Heart: RRR with 2/6 systolic murmur  Lungs: CTAB  Abdomen: Soft, Minimally distended and soft with mild diffuse abdominal tenderness no rebound tenderness, no guarding, no rigidity, no HSM, normoactive BS x 4  Ext: no CCE  Skin: Healing lesions on left torso from shingles        Vital Signs Last 24 Hrs  T(C): 36.5 (23 Oct 2019 07:31), Max: 37.1 (23 Oct 2019 00:08)  T(F): 97.7 (23 Oct 2019 07:31), Max: 98.8 (23 Oct 2019 00:08)  HR: 99 (23 Oct 2019 07:31) (99 - 108)  BP: 130/85 (23 Oct 2019 07:31) (130/85 - 146/74)  BP(mean): --  RR: 18 (23 Oct 2019 07:31) (18 - 18)  SpO2: 96% (23 Oct 2019 07:31) (94% - 96%)    Daily     Daily Weight in k (23 Oct 2019 09:39)    I&O's Detail    22 Oct 2019 07:01  -  23 Oct 2019 07:00  --------------------------------------------------------  IN:    Oral Fluid: 500 mL    sodium chloride 0.9%.: 900 mL  Total IN: 1400 mL    OUT:    Indwelling Catheter - Urethral: 1650 mL  Total OUT: 1650 mL    Total NET: -250 mL      23 Oct 2019 07:  -  23 Oct 2019 15:43  --------------------------------------------------------  IN:    Oral Fluid: 240 mL  Total IN: 240 mL    OUT:    Indwelling Catheter - Urethral: 1600 mL  Total OUT: 1600 mL    Total NET: -1360 mL          Daily 38 (10-23 @ 09:39)    CAPILLARY BLOOD GLUCOSE      POCT Blood Glucose.: 175 mg/dL (23 Oct 2019 12:08)  POCT Blood Glucose.: 186 mg/dL (23 Oct 2019 09:15)  POCT Blood Glucose.: 163 mg/dL (22 Oct 2019 23:05)  POCT Blood Glucose.: 235 mg/dL (22 Oct 2019 17:59)      MEDICATIONS  (STANDING):  amLODIPine   Tablet 5 milliGRAM(s) Oral daily  chlorhexidine 2% Cloths 1 Application(s) Topical daily  docusate sodium 100 milliGRAM(s) Oral daily  folic acid 1 milliGRAM(s) Oral daily  furosemide    Tablet 20 milliGRAM(s) Oral daily  metoprolol succinate ER 25 milliGRAM(s) Oral daily  nortriptyline 25 milliGRAM(s) Oral daily  pantoprazole    Tablet 40 milliGRAM(s) Oral two times a day  polyethylene glycol 3350 17 Gram(s) Oral two times a day  senna 2 Tablet(s) Oral two times a day  sodium chloride 0.9%. 1000 milliLiter(s) (75 mL/Hr) IV Continuous <Continuous>    MEDICATIONS  (PRN):  acetaminophen    Suspension .. 650 milliGRAM(s) Oral every 4 hours PRN Temp greater or equal to 38C (100.4F), Moderate Pain (4 - 6)  bisacodyl Suppository 10 milliGRAM(s) Rectal daily PRN Constipation  ondansetron Injectable 4 milliGRAM(s) IV Push every 6 hours PRN Nausea and/or Vomiting  simethicone drops 80 milliGRAM(s) Oral two times a day PRN Gas      Labs:                             8.1    14.74 )-----------( 484      ( 23 Oct 2019 14:15 )             25.6   baso x      eos x      imm gran x      lymph x      mono x      poly x                            8.4    15.41 )-----------( 503      ( 22 Oct 2019 11:02 )             26.6   baso 0.3    eos 0.8    imm gran 1.0    lymph 6.2    mono 8.7    poly 83.0                         8.6    15.84 )-----------( 467      ( 21 Oct 2019 09:13 )             27.2   baso 0.0    eos 0.0    imm gran x      lymph 10.4   mono 5.2    poly 83.5     10-23 @ 10:30    133<L>  |  91<L>  |  13  ----------------------------<  138<H>  3.7   |  29  |  0.42<L>      10-22 @ 09:45    133<L>  |  93<L>  |  16  ----------------------------<  170<H>  4.2   |  31  |  0.46<L>    10-21 @ 07:41    133<L>  |  95<L>  |  20  ----------------------------<  162<H>  3.9   |  28  |  0.45<L>    10-20 @ 05:46    134<L>  |  97  |  19  ----------------------------<  129<H>  3.9   |  24  |  0.43<L>    10-19 @ 07:38    131<L>  |  95<L>  |  21  ----------------------------<  149<H>  3.9   |  27  |  0.44<L>      Ca    9.4      10-23 @ 10:30  Ca    9.3      10-22 @ 09:45  Ca    9.6      10-21 @ 07:41  Ca    8.9      10-20 @ 05:46  Ca    9.4      10-19 @ 07:38      < from: CT Abdomen and Pelvis w/ Oral Cont and w/ IV Cont (10.22.19 @ 22:45) >  INTERPRETATION:  CLINICAL INFORMATION: Patient with breast cancer with   metastatic osseous disease complaining of abdominal pain and distention..    COMPARISON: CT abdomen and pelvis 10/14/2019    PROCEDURE:   CT of the Abdomen and Pelvis was performed with intravenous contrast.   Intravenous contrast: 90 ml Omnipaque 350. 10 ml discarded.  Oral contrast: positive contrast was administered.  Sagittal and coronal reformats were performed.    FINDINGS:    LOWER CHEST: Small left pleural effusion and trace right pleural   effusion. Bibasilar subsegmental atelectasis. Atherosclerotic   calcifications of coronary arteries.    LIVER: Indeterminate right hepatic lobe hypodense lesion measuring 2.0   cm, unchanged from prior study. An additional subcapsular lesion in   segment 3 measuring 1.1 cm (3, 38) is also indeterminate. This lesions   can be further evaluated with liver protocol MRI.  BILE DUCTS: Normal caliber.  GALLBLADDER: Within normal limits.  SPLEEN: Within normal limits.  PANCREAS: Within normal limits.  ADRENALS: Within normal limits.  KIDNEYS/URETERS: Bilateral renal cysts. Mild right hydronephrosis.   BLADDER: [place] catheterin place.  REPRODUCTIVE ORGANS: Uterus and adnexa within normal limits.    BOWEL: No bowel obstruction. Stool is seen throughout the colon. Appendix   is normal.  PERITONEUM: No ascites.  VESSELS: Atherosclerotic changes.  RETROPERITONEUM/LYMPH NODES: No lymphadenopathy.    ABDOMINAL WALL: Within normal limits.  BONES: Extensive osseous lytic lesions in the bony pelvis, vertebral   bodies and metastatic nodules involving the right and bilateral rib and   LEFT lateral 11th rib.    IMPRESSION:     No bowel obstruction.    Mild right hydronephrosis.    Multiple lytic osseous lesions consistent with metastatic disease.    Indeterminate liver lesions.                    CAMI CASTILLO MD,RADIOLOGY FELLOW  This document has been electronically signed.  JENNIFER STOCKTON M.D., ATTENDING RADIOLOGIST  This document has been electronically signed. Oct 23 2019 11:05AM    < end of copied text >

## 2019-10-23 NOTE — PROGRESS NOTE ADULT - ATTENDING COMMENTS
11. Hypokalemia follow supplement as needed  12.  Leukocytosis repeat ct w contrast no etiology for leukocytosis  13. Right middle lobe mucoid impaction no treatment needed at this time  14. Urinary retention imaging of spine schneider dcd follow PVR  15. HTN follow BP contin current meds    Gamal Martinez MD, FAAP  office [phone]  cell [phone]

## 2019-10-23 NOTE — PROGRESS NOTE ADULT - SUBJECTIVE AND OBJECTIVE BOX
INTERVAL HPI/OVERNIGHT EVENTS:  Patient was aroused for assessment, indicated she was OK and said she ate oatmeal for breakfast earlier     MEDICATIONS  (STANDING):  amLODIPine   Tablet 5 milliGRAM(s) Oral daily  chlorhexidine 2% Cloths 1 Application(s) Topical daily  docusate sodium 100 milliGRAM(s) Oral daily  folic acid 1 milliGRAM(s) Oral daily  furosemide    Tablet 20 milliGRAM(s) Oral daily  metoprolol succinate ER 25 milliGRAM(s) Oral daily  nortriptyline 25 milliGRAM(s) Oral daily  pantoprazole    Tablet 40 milliGRAM(s) Oral two times a day  polyethylene glycol 3350 17 Gram(s) Oral two times a day  senna 2 Tablet(s) Oral two times a day  sodium chloride 0.9%. 1000 milliLiter(s) (75 mL/Hr) IV Continuous <Continuous>    MEDICATIONS  (PRN):  acetaminophen    Suspension .. 650 milliGRAM(s) Oral every 4 hours PRN Temp greater or equal to 38C (100.4F), Moderate Pain (4 - 6)  bisacodyl Suppository 10 milliGRAM(s) Rectal daily PRN Constipation  ondansetron Injectable 4 milliGRAM(s) IV Push every 6 hours PRN Nausea and/or Vomiting  simethicone drops 80 milliGRAM(s) Oral two times a day PRN Gas      Allergies    penicillin (Unknown)    Intolerances        Review of Systems:    General:  No fevers or chills reported    ENT:  denies dysphagia  CV:  No chest  pain  Resp:  No dyspnea, cough, or wheezing  GI:  denies any abdominal discomfort , denies nausea or vomiting         Vital Signs Last 24 Hrs  T(C): 36.5 (23 Oct 2019 07:31), Max: 37.1 (23 Oct 2019 00:08)  T(F): 97.7 (23 Oct 2019 07:31), Max: 98.8 (23 Oct 2019 00:08)  HR: 99 (23 Oct 2019 07:31) (99 - 108)  BP: 130/85 (23 Oct 2019 07:31) (130/85 - 146/74)  BP(mean): --  RR: 18 (23 Oct 2019 07:31) (18 - 18)  SpO2: 96% (23 Oct 2019 07:31) (94% - 96%)    PHYSICAL EXAM:    Constitutional: frail elderly female in NAD, non toxic   Neck:  supple  Respiratory: anterior chest wall clear to auscultation  Cardiovascular: S1 and S2, RRR  Gastrointestinal: mild distention/ unchanged + hypoactive bowel sounds   Extremities: No peripheral edema, or cyanosis  Skin: No visible rashes      LABS:                        8.1    14.74 )-----------( 484      ( 23 Oct 2019 14:15 )             25.6     10-23  Hemoglobin: 8.1 g/dL <L> [11.5 - 15.5] (10-23 @ 14:15)  Hemoglobin: 8.4 g/dL <L> [11.5 - 15.5] (10-22 @ 11:02)  Hemoglobin: 8.6 g/dL <L> [11.5 - 15.5] (10-21 @ 09:13)              133<L>  |  91<L>  |  13  ----------------------------<  138<H>  3.7   |  29  |  0.42<L>    Ca    9.4      23 Oct 2019 10:30              RADIOLOGY & ADDITIONAL TESTS:  < from: CT Abdomen and Pelvis w/ Oral Cont and w/ IV Cont (10.22.19 @ 22:45) >  EXAM:  CT ABDOMEN AND PELVIS OC IC                            PROCEDURE DATE:  10/22/2019            INTERPRETATION:  CLINICAL INFORMATION: Patient with breast cancer with   metastatic osseous disease complaining of abdominal pain and distention..    COMPARISON: CT abdomen and pelvis 10/14/2019    PROCEDURE:   CT of the Abdomen and Pelvis was performed with intravenous contrast.   Intravenous contrast: 90 ml Omnipaque 350. 10 ml discarded.  Oral contrast: positive contrast was administered.  Sagittal and coronal reformats were performed.    FINDINGS:    LOWER CHEST: Small left pleural effusion and trace right pleural   effusion. Bibasilar subsegmental atelectasis. Atherosclerotic   calcifications of coronary arteries.    LIVER: Indeterminate right hepatic lobe hypodense lesion measuring 2.0   cm, unchanged from prior study. An additional subcapsular lesion in   segment 3 measuring 1.1 cm (3, 38) is also indeterminate. This lesions   can be further evaluated with liver protocol MRI.  BILE DUCTS: Normal caliber.  GALLBLADDER: Within normal limits.  SPLEEN: Within normal limits.  PANCREAS: Within normal limits.  ADRENALS: Within normal limits.  KIDNEYS/URETERS: Bilateral renal cysts. Mild right hydronephrosis.   BLADDER: [place] catheterin place.  REPRODUCTIVE ORGANS: Uterus and adnexa within normal limits.    BOWEL: No bowel obstruction. Stool is seen throughout the colon. Appendix   is normal.  PERITONEUM: No ascites.  VESSELS: Atherosclerotic changes.  RETROPERITONEUM/LYMPH NODES: No lymphadenopathy.    ABDOMINAL WALL: Within normal limits.  BONES: Extensive osseous lytic lesions in the bony pelvis, vertebral   bodies and metastatic nodules involving the right and bilateral rib and   LEFT lateral 11th rib.    IMPRESSION:     No bowel obstruction.    Mild right hydronephrosis.    Multiple lytic osseous lesions consistent with metastatic disease.    Indeterminate liver lesions.                      < end of copied text >

## 2019-10-23 NOTE — PROGRESS NOTE ADULT - ASSESSMENT
89 yr old female with hx breast cancer, who had been taking NSAIDS for recent herpes zoster Her labs were noted for anemia, she had recent endoscopy 9/18/19 for this anemia noted for hiatal hernia, gastritis with superficial ulcerations and erosions in the stomach and duodenum. She was found to be H. pylori +, no treatment per family request. Hgb and Hct essentially mildly decreased with no report of active GI blood loss . She is on PPI   A CT dated 10/14/19  to further evaluate abdominal distention (unchanged) did not report any bowel distention or ascites, but it did reported lytic osseous lesions suspicious for metastatic disease.  Patient had bone scan that reported metastatic disease to the mid and lower thoracic spine, left lower  rib / pelvis and prox right femur . Per chart review a TLSO brace is being ordered for patient   Heme-Onc following       Recommendations:    Continue PPI BID   Encourage po diet as tolerated   Monitor labs and trends as ordered   Family to establish goals of care for this 89 patient with likely findings suggestive of metastatic disease        TINO Rangel  Coverage for [facility]  [phone]     Call [phone] after hours

## 2019-10-24 LAB
ANION GAP SERPL CALC-SCNC: 13 MMOL/L — SIGNIFICANT CHANGE UP (ref 5–17)
BUN SERPL-MCNC: 18 MG/DL — SIGNIFICANT CHANGE UP (ref 7–23)
CALCIUM SERPL-MCNC: 9.9 MG/DL — SIGNIFICANT CHANGE UP (ref 8.4–10.5)
CHLORIDE SERPL-SCNC: 94 MMOL/L — LOW (ref 96–108)
CO2 SERPL-SCNC: 27 MMOL/L — SIGNIFICANT CHANGE UP (ref 22–31)
CREAT SERPL-MCNC: 0.41 MG/DL — LOW (ref 0.5–1.3)
GLUCOSE BLDC GLUCOMTR-MCNC: 160 MG/DL — HIGH (ref 70–99)
GLUCOSE BLDC GLUCOMTR-MCNC: 212 MG/DL — HIGH (ref 70–99)
GLUCOSE SERPL-MCNC: 141 MG/DL — HIGH (ref 70–99)
HCT VFR BLD CALC: 26.1 % — LOW (ref 34.5–45)
HGB BLD-MCNC: 8.1 G/DL — LOW (ref 11.5–15.5)
MCHC RBC-ENTMCNC: 27.7 PG — SIGNIFICANT CHANGE UP (ref 27–34)
MCHC RBC-ENTMCNC: 31 GM/DL — LOW (ref 32–36)
MCV RBC AUTO: 89.4 FL — SIGNIFICANT CHANGE UP (ref 80–100)
PLATELET # BLD AUTO: 561 K/UL — HIGH (ref 150–400)
POTASSIUM SERPL-MCNC: 3.9 MMOL/L — SIGNIFICANT CHANGE UP (ref 3.5–5.3)
POTASSIUM SERPL-SCNC: 3.9 MMOL/L — SIGNIFICANT CHANGE UP (ref 3.5–5.3)
RBC # BLD: 2.92 M/UL — LOW (ref 3.8–5.2)
RBC # FLD: 14.7 % — HIGH (ref 10.3–14.5)
SODIUM SERPL-SCNC: 134 MMOL/L — LOW (ref 135–145)
WBC # BLD: 18.8 K/UL — HIGH (ref 3.8–10.5)
WBC # FLD AUTO: 18.8 K/UL — HIGH (ref 3.8–10.5)

## 2019-10-24 PROCEDURE — 73552 X-RAY EXAM OF FEMUR 2/>: CPT | Mod: 26,50

## 2019-10-24 PROCEDURE — 99233 SBSQ HOSP IP/OBS HIGH 50: CPT

## 2019-10-24 PROCEDURE — 73523 X-RAY EXAM HIPS BI 5/> VIEWS: CPT | Mod: 26

## 2019-10-24 RX ADMIN — NORTRIPTYLINE HYDROCHLORIDE 25 MILLIGRAM(S): 10 CAPSULE ORAL at 21:39

## 2019-10-24 RX ADMIN — SODIUM CHLORIDE 75 MILLILITER(S): 9 INJECTION INTRAMUSCULAR; INTRAVENOUS; SUBCUTANEOUS at 07:46

## 2019-10-24 RX ADMIN — PANTOPRAZOLE SODIUM 40 MILLIGRAM(S): 20 TABLET, DELAYED RELEASE ORAL at 18:07

## 2019-10-24 RX ADMIN — Medication 20 MILLIGRAM(S): at 05:47

## 2019-10-24 RX ADMIN — SENNA PLUS 2 TABLET(S): 8.6 TABLET ORAL at 05:47

## 2019-10-24 RX ADMIN — SENNA PLUS 2 TABLET(S): 8.6 TABLET ORAL at 18:07

## 2019-10-24 RX ADMIN — CHLORHEXIDINE GLUCONATE 1 APPLICATION(S): 213 SOLUTION TOPICAL at 12:09

## 2019-10-24 RX ADMIN — PANTOPRAZOLE SODIUM 40 MILLIGRAM(S): 20 TABLET, DELAYED RELEASE ORAL at 05:47

## 2019-10-24 RX ADMIN — Medication 650 MILLIGRAM(S): at 09:23

## 2019-10-24 RX ADMIN — Medication 25 MILLIGRAM(S): at 05:47

## 2019-10-24 RX ADMIN — Medication 650 MILLIGRAM(S): at 07:39

## 2019-10-24 RX ADMIN — Medication 1 MILLIGRAM(S): at 12:10

## 2019-10-24 RX ADMIN — AMLODIPINE BESYLATE 5 MILLIGRAM(S): 2.5 TABLET ORAL at 05:47

## 2019-10-24 NOTE — CONSULT NOTE ADULT - SUBJECTIVE AND OBJECTIVE BOX
89F hx Breast ca (dx 2017, s/p lumpectomy) admitted with anemia presumed from UGIB (EGD 1m ago for gastric ulcer from NSAIDs due to shingles). CT scan obtained of abdomen/pelvis with concerns for acute abdomen. Incidental R proximal femur lesion and multiple osseous mets noted. Bone scan was obtained with +uptake in R proximal femur. Ortho consulted for management recommendations. History obtained from grandson primarily (medical resident here at [facility]). States patient had been ambulating without pain until this admission. Has not complained of any R hip pain. Has been ambulatory with occasional use of cane. During exam/history, patient somnolent without complaints. Denies any R hip pain. Patient denies numbness/tingling/burning in the RLE.      PAST MEDICAL & SURGICAL HISTORY:  Unsteady gait  Osteopenia  Injury of head, subsequent encounter: s/p fall 2 years ago   had bleed into area now ok.  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, estrogen receptor positive, unspecified site of breast: ER/MD positive  HER Negative (per family)  Polymyalgia rheumatica  CAD (coronary artery disease)  Bleeding Duodenal Ulcer: resolved several years ago  Age-Related Hearing Loss: wears hearing aids  Hypercholesteremia  HTN (Hypertension)  Atrial Fibrillation  Closed fracture of patella: left   surgery with pins/screws  4- 5 years ago  S/P colonoscopy: 8 years ago negative  S/P breast biopsy, left  Atherosclerotic Coronary Vascular Disease: coronary stent placement in 2004  Stented Coronary Artery: PCI X 1    MEDICATIONS  (STANDING):  amLODIPine   Tablet 5 milliGRAM(s) Oral daily  chlorhexidine 2% Cloths 1 Application(s) Topical daily  docusate sodium 100 milliGRAM(s) Oral daily  folic acid 1 milliGRAM(s) Oral daily  furosemide    Tablet 20 milliGRAM(s) Oral daily  metoprolol succinate ER 25 milliGRAM(s) Oral daily  nortriptyline 25 milliGRAM(s) Oral daily  pantoprazole    Tablet 40 milliGRAM(s) Oral two times a day  polyethylene glycol 3350 17 Gram(s) Oral two times a day  senna 2 Tablet(s) Oral two times a day  sodium chloride 0.9%. 1000 milliLiter(s) (75 mL/Hr) IV Continuous <Continuous>    MEDICATIONS  (PRN):  acetaminophen    Suspension .. 650 milliGRAM(s) Oral every 4 hours PRN Temp greater or equal to 38C (100.4F), Moderate Pain (4 - 6)  bisacodyl Suppository 10 milliGRAM(s) Rectal daily PRN Constipation  ondansetron Injectable 4 milliGRAM(s) IV Push every 6 hours PRN Nausea and/or Vomiting  simethicone drops 80 milliGRAM(s) Oral two times a day PRN Gas    Allergies    penicillin (Unknown)    Intolerances        T(C): 36.3 (10-23-19 @ 21:51), Max: 36.7 (10-23-19 @ 15:45)  HR: 99 (10-23-19 @ 21:51) (99 - 105)  BP: 135/72 (10-23-19 @ 21:51) (130/85 - 138/51)  RR: 18 (10-23-19 @ 21:51) (18 - 18)  SpO2: 97% (10-23-19 @ 21:51) (94% - 97%)  Wt(kg): --    PE   RLE:  Skin intact; No ecchymosis/soft tissue swelling  Compartments soft; very mild TTP about anterior groin, otherwise nontender about hip. No TTP to thigh/knee/leg/ankle/foot   Full painless PROM  Mild anterior hip pain with AROM  Able to SLR; - Log Roll/Heel Strike  pain with axial load  Motor intact GS/TA/FHL/EHL  SILT L2-S1  DP/PT pulses 2+    LLE/BUE:   No bony TTP; Good ROM w/o pain; Exam Unremarkable    Imaging:  XR pending  CT scan with questionable small lytic lesion in proximal femur.   bone scan + with uptake in R proximal femur.     89F with hx breast ca, new mets on recent imaging diffusely about pelvis, vertebral bodies, thoracic spine and R proximal femur    Pt with newly found mets as noted. Most notably about R proximal femur.   Pt had been ambulatory up until admission with occasional use of a cane and no R hip pain.   Currently without any complaints of R hip pain.   On exam, able to illicit very mild R groin pain with active hip flexion and active load. Otherwise, benign R hip exam.   No pain about the thigh.   No complaints of rest pain.   CT and bone scan positive for R proximal femur lesion.   Obtain XR pelvis, bilateral hips and femurs.   Will review imaging and DW Dr. Rios regarding weight bearing status.

## 2019-10-24 NOTE — CONSULT NOTE ADULT - ATTENDING COMMENTS
89 year old with metastatic breast cancer with multiple lytic lesions on CT. No bony destruction on plain xray. No complaints of pain when ambulating.  Discussed risk of fracture with patient and possibility of prophylactic stabilization. I do not think surgery warranted at this time given lack of pain and no bony destruction on plain xray.  WBAT  F/u if she develops any extremity or groin pain.
Pt seen and examined. MRI spine reviewed, demonstrates abnormalities in T8, T9 vertebrae  Pt tender to palpation in mid thoracic spine  Rx TLSO
no evidence of infection or cerumen impaction, appears that hearing aid may be malfunctioning but this must be serviced by audiology services who are only available for this as outpatient.

## 2019-10-24 NOTE — PROGRESS NOTE ADULT - SUBJECTIVE AND OBJECTIVE BOX
Subjective: Still some mild intermittent confusion complains of some abdominal discomfort on occasion still some mild distention has been getting up out of bed into the chair somewhat no fever    Exam:  Gen: NAD  Neck: no JVD  Chest: normal shape and expansion  Heart: RRR with 2/6 systolic murmur  Lungs: CTAB  Abdomen: Soft, Minimally distended and soft with mild diffuse abdominal tenderness no rebound tenderness, no guarding, no rigidity, no HSM, normoactive BS x 4  Ext: no CCE  Skin: Healing lesions on left torso from shingles        Vital Signs Last 24 Hrs  T(C): 36.3 (23 Oct 2019 21:51), Max: 36.7 (23 Oct 2019 15:45)  T(F): 97.4 (23 Oct 2019 21:51), Max: 98.1 (23 Oct 2019 15:45)  HR: 88 (24 Oct 2019 05:40) (88 - 105)  BP: 149/77 (24 Oct 2019 05:40) (135/72 - 149/77)  BP(mean): --  RR: 18 (23 Oct 2019 21:51) (18 - 18)  SpO2: 97% (23 Oct 2019 21:51) (94% - 97%)    Daily     Daily Weight in k (23 Oct 2019 09:39)    I&O's Detail    23 Oct 2019 07:01  -  24 Oct 2019 07:00  --------------------------------------------------------  IN:    Oral Fluid: 340 mL    sodium chloride 0.9%.: 900 mL  Total IN: 1240 mL    OUT:    Indwelling Catheter - Urethral: 2100 mL  Total OUT: 2100 mL    Total NET: -860 mL          Daily 38 (10-23 @ 09:39)    CAPILLARY BLOOD GLUCOSE      POCT Blood Glucose.: 225 mg/dL (23 Oct 2019 21:52)  POCT Blood Glucose.: 175 mg/dL (23 Oct 2019 12:08)  POCT Blood Glucose.: 186 mg/dL (23 Oct 2019 09:15)      MEDICATIONS  (STANDING):  amLODIPine   Tablet 5 milliGRAM(s) Oral daily  chlorhexidine 2% Cloths 1 Application(s) Topical daily  docusate sodium 100 milliGRAM(s) Oral daily  folic acid 1 milliGRAM(s) Oral daily  furosemide    Tablet 20 milliGRAM(s) Oral daily  metoprolol succinate ER 25 milliGRAM(s) Oral daily  nortriptyline 25 milliGRAM(s) Oral daily  pantoprazole    Tablet 40 milliGRAM(s) Oral two times a day  polyethylene glycol 3350 17 Gram(s) Oral two times a day  senna 2 Tablet(s) Oral two times a day  sodium chloride 0.9%. 1000 milliLiter(s) (75 mL/Hr) IV Continuous <Continuous>    MEDICATIONS  (PRN):  acetaminophen    Suspension .. 650 milliGRAM(s) Oral every 4 hours PRN Temp greater or equal to 38C (100.4F), Moderate Pain (4 - 6)  bisacodyl Suppository 10 milliGRAM(s) Rectal daily PRN Constipation  ondansetron Injectable 4 milliGRAM(s) IV Push every 6 hours PRN Nausea and/or Vomiting  simethicone drops 80 milliGRAM(s) Oral two times a day PRN Gas      Labs:                        8.1    14.74 )-----------( 484      ( 23 Oct 2019 14:15 )             25.6   baso x      eos x      imm gran x      lymph x      mono x      poly x                            8.4    15.41 )-----------( 503      ( 22 Oct 2019 11:02 )             26.6   baso 0.3    eos 0.8    imm gran 1.0    lymph 6.2    mono 8.7    poly 83.0                         8.6    15.84 )-----------( 467      ( 21 Oct 2019 09:13 )             27.2   baso 0.0    eos 0.0    imm gran x      lymph 10.4   mono 5.2    poly 83.5     10-23 @ 10:30    133<L>  |  91<L>  |  13  ----------------------------<  138<H>  3.7   |  29  |  0.42<L>    10-22 @ 09:45    133<L>  |  93<L>  |  16  ----------------------------<  170<H>  4.2   |  31  |  0.46<L>    10-21 @ 07:41    133<L>  |  95<L>  |  20  ----------------------------<  162<H>  3.9   |  28  |  0.45<L>    10-20 @ 05:46    134<L>  |  97  |  19  ----------------------------<  129<H>  3.9   |  24  |  0.43<L>      Ca    9.4      10-23 @ 10:30  Ca    9.3      10-22 @ 09:45  Ca    9.6      10-21 @ 07:41  Ca    8.9      10-20 @ 05:46

## 2019-10-24 NOTE — PROGRESS NOTE ADULT - ASSESSMENT
89 yr old female with hx breast cancer. She had recent endoscopy 9/18/19 for anemia noted for hiatal hernia, gastritis with superficial ulcerations and erosions in the stomach and duodenum. She was previously taking NSAIDs for discomfort associated with Herpes Zoster . On the upper endoscopy she was found to be H. pylori +, no treatment per family request. Hgb and Hct essentially stable now with no report of active GI blood loss . She continues on a PPI   Patient had a CT dated 10/14/19  to further evaluate mild abdominal distention , no acute GI process reported > the CT did reported lytic osseous lesions suspicious for metastatic disease.  Patient had bone scan that reported metastatic disease to the mid and lower thoracic spine, left lower  rib / pelvis and prox right femur   Heme-Onc following       Recommendations:    Continue PPI BID   Encourage meals  as tolerated when alert and attentive to po intake    Monitor labs and trends as ordered   Family to establish goals of care for this 95 patient with CT findings suggestive of metastatic disease        RANDY Rangel-BC  Coverage for [facility]  [phone]

## 2019-10-24 NOTE — PROGRESS NOTE ADULT - SUBJECTIVE AND OBJECTIVE BOX
INTERVAL HPI/OVERNIGHT EVENTS:  Patient was briefly aroused this morning, keeping her eyes closed she was nodding in response to my questions, indicating she is OK     MEDICATIONS  (STANDING):  amLODIPine   Tablet 5 milliGRAM(s) Oral daily  chlorhexidine 2% Cloths 1 Application(s) Topical daily  docusate sodium 100 milliGRAM(s) Oral daily  folic acid 1 milliGRAM(s) Oral daily  furosemide    Tablet 20 milliGRAM(s) Oral daily  metoprolol succinate ER 25 milliGRAM(s) Oral daily  nortriptyline 25 milliGRAM(s) Oral daily  pantoprazole    Tablet 40 milliGRAM(s) Oral two times a day  polyethylene glycol 3350 17 Gram(s) Oral two times a day  senna 2 Tablet(s) Oral two times a day  sodium chloride 0.9%. 1000 milliLiter(s) (75 mL/Hr) IV Continuous <Continuous>    MEDICATIONS  (PRN):  acetaminophen    Suspension .. 650 milliGRAM(s) Oral every 4 hours PRN Temp greater or equal to 38C (100.4F), Moderate Pain (4 - 6)  bisacodyl Suppository 10 milliGRAM(s) Rectal daily PRN Constipation  ondansetron Injectable 4 milliGRAM(s) IV Push every 6 hours PRN Nausea and/or Vomiting  simethicone drops 80 milliGRAM(s) Oral two times a day PRN Gas      Allergies    penicillin (Unknown)    Intolerances        Review of Systems:    General:  No fevers or chills    ENT: denies dysphagia  CV:  No chest pain  Resp:  No dyspnea, cough,or  wheezing  GI: denies any nausea or vomiting,   BM yesterday ( per documentation)         Vital Signs Last 24 Hrs  T(C): 36.4 (24 Oct 2019 08:51), Max: 36.7 (23 Oct 2019 15:45)  T(F): 97.5 (24 Oct 2019 08:51), Max: 98.1 (23 Oct 2019 15:45)  HR: 93 (24 Oct 2019 08:51) (88 - 105)  BP: 116/65 (24 Oct 2019 08:51) (116/65 - 149/77)  BP(mean): --  RR: 18 (24 Oct 2019 08:51) (18 - 18)  SpO2: 96% (24 Oct 2019 08:51) (94% - 97%)    PHYSICAL EXAM:    Constitutional:  frail elderly female, non toxic and in NAD   Respiratory: anterior chest wall clear to auscultation   Cardiovascular: S1 and S2, RRR  Gastrointestinal: soft, mild distended girth unchanged   Extremities: No peripheral edema, or cyanosis  Skin: No visible rashes      LABS:                        8.1    18.80 )-----------( 561      ( 24 Oct 2019 13:29 )             26.1     10-24  Hemoglobin: 8.1 g/dL <L> [11.5 - 15.5] (10-24 @ 13:29)  Hemoglobin: 8.1 g/dL <L> [11.5 - 15.5] (10-23 @ 14:15)  Hemoglobin: 8.4 g/dL <L> [11.5 - 15.5] (10-22 @ 11:02)              134<L>  |  94<L>  |  18  ----------------------------<  141<H>  3.9   |  27  |  0.41<L>    Ca    9.9      24 Oct 2019 09:11              RADIOLOGY & ADDITIONAL TESTS:

## 2019-10-24 NOTE — PROGRESS NOTE ADULT - ATTENDING COMMENTS
11. Hypokalemia follow supplement as needed  12.  Leukocytosis repeat ct w contrast no etiology for leukocytosis follow trend of WBC  13. Right middle lobe mucoid impaction no treatment needed at this time  14. Urinary retention imaging of spine schneider dcd follow PVR  15. HTN follow BP contin current meds    Gamal Martinez MD, FAAP  office [phone]  cell [phone]

## 2019-10-25 LAB
CULTURE RESULTS: SIGNIFICANT CHANGE UP
CULTURE RESULTS: SIGNIFICANT CHANGE UP
GLUCOSE BLDC GLUCOMTR-MCNC: 177 MG/DL — HIGH (ref 70–99)
GLUCOSE BLDC GLUCOMTR-MCNC: 243 MG/DL — HIGH (ref 70–99)
SPECIMEN SOURCE: SIGNIFICANT CHANGE UP
SPECIMEN SOURCE: SIGNIFICANT CHANGE UP

## 2019-10-25 PROCEDURE — 99231 SBSQ HOSP IP/OBS SF/LOW 25: CPT

## 2019-10-25 RX ADMIN — SENNA PLUS 2 TABLET(S): 8.6 TABLET ORAL at 18:40

## 2019-10-25 RX ADMIN — CHLORHEXIDINE GLUCONATE 1 APPLICATION(S): 213 SOLUTION TOPICAL at 12:14

## 2019-10-25 RX ADMIN — POLYETHYLENE GLYCOL 3350 17 GRAM(S): 17 POWDER, FOR SOLUTION ORAL at 04:41

## 2019-10-25 RX ADMIN — PANTOPRAZOLE SODIUM 40 MILLIGRAM(S): 20 TABLET, DELAYED RELEASE ORAL at 18:40

## 2019-10-25 RX ADMIN — Medication 1 MILLIGRAM(S): at 12:08

## 2019-10-25 RX ADMIN — SENNA PLUS 2 TABLET(S): 8.6 TABLET ORAL at 04:41

## 2019-10-25 RX ADMIN — Medication 650 MILLIGRAM(S): at 19:15

## 2019-10-25 RX ADMIN — Medication 650 MILLIGRAM(S): at 12:00

## 2019-10-25 RX ADMIN — PANTOPRAZOLE SODIUM 40 MILLIGRAM(S): 20 TABLET, DELAYED RELEASE ORAL at 04:41

## 2019-10-25 RX ADMIN — POLYETHYLENE GLYCOL 3350 17 GRAM(S): 17 POWDER, FOR SOLUTION ORAL at 18:47

## 2019-10-25 RX ADMIN — Medication 100 MILLIGRAM(S): at 12:00

## 2019-10-25 RX ADMIN — Medication 25 MILLIGRAM(S): at 04:41

## 2019-10-25 RX ADMIN — Medication 20 MILLIGRAM(S): at 04:41

## 2019-10-25 RX ADMIN — AMLODIPINE BESYLATE 5 MILLIGRAM(S): 2.5 TABLET ORAL at 04:41

## 2019-10-25 RX ADMIN — SODIUM CHLORIDE 75 MILLILITER(S): 9 INJECTION INTRAMUSCULAR; INTRAVENOUS; SUBCUTANEOUS at 15:41

## 2019-10-25 RX ADMIN — Medication 650 MILLIGRAM(S): at 18:38

## 2019-10-25 RX ADMIN — Medication 650 MILLIGRAM(S): at 13:45

## 2019-10-25 NOTE — PHYSICAL THERAPY INITIAL EVALUATION ADULT - PERTINENT HX OF CURRENT PROBLEM, REHAB EVAL
89 yo female with pmh htn. hld, dm, gastric ulcer, anemia presenting from PMDs office for evaluation of worsening anemia. Pt recent hospitalized 3 weeks ago for GIB and anemia hgb in 7, endoscopy done showing gastric ulcers.  This was in setting of increased NSAID use to control pain from shingles exacerbation. Pt had repeat hgb today which showed hgb again 7.1. pt endorses generalized weakness and epigastric abd pain.
89F w/ hx of DM2, Breast cancer, gastric ulcer, afib not on AC due to falls risk, HTN, HLD p/w worsening weakness, anemia and borderline fevers. CT scan Abnormal bone scan. Metastatic disease involving the mid and lower thoracic spine, left lower rib, the pelvis, and proximal right femur.

## 2019-10-25 NOTE — PROGRESS NOTE ADULT - ASSESSMENT
89 yr old female with hx breast cancer. She had recent endoscopy 9/18/19 for anemia noted for hiatal hernia, gastritis with superficial ulcerations and erosions in the stomach and duodenum. She was previously taking NSAIDs for discomfort associated with Herpes Zoster . On the upper endoscopy she was found to be H. pylori +, no treatment per family request. Hgb and Hct essentially stable now with no report of active GI blood loss . She continues on a PPI     Patient had a CT dated 10/14/19  to further evaluate mild abdominal distention ( unchanged) per report no acute GI process reported, though the CT did reported lytic osseous lesions suspicious for metastatic disease.  Patient had bone scan that reported metastatic disease to the mid and lower thoracic spine, left lower  rib / pelvis and prox right femur   Heme-Onc following       Recommendations:    Continue PPI BID   Encourage meals as tolerated when alert and attentive to po intake    Monitor labs and trends as ordered   Goals of care to be determined considering patients advanced age of 89 and comorbidities       TINO Rangel  Coverage for [facility]  [phone]   After hours and weekends call [phone] 89 yr old female with hx breast cancer. She had recent endoscopy 9/18/19 for anemia noted for hiatal hernia, gastritis with superficial ulcerations and erosions in the stomach and duodenum. She was previously taking NSAIDs for discomfort associated with Herpes Zoster . On the upper endoscopy she was found to be H. pylori +, no treatment per family request. Hgb and Hct essentially stable now with no report of active GI blood loss . She continues on a PPI     Patient had a CT dated 10/14/19  to further evaluate mild abdominal distention ( unchanged) per report no acute GI process reported, though the CT did reported lytic osseous lesions suspicious for metastatic disease.  Patient had bone scan that reported metastatic disease to the mid and lower thoracic spine, left lower  rib / pelvis and prox right femur   Heme-Onc following       Recommendations:    Continue PPI BID               Encourage meals as tolerated when alert and attentive to po intake    Monitor labs and trends as ordered   Goals of care to be determined considering patients advanced age of 89 and comorbidities       TINO Rangel  Coverage for [facility]  [phone]   After hours and weekends call [phone]

## 2019-10-25 NOTE — PHYSICAL THERAPY INITIAL EVALUATION ADULT - GAIT DEVIATIONS NOTED, PT EVAL
increased time in double stance/decreased stride length/decreased velocity of limb motion/decreased step length/decreased manjula/decreased weight-shifting ability

## 2019-10-25 NOTE — PHYSICAL THERAPY INITIAL EVALUATION ADULT - CRITERIA FOR SKILLED THERAPEUTIC INTERVENTIONS
impairments found functional limitations in following categories/impairments found/anticipated discharge recommendation

## 2019-10-25 NOTE — PHYSICAL THERAPY INITIAL EVALUATION ADULT - IMPAIRMENTS FOUND, PT EVAL
posture/joint integrity and mobility/muscle strength/sensory integrity/gait, locomotion, and balance/gross motor

## 2019-10-25 NOTE — PROGRESS NOTE ADULT - SUBJECTIVE AND OBJECTIVE BOX
Subjective: Still some mild confusion, some generalized weakness, occasional pain.  Patient states gets pain in the left lower rib area [place] removed and able to void    Exam:  Gen: NAD  Neck: no JVD  Chest: normal shape and expansion  Heart: RRR with 2/6 systolic murmur  Lungs: CTAB  Abdomen: Soft, Minimally distended and soft with mild diffuse abdominal tenderness no rebound tenderness, no guarding, no rigidity, no HSM, normoactive BS x 4  Ext: no CCE  Skin: Healing lesions on left torso from shingles        Vital Signs Last 24 Hrs  T(C): 36.3 (25 Oct 2019 07:22), Max: 36.8 (24 Oct 2019 16:47)  T(F): 97.3 (25 Oct 2019 07:22), Max: 98.2 (24 Oct 2019 16:47)  HR: 106 (25 Oct 2019 07:22) (101 - 109)  BP: 129/74 (25 Oct 2019 07:22) (129/74 - 157/73)  BP(mean): --  RR: 18 (25 Oct 2019 07:22) (16 - 18)  SpO2: 96% (25 Oct 2019 07:22) (96% - 100%)    Daily     Daily     I&O's Detail    24 Oct 2019 07:01  -  25 Oct 2019 07:00  --------------------------------------------------------  IN:    Oral Fluid: 670 mL    sodium chloride 0.9%.: 900 mL  Total IN: 1570 mL    OUT:    Indwelling Catheter - Urethral: 450 mL    Intermittent Catheterization - Urethral: 400 mL  Total OUT: 850 mL    Total NET: 720 mL          Daily 38 (10-23 @ 09:39)    CAPILLARY BLOOD GLUCOSE      POCT Blood Glucose.: 177 mg/dL (25 Oct 2019 09:00)  POCT Blood Glucose.: 160 mg/dL (24 Oct 2019 13:50)      MEDICATIONS  (STANDING):  amLODIPine   Tablet 5 milliGRAM(s) Oral daily  chlorhexidine 2% Cloths 1 Application(s) Topical daily  docusate sodium 100 milliGRAM(s) Oral daily  folic acid 1 milliGRAM(s) Oral daily  furosemide    Tablet 20 milliGRAM(s) Oral daily  metoprolol succinate ER 25 milliGRAM(s) Oral daily  nortriptyline 25 milliGRAM(s) Oral daily  pantoprazole    Tablet 40 milliGRAM(s) Oral two times a day  polyethylene glycol 3350 17 Gram(s) Oral two times a day  senna 2 Tablet(s) Oral two times a day  sodium chloride 0.9%. 1000 milliLiter(s) (75 mL/Hr) IV Continuous <Continuous>    MEDICATIONS  (PRN):  acetaminophen    Suspension .. 650 milliGRAM(s) Oral every 4 hours PRN Temp greater or equal to 38C (100.4F), Moderate Pain (4 - 6)  bisacodyl Suppository 10 milliGRAM(s) Rectal daily PRN Constipation  ondansetron Injectable 4 milliGRAM(s) IV Push every 6 hours PRN Nausea and/or Vomiting  simethicone drops 80 milliGRAM(s) Oral two times a day PRN Gas      Labs:                          8.1    18.80 )-----------( 561      ( 24 Oct 2019 13:29 )             26.1   baso x      eos x      imm gran x      lymph x      mono x      poly x                            8.1    14.74 )-----------( 484      ( 23 Oct 2019 14:15 )             25.6   baso x      eos x      imm gran x      lymph x      mono x      poly x                            8.4    15.41 )-----------( 503      ( 22 Oct 2019 11:02 )             26.6   baso 0.3    eos 0.8    imm gran 1.0    lymph 6.2    mono 8.7    poly 83.0       10-24    134<L>  |  94<L>  |  18  ----------------------------<  141<H>  3.9   |  27  |  0.41<L>    Ca    9.9      24 Oct 2019 09:11

## 2019-10-25 NOTE — PROGRESS NOTE ADULT - ASSESSMENT
89F w/ hx of DM2, Breast cancer, gastric ulcer, afib not on AC due to falls risk, HTN, HLD p/w worsening weakness and anemia, treated for UTI, found to have lytic lesions on CT abdomen    #lytic lesions on CT- with history of Breast cancer Stage 1c mucinous, ER+/TX+/[id number] - s/p lumpectomy- patient had refused any adjuvant hormonal treatment; recently CA 27-29 with slight trend up though still normal; mammogram/US 10/30/18- negative  - likely metastatic breast cancer  - CT chest without lung lesions; MRI brain no mets; bone scan with multiple mets- mid/lower thoracic spine, left lower rib, pelvis, proximal right femur without cortical disruption- s/p xrays femur/hips- Ortho input noted, WBAT  - MRI spine no cord compression  - CT abd also with indeterminate liver lesions  - SPEP/IF - IF IgG lambda band; K/L ratio is normal, M spike with 0.2 g/dl gamma migrating paraprotein- VERY small M spike, likely incidental finding  - ideally obtain biopsy for further eval/diagnosis but consider empiric rx with aromatase inhibitor in this lady with advanced age and now decreasing status-- Dr. Martinez discussing with HCP    #Anemia, normocytic- likely multifactorial- AOCD + GI loss with guaiac positive stool  - recent EGD with gastritis/duodenal ulcer, guaiac + this admission- on PPI  - s/p 1 unit PRBC 10/11  - iron studies AOCD; B12 level normal; Folate slightly low- on replacement  - Hg with trend down- monitor    #thrombocytosis- likely reactive in setting of acute illness/ UTI/ recent GIB  - platelet count had been normal 2018, monitor    #hypercalcemia- likely secondary to malignancy +/- volume depletion  - now off HCTZ  - on hydration  - PTH low, PTHrP low  - Ca improved        d/w NP

## 2019-10-25 NOTE — PHYSICAL THERAPY INITIAL EVALUATION ADULT - TRANSFER SAFETY CONCERNS NOTED: SIT/STAND, REHAB EVAL
inability to maintain weight-bearing restrictions w/o assist/decreased balance during turns/losing balance/decreased weight-shifting ability

## 2019-10-25 NOTE — PROGRESS NOTE ADULT - ATTENDING COMMENTS
11. Hypokalemia follow supplement as needed  12. Leukocytosis repeat White blood cell count trending upward somewhat no obvious source or sign of infection.  Hold off on antibiotics at this time  13. Right middle lobe mucoid impaction no treatment needed at this time  14. Urinary retention schneider dcd follow PVR  15. HTN follow BP contin current meds    Gamal Martinez MD, FAAP  office [phone]  cell [phone]

## 2019-10-25 NOTE — PROGRESS NOTE ADULT - SUBJECTIVE AND OBJECTIVE BOX
INTERVAL HPI/OVERNIGHT EVENTS:  Patient was asleep this morning, aroused for assessment, denies any GI complaints      MEDICATIONS  (STANDING):  amLODIPine   Tablet 5 milliGRAM(s) Oral daily  chlorhexidine 2% Cloths 1 Application(s) Topical daily  docusate sodium 100 milliGRAM(s) Oral daily  folic acid 1 milliGRAM(s) Oral daily  furosemide    Tablet 20 milliGRAM(s) Oral daily  metoprolol succinate ER 25 milliGRAM(s) Oral daily  nortriptyline 25 milliGRAM(s) Oral daily  pantoprazole    Tablet 40 milliGRAM(s) Oral two times a day  polyethylene glycol 3350 17 Gram(s) Oral two times a day  senna 2 Tablet(s) Oral two times a day  sodium chloride 0.9%. 1000 milliLiter(s) (75 mL/Hr) IV Continuous <Continuous>    MEDICATIONS  (PRN):  acetaminophen    Suspension .. 650 milliGRAM(s) Oral every 4 hours PRN Temp greater or equal to 38C (100.4F), Moderate Pain (4 - 6)  bisacodyl Suppository 10 milliGRAM(s) Rectal daily PRN Constipation  ondansetron Injectable 4 milliGRAM(s) IV Push every 6 hours PRN Nausea and/or Vomiting  simethicone drops 80 milliGRAM(s) Oral two times a day PRN Gas      Allergies    penicillin (Unknown)    Intolerances        Review of Systems:    General:  No fevers or chills    ENT:  denies any nausea or vomiting , denies  dysphagia  CV:  No chest pain  Resp:  No cough or  wheezing  GI: denies abdominal discomfort       Vital Signs Last 24 Hrs  T(C): 36.6 (25 Oct 2019 13:56), Max: 36.8 (24 Oct 2019 16:47)  T(F): 97.8 (25 Oct 2019 13:56), Max: 98.2 (24 Oct 2019 16:47)  HR: 91 (25 Oct 2019 13:56) (91 - 109)  BP: 122/72 (25 Oct 2019 13:56) (122/72 - 157/73)  BP(mean): --  RR: 18 (25 Oct 2019 13:56) (16 - 18)  SpO2: 95% (25 Oct 2019 13:56) (95% - 100%)    PHYSICAL EXAM:    Constitutional: nontoxic in NAD, frail appearance   Neck:  supple  Respiratory: anterior chest wall clear to auscultation  Cardiovascular: S1 and S2  Gastrointestinal: soft , mild distention unchanged   Extremities: No peripheral edema, or cyanosis  Neurological: A/O x 2  Skin: No visible  rashes      LABS:                        8.1    18.80 )-----------( 561      ( 24 Oct 2019 13:29 )             26.1     10-24    Hemoglobin: 8.1 g/dL <L> [11.5 - 15.5] (10-24 @ 13:29)  Hemoglobin: 8.1 g/dL <L> [11.5 - 15.5] (10-23 @ 14:15)              134<L>  |  94<L>  |  18  ----------------------------<  141<H>  3.9   |  27  |  0.41<L>    Ca    9.9      24 Oct 2019 09:11              RADIOLOGY & ADDITIONAL TESTS:

## 2019-10-25 NOTE — PHYSICAL THERAPY INITIAL EVALUATION ADULT - LIVES WITH, PROFILE
alone/w/ son for the past month alone/Pt lives in home with 7 stairs to entrance, +HR.  Prior to admission pt ambulating with cane however recently increased weakness, so w/ son for the past month.

## 2019-10-25 NOTE — PROGRESS NOTE ADULT - SUBJECTIVE AND OBJECTIVE BOX
patient evaluated for TLSO spinal brace discussed with family and patient's physician   they feel patient will have trouble tolerating the orthosis they want to discuss it further with  orthopedics and physical therapy.

## 2019-10-25 NOTE — PHYSICAL THERAPY INITIAL EVALUATION ADULT - DISCHARGE DISPOSITION, PT EVAL
Pt and pt's son considering subacute/home w/ home PT however Pt and pt's son considering subacute/home w/ assist/home w/ home PT

## 2019-10-25 NOTE — PHYSICAL THERAPY INITIAL EVALUATION ADULT - PRECAUTIONS/LIMITATIONS, REHAB EVAL
During current hospitalization, patient noted to have increasing leukocytosis and treated for presumed UTI./fall precautions
fall precautions

## 2019-10-25 NOTE — PHYSICAL THERAPY INITIAL EVALUATION ADULT - MANUAL MUSCLE TESTING RESULTS, REHAB EVAL
BUE grossly at least 3/5 throughout, BLE grossly at least 3/5 throughout; assessment limited due to history of bone METS/grossly assessed due to

## 2019-10-25 NOTE — PROGRESS NOTE ADULT - SUBJECTIVE AND OBJECTIVE BOX
Chief Complaint: fu    History of Present Illness: no f/c, currently laying upright in bed- with pain earlier- now resolved; no dyspnea, occ cough, no n/v/abd pain, +weakness      MEDICATIONS  (STANDING):  amLODIPine   Tablet 5 milliGRAM(s) Oral daily  chlorhexidine 2% Cloths 1 Application(s) Topical daily  docusate sodium 100 milliGRAM(s) Oral daily  folic acid 1 milliGRAM(s) Oral daily  furosemide    Tablet 20 milliGRAM(s) Oral daily  metoprolol succinate ER 25 milliGRAM(s) Oral daily  nortriptyline 25 milliGRAM(s) Oral daily  pantoprazole    Tablet 40 milliGRAM(s) Oral two times a day  polyethylene glycol 3350 17 Gram(s) Oral two times a day  senna 2 Tablet(s) Oral two times a day  sodium chloride 0.9%. 1000 milliLiter(s) (75 mL/Hr) IV Continuous <Continuous>    MEDICATIONS  (PRN):  acetaminophen    Suspension .. 650 milliGRAM(s) Oral every 4 hours PRN Temp greater or equal to 38C (100.4F), Moderate Pain (4 - 6)  bisacodyl Suppository 10 milliGRAM(s) Rectal daily PRN Constipation  ondansetron Injectable 4 milliGRAM(s) IV Push every 6 hours PRN Nausea and/or Vomiting  simethicone drops 80 milliGRAM(s) Oral two times a day PRN Gas      Allergies    penicillin (Unknown)    Intolerances        Vital Signs Last 24 Hrs  T(C): 36.6 (25 Oct 2019 13:56), Max: 36.8 (24 Oct 2019 16:47)  T(F): 97.8 (25 Oct 2019 13:56), Max: 98.2 (24 Oct 2019 16:47)  HR: 91 (25 Oct 2019 13:56) (91 - 109)  BP: 122/72 (25 Oct 2019 13:56) (122/72 - 157/73)  BP(mean): --  RR: 18 (25 Oct 2019 13:56) (16 - 18)  SpO2: 95% (25 Oct 2019 13:56) (95% - 100%)    PHYSICAL EXAM  General: frail elderly adult in NAD  HEENT: clear oropharynx, anicteric sclera, pink conjunctiva  Neck: supple  CV: normal S1/S2  Lungs: decreased BS, poor effort  Abdomen: soft non-tender sl distended, positive bowel sounds  Ext: no clubbing cyanosis or edema  Skin: no rashes and no petechiae  Lymph Nodes: No LAD in axillae, groin, neck  Neuro: lethargic although answering questions, no focal deficits    LABS:                          8.1    18.80 )-----------( 561      ( 24 Oct 2019 13:29 )             26.1         Mean Cell Volume : 89.4 fl  Mean Cell Hemoglobin : 27.7 pg  Mean Cell Hemoglobin Concentration : 31.0 gm/dL  Auto Neutrophil # : x  Auto Lymphocyte # : x  Auto Monocyte # : x  Auto Eosinophil # : x  Auto Basophil # : x  Auto Neutrophil % : x  Auto Lymphocyte % : x  Auto Monocyte % : x  Auto Eosinophil % : x  Auto Basophil % : x      Serial CBC's  10-24 @ 13:29  Hct-26.1 / Hgb-8.1 / Plat-561 / RBC-2.92 / WBC-18.80  Serial CBC's  10-23 @ 14:15  Hct-25.6 / Hgb-8.1 / Plat-484 / RBC-2.92 / WBC-14.74  Serial CBC's  10-22 @ 11:02  Hct-26.6 / Hgb-8.4 / Plat-503 / RBC-3.04 / WBC-15.41      10-24    134<L>  |  94<L>  |  18  ----------------------------<  141<H>  3.9   |  27  |  0.41<L>    Ca    9.9      24 Oct 2019 09:11                        Radiology:  < from: Xray Femur 2 Views, Bilat (10.24.19 @ 01:52) >  Lytic lesions are visualized withinthe pelvis are obscured due to   overlying bowel and are better appreciated on previously performed CT. No   lytic lesion is visualized within the bilateral hips or bilateral femora.   There is moderate bilateral hip joint space narrowing. Partially threaded   screws and cerclage wire traverse the left patella of which the superior   aspect of the cerclage wire appears discontinuous. Partially imaged lower   lumbar discogenic degenerative disease. Soft tissues are intact.    < end of copied text >

## 2019-10-26 LAB
ALBUMIN SERPL ELPH-MCNC: 2.3 G/DL — LOW (ref 3.3–5)
ALP SERPL-CCNC: 207 U/L — HIGH (ref 40–120)
ALT FLD-CCNC: 56 U/L — HIGH (ref 10–45)
ANION GAP SERPL CALC-SCNC: 14 MMOL/L — SIGNIFICANT CHANGE UP (ref 5–17)
AST SERPL-CCNC: 20 U/L — SIGNIFICANT CHANGE UP (ref 10–40)
BILIRUB SERPL-MCNC: 0.3 MG/DL — SIGNIFICANT CHANGE UP (ref 0.2–1.2)
BUN SERPL-MCNC: 14 MG/DL — SIGNIFICANT CHANGE UP (ref 7–23)
CALCIUM SERPL-MCNC: 10 MG/DL — SIGNIFICANT CHANGE UP (ref 8.4–10.5)
CHLORIDE SERPL-SCNC: 94 MMOL/L — LOW (ref 96–108)
CO2 SERPL-SCNC: 28 MMOL/L — SIGNIFICANT CHANGE UP (ref 22–31)
CREAT SERPL-MCNC: 0.39 MG/DL — LOW (ref 0.5–1.3)
GLUCOSE BLDC GLUCOMTR-MCNC: 157 MG/DL — HIGH (ref 70–99)
GLUCOSE BLDC GLUCOMTR-MCNC: 158 MG/DL — HIGH (ref 70–99)
GLUCOSE SERPL-MCNC: 153 MG/DL — HIGH (ref 70–99)
HCT VFR BLD CALC: 24.3 % — LOW (ref 34.5–45)
HGB BLD-MCNC: 7.6 G/DL — LOW (ref 11.5–15.5)
MCHC RBC-ENTMCNC: 27.1 PG — SIGNIFICANT CHANGE UP (ref 27–34)
MCHC RBC-ENTMCNC: 31.3 GM/DL — LOW (ref 32–36)
MCV RBC AUTO: 86.8 FL — SIGNIFICANT CHANGE UP (ref 80–100)
PLATELET # BLD AUTO: 580 K/UL — HIGH (ref 150–400)
POTASSIUM SERPL-MCNC: 3.4 MMOL/L — LOW (ref 3.5–5.3)
POTASSIUM SERPL-SCNC: 3.4 MMOL/L — LOW (ref 3.5–5.3)
PROT SERPL-MCNC: 6.2 G/DL — SIGNIFICANT CHANGE UP (ref 6–8.3)
RBC # BLD: 2.8 M/UL — LOW (ref 3.8–5.2)
RBC # FLD: 14.5 % — SIGNIFICANT CHANGE UP (ref 10.3–14.5)
SODIUM SERPL-SCNC: 136 MMOL/L — SIGNIFICANT CHANGE UP (ref 135–145)
WBC # BLD: 17.24 K/UL — HIGH (ref 3.8–10.5)
WBC # FLD AUTO: 17.24 K/UL — HIGH (ref 3.8–10.5)

## 2019-10-26 RX ORDER — POTASSIUM CHLORIDE 20 MEQ
20 PACKET (EA) ORAL ONCE
Refills: 0 | Status: COMPLETED | OUTPATIENT
Start: 2019-10-26 | End: 2019-10-26

## 2019-10-26 RX ADMIN — Medication 20 MILLIGRAM(S): at 05:39

## 2019-10-26 RX ADMIN — SENNA PLUS 2 TABLET(S): 8.6 TABLET ORAL at 05:38

## 2019-10-26 RX ADMIN — Medication 100 MILLIGRAM(S): at 14:07

## 2019-10-26 RX ADMIN — PANTOPRAZOLE SODIUM 40 MILLIGRAM(S): 20 TABLET, DELAYED RELEASE ORAL at 05:39

## 2019-10-26 RX ADMIN — SENNA PLUS 2 TABLET(S): 8.6 TABLET ORAL at 22:04

## 2019-10-26 RX ADMIN — Medication 20 MILLIEQUIVALENT(S): at 18:23

## 2019-10-26 RX ADMIN — PANTOPRAZOLE SODIUM 40 MILLIGRAM(S): 20 TABLET, DELAYED RELEASE ORAL at 18:27

## 2019-10-26 RX ADMIN — POLYETHYLENE GLYCOL 3350 17 GRAM(S): 17 POWDER, FOR SOLUTION ORAL at 22:04

## 2019-10-26 RX ADMIN — Medication 1 MILLIGRAM(S): at 14:07

## 2019-10-26 RX ADMIN — Medication 25 MILLIGRAM(S): at 05:38

## 2019-10-26 RX ADMIN — AMLODIPINE BESYLATE 5 MILLIGRAM(S): 2.5 TABLET ORAL at 05:39

## 2019-10-26 RX ADMIN — CHLORHEXIDINE GLUCONATE 1 APPLICATION(S): 213 SOLUTION TOPICAL at 13:07

## 2019-10-26 NOTE — PROGRESS NOTE ADULT - ATTENDING COMMENTS
11. Hypokalemia follow supplement as needed  12. Leukocytosis repeat White blood cell count trending upward no obvious source or sign of infection.  Hold off on antibiotics at this time  13. Right middle lobe mucoid impaction no treatment needed at this time  14. Urinary retention schneider dcd follow PVR  15. HTN follow BP contin current meds    Gamal Martinez MD, FAAP  office [phone]  cell [phone]

## 2019-10-26 NOTE — PROGRESS NOTE ADULT - SUBJECTIVE AND OBJECTIVE BOX
Subjective: Still some generalized weakness, pain is better no bm yest. voiding ok    Exam:  Gen: NAD  Neck: no JVD  Chest: normal shape and expansion  Heart: RRR with 2/6 systolic murmur  Lungs: CTAB  Abdomen: Soft, Minimally distended and soft with mild diffuse abdominal tenderness no rebound tenderness, no guarding, no rigidity, no HSM, normoactive BS x 4  Ext: no CCE  Skin: Healing lesions on left torso from shingles        Vital Signs Last 24 Hrs  T(C): 36.8 (26 Oct 2019 05:34), Max: 36.8 (26 Oct 2019 05:34)  T(F): 98.2 (26 Oct 2019 05:34), Max: 98.2 (26 Oct 2019 05:34)  HR: 106 (26 Oct 2019 05:34) (91 - 106)  BP: 150/78 (26 Oct 2019 05:34) (122/72 - 151/74)  BP(mean): --  RR: 18 (26 Oct 2019 05:34) (18 - 18)  SpO2: 95% (26 Oct 2019 05:34) (95% - 99%)    Daily     Daily     I&O's Detail    25 Oct 2019 07:01  -  26 Oct 2019 07:00  --------------------------------------------------------  IN:    Oral Fluid: 220 mL  Total IN: 220 mL    OUT:    Indwelling Catheter - Urethral: 1800 mL  Total OUT: 1800 mL    Total NET: -1580 mL          Daily 38 (10-23 @ 09:39)    CAPILLARY BLOOD GLUCOSE      POCT Blood Glucose.: 243 mg/dL (25 Oct 2019 13:12)  POCT Blood Glucose.: 177 mg/dL (25 Oct 2019 09:00)      MEDICATIONS  (STANDING):  amLODIPine   Tablet 5 milliGRAM(s) Oral daily  chlorhexidine 2% Cloths 1 Application(s) Topical daily  docusate sodium 100 milliGRAM(s) Oral daily  folic acid 1 milliGRAM(s) Oral daily  furosemide    Tablet 20 milliGRAM(s) Oral daily  metoprolol succinate ER 25 milliGRAM(s) Oral daily  pantoprazole    Tablet 40 milliGRAM(s) Oral two times a day  polyethylene glycol 3350 17 Gram(s) Oral two times a day  senna 2 Tablet(s) Oral two times a day  sodium chloride 0.9%. 1000 milliLiter(s) (75 mL/Hr) IV Continuous <Continuous>    MEDICATIONS  (PRN):  acetaminophen    Suspension .. 650 milliGRAM(s) Oral every 4 hours PRN Temp greater or equal to 38C (100.4F), Moderate Pain (4 - 6)  bisacodyl Suppository 10 milliGRAM(s) Rectal daily PRN Constipation  ondansetron Injectable 4 milliGRAM(s) IV Push every 6 hours PRN Nausea and/or Vomiting  simethicone drops 80 milliGRAM(s) Oral two times a day PRN Gas      Labs:                          8.1    18.80 )-----------( 561      ( 24 Oct 2019 13:29 )             26.1       10-24    134<L>  |  94<L>  |  18  ----------------------------<  141<H>  3.9   |  27  |  0.41<L>    Ca    9.9      24 Oct 2019 09:11

## 2019-10-27 LAB
ANION GAP SERPL CALC-SCNC: 11 MMOL/L — SIGNIFICANT CHANGE UP (ref 5–17)
BLD GP AB SCN SERPL QL: NEGATIVE — SIGNIFICANT CHANGE UP
BUN SERPL-MCNC: 15 MG/DL — SIGNIFICANT CHANGE UP (ref 7–23)
CALCIUM SERPL-MCNC: 9.9 MG/DL — SIGNIFICANT CHANGE UP (ref 8.4–10.5)
CHLORIDE SERPL-SCNC: 94 MMOL/L — LOW (ref 96–108)
CO2 SERPL-SCNC: 28 MMOL/L — SIGNIFICANT CHANGE UP (ref 22–31)
CREAT SERPL-MCNC: 0.36 MG/DL — LOW (ref 0.5–1.3)
GLUCOSE BLDC GLUCOMTR-MCNC: 153 MG/DL — HIGH (ref 70–99)
GLUCOSE SERPL-MCNC: 149 MG/DL — HIGH (ref 70–99)
HCT VFR BLD CALC: 23 % — LOW (ref 34.5–45)
HGB BLD-MCNC: 7.3 G/DL — LOW (ref 11.5–15.5)
MCHC RBC-ENTMCNC: 27.4 PG — SIGNIFICANT CHANGE UP (ref 27–34)
MCHC RBC-ENTMCNC: 31.7 GM/DL — LOW (ref 32–36)
MCV RBC AUTO: 86.5 FL — SIGNIFICANT CHANGE UP (ref 80–100)
PLATELET # BLD AUTO: 604 K/UL — HIGH (ref 150–400)
POTASSIUM SERPL-MCNC: 3.3 MMOL/L — LOW (ref 3.5–5.3)
POTASSIUM SERPL-SCNC: 3.3 MMOL/L — LOW (ref 3.5–5.3)
RBC # BLD: 2.66 M/UL — LOW (ref 3.8–5.2)
RBC # FLD: 15 % — HIGH (ref 10.3–14.5)
RH IG SCN BLD-IMP: POSITIVE — SIGNIFICANT CHANGE UP
SODIUM SERPL-SCNC: 133 MMOL/L — LOW (ref 135–145)
WBC # BLD: 15.23 K/UL — HIGH (ref 3.8–10.5)
WBC # FLD AUTO: 15.23 K/UL — HIGH (ref 3.8–10.5)

## 2019-10-27 RX ORDER — POTASSIUM CHLORIDE 20 MEQ
10 PACKET (EA) ORAL
Refills: 0 | Status: COMPLETED | OUTPATIENT
Start: 2019-10-27 | End: 2019-10-27

## 2019-10-27 RX ORDER — POTASSIUM CHLORIDE 20 MEQ
40 PACKET (EA) ORAL ONCE
Refills: 0 | Status: COMPLETED | OUTPATIENT
Start: 2019-10-27 | End: 2019-10-27

## 2019-10-27 RX ORDER — POTASSIUM CHLORIDE 20 MEQ
40 PACKET (EA) ORAL EVERY 4 HOURS
Refills: 0 | Status: COMPLETED | OUTPATIENT
Start: 2019-10-27 | End: 2019-10-27

## 2019-10-27 RX ADMIN — SENNA PLUS 2 TABLET(S): 8.6 TABLET ORAL at 06:14

## 2019-10-27 RX ADMIN — Medication 100 MILLIEQUIVALENT(S): at 22:25

## 2019-10-27 RX ADMIN — AMLODIPINE BESYLATE 5 MILLIGRAM(S): 2.5 TABLET ORAL at 06:13

## 2019-10-27 RX ADMIN — Medication 40 MILLIEQUIVALENT(S): at 23:14

## 2019-10-27 RX ADMIN — Medication 650 MILLIGRAM(S): at 17:30

## 2019-10-27 RX ADMIN — Medication 20 MILLIGRAM(S): at 06:13

## 2019-10-27 RX ADMIN — SODIUM CHLORIDE 75 MILLILITER(S): 9 INJECTION INTRAMUSCULAR; INTRAVENOUS; SUBCUTANEOUS at 13:09

## 2019-10-27 RX ADMIN — CHLORHEXIDINE GLUCONATE 1 APPLICATION(S): 213 SOLUTION TOPICAL at 10:08

## 2019-10-27 RX ADMIN — POLYETHYLENE GLYCOL 3350 17 GRAM(S): 17 POWDER, FOR SOLUTION ORAL at 06:14

## 2019-10-27 RX ADMIN — Medication 650 MILLIGRAM(S): at 16:21

## 2019-10-27 RX ADMIN — Medication 25 MILLIGRAM(S): at 06:13

## 2019-10-27 NOTE — PROGRESS NOTE ADULT - SUBJECTIVE AND OBJECTIVE BOX
Subjective: Still some generalized weakness, pain is better bm today. voiding ok    Exam:  Gen: NAD  Neck: no JVD  Chest: normal shape and expansion  Heart: RRR with 2/6 systolic murmur  Lungs: CTAB  Abdomen: Soft, Minimally distended and soft with mild diffuse abdominal tenderness no rebound tenderness, no guarding, no rigidity, no HSM, normoactive BS x 4  Ext: no CCE  Skin: Healing lesions on left torso from shingles          Vital Signs Last 24 Hrs  T(C): 36.6 (27 Oct 2019 08:02), Max: 37.2 (27 Oct 2019 00:28)  T(F): 97.9 (27 Oct 2019 08:02), Max: 99 (27 Oct 2019 00:28)  HR: 97 (27 Oct 2019 08:02) (97 - 106)  BP: 132/74 (27 Oct 2019 08:02) (132/74 - 173/71)  BP(mean): --  RR: 18 (27 Oct 2019 08:02) (18 - 18)  SpO2: 95% (27 Oct 2019 08:02) (95% - 96%)    Daily     Daily     I&O's Detail    26 Oct 2019 07:01  -  27 Oct 2019 07:00  --------------------------------------------------------  IN:    Oral Fluid: 320 mL  Total IN: 320 mL    OUT:    Indwelling Catheter - Urethral: 1700 mL  Total OUT: 1700 mL    Total NET: -1380 mL          Daily     CAPILLARY BLOOD GLUCOSE      POCT Blood Glucose.: 153 mg/dL (27 Oct 2019 08:23)  POCT Blood Glucose.: 158 mg/dL (26 Oct 2019 19:38)      MEDICATIONS  (STANDING):  amLODIPine   Tablet 5 milliGRAM(s) Oral daily  chlorhexidine 2% Cloths 1 Application(s) Topical daily  docusate sodium 100 milliGRAM(s) Oral daily  folic acid 1 milliGRAM(s) Oral daily  furosemide    Tablet 20 milliGRAM(s) Oral daily  metoprolol succinate ER 25 milliGRAM(s) Oral daily  pantoprazole    Tablet 40 milliGRAM(s) Oral two times a day  polyethylene glycol 3350 17 Gram(s) Oral two times a day  senna 2 Tablet(s) Oral two times a day  sodium chloride 0.9%. 1000 milliLiter(s) (75 mL/Hr) IV Continuous <Continuous>    MEDICATIONS  (PRN):  acetaminophen    Suspension .. 650 milliGRAM(s) Oral every 4 hours PRN Temp greater or equal to 38C (100.4F), Moderate Pain (4 - 6)  bisacodyl Suppository 10 milliGRAM(s) Rectal daily PRN Constipation  ondansetron Injectable 4 milliGRAM(s) IV Push every 6 hours PRN Nausea and/or Vomiting  simethicone drops 80 milliGRAM(s) Oral two times a day PRN Gas      Labs:                          7.6    17.24 )-----------( 580      ( 26 Oct 2019 12:27 )             24.3   baso x      eos x      imm gran x      lymph x      mono x      poly x                            8.1    18.80 )-----------( 561      ( 24 Oct 2019 13:29 )             26.1   baso x      eos x      imm gran x      lymph x      mono x      poly x        10-27 @ 08:51    133<L>  |  94<L>  |  15  ----------------------------<  149<H>  3.3<L>   |  28  |  0.36<L>    10-26 @ 09:34    136  |  94<L>  |  14  ----------------------------<  153<H>  3.4<L>   |  28  |  0.39<L>    10-24 @ 09:11    134<L>  |  94<L>  |  18  ----------------------------<  141<H>  3.9   |  27  |  0.41<L>    10-23 @ 10:30    133<L>  |  91<L>  |  13  ----------------------------<  138<H>  3.7   |  29  |  0.42<L>      Ca    9.9      10-27 @ 08:51  Ca    10.0      10-26 @ 09:34  Ca    9.9      10-24 @ 09:11  Ca    9.4      10-23 @ 10:30    TPro  6.2  /  Alb  2.3<L>  /  TBili  0.3  /  DBili  x   /  AST  20  /  ALT  56<H>  /  AlkPhos  207<H>  10-26 @ 09:34

## 2019-10-27 NOTE — PROGRESS NOTE ADULT - ATTENDING COMMENTS
11. Hypokalemia follow supplement as needed  12. Leukocytosis repeat White blood cell count trending upward no obvious source or sign of infection.  Hold off on antibiotics at this time  13. Right middle lobe mucoid impaction no treatment needed at this time  14. Urinary retention schneider dcd follow PVR  15. HTN follow BP contin current meds    Gamal Martinez MD, FAAP  office [phone]  cell [phone]

## 2019-10-28 LAB
ANION GAP SERPL CALC-SCNC: 11 MMOL/L — SIGNIFICANT CHANGE UP (ref 5–17)
BUN SERPL-MCNC: 14 MG/DL — SIGNIFICANT CHANGE UP (ref 7–23)
CALCIUM SERPL-MCNC: 9.5 MG/DL — SIGNIFICANT CHANGE UP (ref 8.4–10.5)
CHLORIDE SERPL-SCNC: 101 MMOL/L — SIGNIFICANT CHANGE UP (ref 96–108)
CO2 SERPL-SCNC: 26 MMOL/L — SIGNIFICANT CHANGE UP (ref 22–31)
CREAT SERPL-MCNC: 0.32 MG/DL — LOW (ref 0.5–1.3)
GLUCOSE BLDC GLUCOMTR-MCNC: 141 MG/DL — HIGH (ref 70–99)
GLUCOSE BLDC GLUCOMTR-MCNC: 169 MG/DL — HIGH (ref 70–99)
GLUCOSE BLDC GLUCOMTR-MCNC: 215 MG/DL — HIGH (ref 70–99)
GLUCOSE SERPL-MCNC: 145 MG/DL — HIGH (ref 70–99)
HCT VFR BLD CALC: 33.9 % — LOW (ref 34.5–45)
HGB BLD-MCNC: 10.8 G/DL — LOW (ref 11.5–15.5)
MCHC RBC-ENTMCNC: 28.1 PG — SIGNIFICANT CHANGE UP (ref 27–34)
MCHC RBC-ENTMCNC: 31.9 GM/DL — LOW (ref 32–36)
MCV RBC AUTO: 88.3 FL — SIGNIFICANT CHANGE UP (ref 80–100)
PLATELET # BLD AUTO: 557 K/UL — HIGH (ref 150–400)
POTASSIUM SERPL-MCNC: 3.9 MMOL/L — SIGNIFICANT CHANGE UP (ref 3.5–5.3)
POTASSIUM SERPL-SCNC: 3.9 MMOL/L — SIGNIFICANT CHANGE UP (ref 3.5–5.3)
RBC # BLD: 3.84 M/UL — SIGNIFICANT CHANGE UP (ref 3.8–5.2)
RBC # FLD: 14.7 % — HIGH (ref 10.3–14.5)
SODIUM SERPL-SCNC: 138 MMOL/L — SIGNIFICANT CHANGE UP (ref 135–145)
WBC # BLD: 15.47 K/UL — HIGH (ref 3.8–10.5)
WBC # FLD AUTO: 15.47 K/UL — HIGH (ref 3.8–10.5)

## 2019-10-28 PROCEDURE — 99233 SBSQ HOSP IP/OBS HIGH 50: CPT

## 2019-10-28 RX ADMIN — Medication 650 MILLIGRAM(S): at 05:43

## 2019-10-28 RX ADMIN — PANTOPRAZOLE SODIUM 40 MILLIGRAM(S): 20 TABLET, DELAYED RELEASE ORAL at 17:22

## 2019-10-28 RX ADMIN — Medication 100 MILLIGRAM(S): at 12:19

## 2019-10-28 RX ADMIN — Medication 650 MILLIGRAM(S): at 19:15

## 2019-10-28 RX ADMIN — Medication 650 MILLIGRAM(S): at 00:00

## 2019-10-28 RX ADMIN — POLYETHYLENE GLYCOL 3350 17 GRAM(S): 17 POWDER, FOR SOLUTION ORAL at 17:22

## 2019-10-28 RX ADMIN — Medication 650 MILLIGRAM(S): at 06:44

## 2019-10-28 RX ADMIN — Medication 650 MILLIGRAM(S): at 10:23

## 2019-10-28 RX ADMIN — SODIUM CHLORIDE 75 MILLILITER(S): 9 INJECTION INTRAMUSCULAR; INTRAVENOUS; SUBCUTANEOUS at 19:30

## 2019-10-28 RX ADMIN — Medication 1 MILLIGRAM(S): at 12:19

## 2019-10-28 RX ADMIN — Medication 650 MILLIGRAM(S): at 22:18

## 2019-10-28 RX ADMIN — SENNA PLUS 2 TABLET(S): 8.6 TABLET ORAL at 05:48

## 2019-10-28 RX ADMIN — AMLODIPINE BESYLATE 5 MILLIGRAM(S): 2.5 TABLET ORAL at 05:48

## 2019-10-28 RX ADMIN — Medication 650 MILLIGRAM(S): at 10:40

## 2019-10-28 RX ADMIN — Medication 650 MILLIGRAM(S): at 23:18

## 2019-10-28 RX ADMIN — Medication 650 MILLIGRAM(S): at 14:23

## 2019-10-28 RX ADMIN — Medication 650 MILLIGRAM(S): at 18:46

## 2019-10-28 RX ADMIN — CHLORHEXIDINE GLUCONATE 1 APPLICATION(S): 213 SOLUTION TOPICAL at 12:19

## 2019-10-28 RX ADMIN — Medication 650 MILLIGRAM(S): at 00:30

## 2019-10-28 RX ADMIN — SODIUM CHLORIDE 75 MILLILITER(S): 9 INJECTION INTRAMUSCULAR; INTRAVENOUS; SUBCUTANEOUS at 05:48

## 2019-10-28 RX ADMIN — Medication 650 MILLIGRAM(S): at 15:00

## 2019-10-28 RX ADMIN — Medication 25 MILLIGRAM(S): at 05:48

## 2019-10-28 RX ADMIN — PANTOPRAZOLE SODIUM 40 MILLIGRAM(S): 20 TABLET, DELAYED RELEASE ORAL at 05:47

## 2019-10-28 RX ADMIN — Medication 20 MILLIGRAM(S): at 05:47

## 2019-10-28 RX ADMIN — SENNA PLUS 2 TABLET(S): 8.6 TABLET ORAL at 17:23

## 2019-10-28 NOTE — PROGRESS NOTE ADULT - ATTENDING COMMENTS
11. Hypokalemia follow supplement as needed  12. Leukocytosis no obvious source or sign of infection.  Hold off on antibiotics at this time  13. Right middle lobe mucoid impaction no treatment needed at this time  14. Urinary retention schneider dcd follow PVR  15. HTN follow BP contin current meds    Gamal Martinez MD, FAAP  office [phone]  cell [phone]

## 2019-10-28 NOTE — PROGRESS NOTE ADULT - ASSESSMENT
89 yr old female with hx breast cancer. She had recent endoscopy 9/18/19 for anemia noted for hiatal hernia, gastritis with superficial ulcerations and erosions in the stomach and duodenum. She was previously taking NSAIDs for discomfort associated with Herpes Zoster . On the upper endoscopy she was found to be H. pylori +, no treatment per family request. Hgb and Hct essentially stable now with no report of active GI blood loss . She continues on a PPI     Patient had a CT dated 10/14/19  to further evaluate mild abdominal distention ( unchanged) per report no acute GI process reported, though the CT did reported lytic osseous lesions suspicious for metastatic disease.  Patient had bone scan that reported metastatic disease to the mid and lower thoracic spine, left lower  rib / pelvis and prox right femur   Heme-Onc following       Recommendations:    Continue PPI BID   bowel regimen as ordered              PO as tolerated, assist with meals   GOC, discharge planning per primary team    Fernando Salomon PA-C    [facility]  [phone]  After hours and weekend coverage [phone]

## 2019-10-28 NOTE — PROGRESS NOTE ADULT - SUBJECTIVE AND OBJECTIVE BOX
Chief Complaint: fu    History of Present Illness: currently resting/sleeping- briefly opens eyes; per RN- ate small amount earlier, had generalized pain and given tylenol, no overt bleeding; ROS limited      MEDICATIONS  (STANDING):  amLODIPine   Tablet 5 milliGRAM(s) Oral daily  chlorhexidine 2% Cloths 1 Application(s) Topical daily  docusate sodium 100 milliGRAM(s) Oral daily  folic acid 1 milliGRAM(s) Oral daily  furosemide    Tablet 20 milliGRAM(s) Oral daily  metoprolol succinate ER 25 milliGRAM(s) Oral daily  pantoprazole    Tablet 40 milliGRAM(s) Oral two times a day  polyethylene glycol 3350 17 Gram(s) Oral two times a day  senna 2 Tablet(s) Oral two times a day  sodium chloride 0.9%. 1000 milliLiter(s) (75 mL/Hr) IV Continuous <Continuous>    MEDICATIONS  (PRN):  acetaminophen    Suspension .. 650 milliGRAM(s) Oral every 4 hours PRN Temp greater or equal to 38C (100.4F), Moderate Pain (4 - 6)  bisacodyl Suppository 10 milliGRAM(s) Rectal daily PRN Constipation  ondansetron Injectable 4 milliGRAM(s) IV Push every 6 hours PRN Nausea and/or Vomiting  simethicone drops 80 milliGRAM(s) Oral two times a day PRN Gas      Allergies    penicillin (Unknown)    Intolerances        Vital Signs Last 24 Hrs  T(C): 36.5 (28 Oct 2019 08:29), Max: 36.7 (27 Oct 2019 17:56)  T(F): 97.7 (28 Oct 2019 08:29), Max: 98 (27 Oct 2019 17:56)  HR: 99 (28 Oct 2019 08:29) (94 - 101)  BP: 165/68 (28 Oct 2019 08:29) (132/78 - 165/68)  BP(mean): --  RR: 18 (28 Oct 2019 08:29) (18 - 18)  SpO2: 99% (28 Oct 2019 08:29) (94% - 99%)    PHYSICAL EXAM  General: adult in NAD  Neck: supple  CV: normal S1/S2  Lungs: decreased BS, poor effort  Abdomen: soft non-tender non-distended, positive bowel sounds  Ext: no clubbing cyanosis or edema  Skin: no rashes and no petechiae      LABS:                          10.8   15.47 )-----------( 557      ( 28 Oct 2019 11:05 )             33.9         Mean Cell Volume : 88.3 fl  Mean Cell Hemoglobin : 28.1 pg  Mean Cell Hemoglobin Concentration : 31.9 gm/dL  Auto Neutrophil # : x  Auto Lymphocyte # : x  Auto Monocyte # : x  Auto Eosinophil # : x  Auto Basophil # : x  Auto Neutrophil % : x  Auto Lymphocyte % : x  Auto Monocyte % : x  Auto Eosinophil % : x  Auto Basophil % : x      Serial CBC's  10-28 @ 11:05  Hct-33.9 / Hgb-10.8 / Plat-557 / RBC-3.84 / WBC-15.47  Serial CBC's  10-27 @ 12:01  Hct-23.0 / Hgb-7.3 / Plat-604 / RBC-2.66 / WBC-15.23  Serial CBC's  10-26 @ 12:27  Hct-24.3 / Hgb-7.6 / Plat-580 / RBC-2.80 / WBC-17.24      10-28    138  |  101  |  14  ----------------------------<  145<H>  3.9   |  26  |  0.32<L>    Ca    9.5      28 Oct 2019 08:56                        Radiology:

## 2019-10-28 NOTE — PROGRESS NOTE ADULT - SUBJECTIVE AND OBJECTIVE BOX
Subjective: Has been getting progressively weaker.  Patient denies any pain at rest but has some discomfort when moving eating and drinking very little.  Not much appetite    Exam:  Gen: NAD  Neck: no JVD  Chest: normal shape and expansion  Heart: RRR with 2/6 systolic murmur  Lungs: CTAB  Abdomen: Soft, Minimally distended and soft with mild diffuse abdominal tenderness no rebound tenderness, no guarding, no rigidity, no HSM, normoactive BS x 4  Ext: no CCE  Skin: Healing lesions on left torso from shingles        Vital Signs Last 24 Hrs  T(C): 36.3 (28 Oct 2019 17:15), Max: 36.5 (27 Oct 2019 22:46)  T(F): 97.3 (28 Oct 2019 17:15), Max: 97.7 (27 Oct 2019 22:46)  HR: 94 (28 Oct 2019 17:15) (94 - 101)  BP: 157/67 (28 Oct 2019 17:15) (153/71 - 165/68)  BP(mean): --  RR: 18 (28 Oct 2019 17:15) (18 - 18)  SpO2: 97% (28 Oct 2019 17:15) (94% - 99%)    Daily     Daily     I&O's Detail    27 Oct 2019 07:01  -  28 Oct 2019 07:00  --------------------------------------------------------  IN:    Oral Fluid: 340 mL  Total IN: 340 mL    OUT:    Indwelling Catheter - Urethral: 1400 mL    Voided: 1000 mL  Total OUT: 2400 mL    Total NET: -2060 mL      28 Oct 2019 07:01  -  28 Oct 2019 18:23  --------------------------------------------------------  IN:  Total IN: 0 mL    OUT:    Voided: 600 mL  Total OUT: 600 mL    Total NET: -600 mL          Daily     CAPILLARY BLOOD GLUCOSE      POCT Blood Glucose.: 215 mg/dL (28 Oct 2019 17:11)  POCT Blood Glucose.: 169 mg/dL (28 Oct 2019 12:56)  POCT Blood Glucose.: 141 mg/dL (28 Oct 2019 08:19)      MEDICATIONS  (STANDING):  amLODIPine   Tablet 5 milliGRAM(s) Oral daily  chlorhexidine 2% Cloths 1 Application(s) Topical daily  docusate sodium 100 milliGRAM(s) Oral daily  folic acid 1 milliGRAM(s) Oral daily  furosemide    Tablet 20 milliGRAM(s) Oral daily  metoprolol succinate ER 25 milliGRAM(s) Oral daily  pantoprazole    Tablet 40 milliGRAM(s) Oral two times a day  polyethylene glycol 3350 17 Gram(s) Oral two times a day  senna 2 Tablet(s) Oral two times a day  sodium chloride 0.9%. 1000 milliLiter(s) (75 mL/Hr) IV Continuous <Continuous>    MEDICATIONS  (PRN):  acetaminophen    Suspension .. 650 milliGRAM(s) Oral every 4 hours PRN Temp greater or equal to 38C (100.4F), Moderate Pain (4 - 6)  bisacodyl Suppository 10 milliGRAM(s) Rectal daily PRN Constipation  ondansetron Injectable 4 milliGRAM(s) IV Push every 6 hours PRN Nausea and/or Vomiting  simethicone drops 80 milliGRAM(s) Oral two times a day PRN Gas      Labs:                                  10.8   15.47 )-----------( 557      ( 28 Oct 2019 11:05 )             33.9   baso x      eos x      imm gran x      lymph x      mono x      poly x                            7.3    15.23 )-----------( 604      ( 27 Oct 2019 12:01 )             23.0   baso x      eos x      imm gran x      lymph x      mono x      poly x                            7.6    17.24 )-----------( 580      ( 26 Oct 2019 12:27 )             24.3   baso x      eos x      imm gran x      lymph x      mono x      poly x      10-28 @ 08:56    138  |  101  |  14  ----------------------------<  145<H>  3.9   |  26  |  0.32<L>    10-27 @ 08:51    133<L>  |  94<L>  |  15  ----------------------------<  149<H>  3.3<L>   |  28  |  0.36<L>    10-26 @ 09:34    136  |  94<L>  |  14  ----------------------------<  153<H>  3.4<L>   |  28  |  0.39<L>    10-24 @ 09:11    134<L>  |  94<L>  |  18  ----------------------------<  141<H>  3.9   |  27  |  0.41<L>      Ca    9.5      10-28 @ 08:56  Ca    9.9      10-27 @ 08:51  Ca    10.0      10-26 @ 09:34  Ca    9.9      10-24 @ 09:11    TPro  6.2  /  Alb  2.3<L>  /  TBili  0.3  /  DBili  x   /  AST  20  /  ALT  56<H>  /  AlkPhos  207<H>  10-26 @ 09:34

## 2019-10-28 NOTE — PROGRESS NOTE ADULT - SUBJECTIVE AND OBJECTIVE BOX
Patient is a 89y old  Female who presented with a chief complaint of Weakness and anemia (27 Oct 2019 10:50)      INTERVAL HPI/OVERNIGHT EVENTS:  no acute GI events noted  +BM yesterday    MEDICATIONS  (STANDING):  amLODIPine   Tablet 5 milliGRAM(s) Oral daily  chlorhexidine 2% Cloths 1 Application(s) Topical daily  docusate sodium 100 milliGRAM(s) Oral daily  folic acid 1 milliGRAM(s) Oral daily  furosemide    Tablet 20 milliGRAM(s) Oral daily  metoprolol succinate ER 25 milliGRAM(s) Oral daily  pantoprazole    Tablet 40 milliGRAM(s) Oral two times a day  polyethylene glycol 3350 17 Gram(s) Oral two times a day  senna 2 Tablet(s) Oral two times a day  sodium chloride 0.9%. 1000 milliLiter(s) (75 mL/Hr) IV Continuous <Continuous>    MEDICATIONS  (PRN):  acetaminophen    Suspension .. 650 milliGRAM(s) Oral every 4 hours PRN Temp greater or equal to 38C (100.4F), Moderate Pain (4 - 6)  bisacodyl Suppository 10 milliGRAM(s) Rectal daily PRN Constipation  ondansetron Injectable 4 milliGRAM(s) IV Push every 6 hours PRN Nausea and/or Vomiting  simethicone drops 80 milliGRAM(s) Oral two times a day PRN Gas      Allergies  penicillin (Unknown)      Review of Systems:  General:  No fevers or chills    ENT:  denies any nausea or vomiting , denies  dysphagia  CV:  No chest pain  Resp:  No cough or  wheezing  GI: see HPI    Vital Signs Last 24 Hrs  T(C): 36.5 (28 Oct 2019 08:29), Max: 36.7 (27 Oct 2019 17:56)  T(F): 97.7 (28 Oct 2019 08:29), Max: 98 (27 Oct 2019 17:56)  HR: 99 (28 Oct 2019 08:29) (94 - 101)  BP: 165/68 (28 Oct 2019 08:29) (132/78 - 165/68)  BP(mean): --  RR: 18 (28 Oct 2019 08:29) (18 - 18)  SpO2: 99% (28 Oct 2019 08:29) (94% - 99%)    PHYSICAL EXAM:  Constitutional: nontoxic in NAD, frail elderly WF appears comfortable  Neck:  supple  Respiratory: anterior chest wall clear to auscultation  Cardiovascular: S1 and S2 +murmur  Gastrointestinal: soft , +BS no rebound, guarding or rigidity  Extremities: No peripheral edema, or cyanosis  Neurological: A/O x 2  Skin: No visible  rashes    LABS:                        10.8   15.47 )-----------( 557      ( 28 Oct 2019 11:05 )             33.9     10-28    138  |  101  |  14  ----------------------------<  145<H>  3.9   |  26  |  0.32<L>    Ca    9.5      28 Oct 2019 08:56            RADIOLOGY & ADDITIONAL TESTS:

## 2019-10-28 NOTE — PROGRESS NOTE ADULT - ASSESSMENT
89F w/ hx of DM2, Breast cancer, gastric ulcer, afib not on AC due to falls risk, HTN, HLD p/w worsening weakness and anemia, treated for UTI, found to have lytic lesions on CT abdomen    #lytic lesions on CT- with history of Breast cancer Stage 1c mucinous, [id number] - s/p lumpectomy- patient had refused any adjuvant hormonal treatment; recently CA 27-29 with slight trend up though still normal; mammogram/US 10/30/18- negative  - likely metastatic breast cancer  - CT chest without lung lesions; MRI brain no mets; bone scan with multiple mets- mid/lower thoracic spine, left lower rib, pelvis, proximal right femur without cortical disruption- s/p xrays femur/hips- Ortho input noted, WBAT  - MRI spine no cord compression  - CT abd also with indeterminate liver lesions  - SPEP/IF - IF IgG lambda band; K/L ratio is normal, M spike with 0.2 g/dl gamma migrating paraprotein- VERY small M spike, likely incidental finding  - pt with overall general decline/very poor functional status; pain is in general well controlled with tylenol; would hold on biopsy or further eval, would be appropriate for Hospice, d/w Dr. Martinez    #Anemia, normocytic- likely multifactorial- AOCD + GI loss with guaiac positive stool  - recent EGD with gastritis/duodenal ulcer, guaiac + this admission- on PPI  - s/p 1 unit PRBC 10/11  - iron studies AOCD; B12 level normal; Folate slightly low- on replacement  - s/p 1 unit PRBC 10/27- Hg improved    #thrombocytosis- likely reactive in setting of acute illness/ UTI/ recent GIB  - platelet count had been normal 2018, monitor    #hypercalcemia- likely secondary to malignancy +/- volume depletion  - now off HCTZ  - on hydration  - PTH low, PTHrP low  - Ca improved/stable    d/w NP  d/w Dr. Martinez today

## 2019-10-29 DIAGNOSIS — R52 PAIN, UNSPECIFIED: ICD-10-CM

## 2019-10-29 DIAGNOSIS — C50.919 MALIGNANT NEOPLASM OF UNSPECIFIED SITE OF UNSPECIFIED FEMALE BREAST: ICD-10-CM

## 2019-10-29 DIAGNOSIS — Z51.5 ENCOUNTER FOR PALLIATIVE CARE: ICD-10-CM

## 2019-10-29 DIAGNOSIS — R53.81 OTHER MALAISE: ICD-10-CM

## 2019-10-29 LAB
ANION GAP SERPL CALC-SCNC: 12 MMOL/L — SIGNIFICANT CHANGE UP (ref 5–17)
BUN SERPL-MCNC: 15 MG/DL — SIGNIFICANT CHANGE UP (ref 7–23)
CALCIUM SERPL-MCNC: 10.4 MG/DL — SIGNIFICANT CHANGE UP (ref 8.4–10.5)
CHLORIDE SERPL-SCNC: 99 MMOL/L — SIGNIFICANT CHANGE UP (ref 96–108)
CO2 SERPL-SCNC: 25 MMOL/L — SIGNIFICANT CHANGE UP (ref 22–31)
CREAT SERPL-MCNC: 0.31 MG/DL — LOW (ref 0.5–1.3)
GLUCOSE BLDC GLUCOMTR-MCNC: 134 MG/DL — HIGH (ref 70–99)
GLUCOSE BLDC GLUCOMTR-MCNC: 142 MG/DL — HIGH (ref 70–99)
GLUCOSE SERPL-MCNC: 142 MG/DL — HIGH (ref 70–99)
HCT VFR BLD CALC: 30.9 % — LOW (ref 34.5–45)
HGB BLD-MCNC: 9.6 G/DL — LOW (ref 11.5–15.5)
MCHC RBC-ENTMCNC: 27.6 PG — SIGNIFICANT CHANGE UP (ref 27–34)
MCHC RBC-ENTMCNC: 31.1 GM/DL — LOW (ref 32–36)
MCV RBC AUTO: 88.8 FL — SIGNIFICANT CHANGE UP (ref 80–100)
PLATELET # BLD AUTO: 582 K/UL — HIGH (ref 150–400)
POTASSIUM SERPL-MCNC: 4 MMOL/L — SIGNIFICANT CHANGE UP (ref 3.5–5.3)
POTASSIUM SERPL-SCNC: 4 MMOL/L — SIGNIFICANT CHANGE UP (ref 3.5–5.3)
RBC # BLD: 3.48 M/UL — LOW (ref 3.8–5.2)
RBC # FLD: 14.5 % — SIGNIFICANT CHANGE UP (ref 10.3–14.5)
SODIUM SERPL-SCNC: 136 MMOL/L — SIGNIFICANT CHANGE UP (ref 135–145)
WBC # BLD: 17.73 K/UL — HIGH (ref 3.8–10.5)
WBC # FLD AUTO: 17.73 K/UL — HIGH (ref 3.8–10.5)

## 2019-10-29 PROCEDURE — 99223 1ST HOSP IP/OBS HIGH 75: CPT

## 2019-10-29 RX ORDER — MORPHINE SULFATE 50 MG/1
5 CAPSULE, EXTENDED RELEASE ORAL EVERY 4 HOURS
Refills: 0 | Status: DISCONTINUED | OUTPATIENT
Start: 2019-10-29 | End: 2019-10-29

## 2019-10-29 RX ORDER — ACETAMINOPHEN 500 MG
650 TABLET ORAL EVERY 6 HOURS
Refills: 0 | Status: DISCONTINUED | OUTPATIENT
Start: 2019-10-29 | End: 2019-11-04

## 2019-10-29 RX ORDER — ACETAMINOPHEN 500 MG
650 TABLET ORAL ONCE
Refills: 0 | Status: DISCONTINUED | OUTPATIENT
Start: 2019-10-29 | End: 2019-10-29

## 2019-10-29 RX ORDER — ACETAMINOPHEN 500 MG
650 TABLET ORAL ONCE
Refills: 0 | Status: COMPLETED | OUTPATIENT
Start: 2019-10-29 | End: 2019-10-29

## 2019-10-29 RX ORDER — MORPHINE SULFATE 50 MG/1
2.5 CAPSULE, EXTENDED RELEASE ORAL EVERY 4 HOURS
Refills: 0 | Status: DISCONTINUED | OUTPATIENT
Start: 2019-10-29 | End: 2019-11-04

## 2019-10-29 RX ADMIN — Medication 100 MILLIGRAM(S): at 11:53

## 2019-10-29 RX ADMIN — Medication 650 MILLIGRAM(S): at 02:19

## 2019-10-29 RX ADMIN — PANTOPRAZOLE SODIUM 40 MILLIGRAM(S): 20 TABLET, DELAYED RELEASE ORAL at 17:47

## 2019-10-29 RX ADMIN — Medication 650 MILLIGRAM(S): at 05:11

## 2019-10-29 RX ADMIN — Medication 1 MILLIGRAM(S): at 11:53

## 2019-10-29 RX ADMIN — Medication 650 MILLIGRAM(S): at 17:52

## 2019-10-29 RX ADMIN — Medication 650 MILLIGRAM(S): at 18:30

## 2019-10-29 RX ADMIN — PANTOPRAZOLE SODIUM 40 MILLIGRAM(S): 20 TABLET, DELAYED RELEASE ORAL at 05:15

## 2019-10-29 RX ADMIN — SENNA PLUS 2 TABLET(S): 8.6 TABLET ORAL at 05:15

## 2019-10-29 RX ADMIN — Medication 650 MILLIGRAM(S): at 06:11

## 2019-10-29 RX ADMIN — CHLORHEXIDINE GLUCONATE 1 APPLICATION(S): 213 SOLUTION TOPICAL at 12:00

## 2019-10-29 RX ADMIN — Medication 650 MILLIGRAM(S): at 09:14

## 2019-10-29 RX ADMIN — AMLODIPINE BESYLATE 5 MILLIGRAM(S): 2.5 TABLET ORAL at 05:15

## 2019-10-29 RX ADMIN — Medication 10 MILLIGRAM(S): at 09:56

## 2019-10-29 RX ADMIN — POLYETHYLENE GLYCOL 3350 17 GRAM(S): 17 POWDER, FOR SOLUTION ORAL at 17:47

## 2019-10-29 RX ADMIN — Medication 650 MILLIGRAM(S): at 23:44

## 2019-10-29 RX ADMIN — Medication 260 MILLIGRAM(S): at 01:48

## 2019-10-29 RX ADMIN — Medication 25 MILLIGRAM(S): at 05:15

## 2019-10-29 RX ADMIN — Medication 20 MILLIGRAM(S): at 05:15

## 2019-10-29 RX ADMIN — Medication 650 MILLIGRAM(S): at 13:37

## 2019-10-29 RX ADMIN — Medication 650 MILLIGRAM(S): at 14:15

## 2019-10-29 RX ADMIN — Medication 650 MILLIGRAM(S): at 09:45

## 2019-10-29 RX ADMIN — SENNA PLUS 2 TABLET(S): 8.6 TABLET ORAL at 17:48

## 2019-10-29 NOTE — PROGRESS NOTE ADULT - ASSESSMENT
89F w/ hx of DM2, Breast cancer, gastric ulcer, afib not on AC due to falls risk, HTN, HLD p/w worsening weakness and anemia, treated for UTI, found to have lytic lesions on CT abdomen    #lytic lesions on CT- with history of Breast cancer Stage 1c mucinous, [id number] - s/p lumpectomy- patient had refused any adjuvant hormonal treatment; recently CA 27-29 with slight trend up though still normal; mammogram/US 10/30/18- negative  - likely metastatic breast cancer  - CT chest without lung lesions; MRI brain no mets; bone scan with multiple mets- mid/lower thoracic spine, left lower rib, pelvis, proximal right femur without cortical disruption- s/p xrays femur/hips- Ortho input noted, WBAT  - MRI spine no cord compression  - CT abd also with indeterminate liver lesions  - SPEP/IF - IF IgG lambda band; K/L ratio is normal, M spike with 0.2 g/dl gamma migrating paraprotein- VERY small M spike, likely incidental finding  - no plan for bone biopsy; no plan for empiric AI treatment as now comfort care    #Anemia, normocytic- likely multifactorial- AOCD + GI loss with guaiac positive stool  - recent EGD with gastritis/duodenal ulcer, guaiac + this admission- on PPI  - s/p 1 unit PRBC 10/11  - iron studies AOCD; B12 level normal; Folate slightly low- on replacement  - s/p 1 unit PRBC 10/27- Hg improved    #hypercalcemia- likely secondary to malignancy +/- volume depletion  - now off HCTZ  - on hydration  - PTH low, PTHrP low  - Ca fluctuating    Pt with overall general decline/very poor functional status; no plan for bone biopsy; although empiric treatment with an AI was considered, goals of care are comfort and comfort measures to be instituted. Palliative Care consult pending.  d/w Dr. Martinez today who has discussed in detail with patient's HCP, Dr. Primitivo Zendejas. Patient's son who has no decision making capability is currently at bedside with his children with multiple questions regarding care.

## 2019-10-29 NOTE — CONSULT NOTE ADULT - ASSESSMENT
89F w/ hx of DM2, Breast cancer, gastric ulcer, afib not on AC due to falls risk, HTN, HLD p/w worsening weakness and anemia. CT scan shows metastatic disease with lytic lesions. Palliative care consulted to assist with goals of care

## 2019-10-29 NOTE — CONSULT NOTE ADULT - SUBJECTIVE AND OBJECTIVE BOX
HPI:  89F w/ hx of DM2, Breast cancer, gastric ulcer, afib not on AC due to falls risk, HTN, HLD p/w worsening weakness and anemia. Pt was recently admitted for GIB roughly 3 weeks ago and upon endoscopy was found to have gastritis and duodenal ulcer. This was in setting of increased NSAID use to control pain from shingles exacerbation. Pt received transfusions and was discharged to follow up with GI regarding biopsy results. Son and pt informed of H. pyolri in biopsy. After lengthy family discussion, decision was made not to treat. Son noticed his mother getting weaker over past 1-2 weeks. Asked PMD to perform repeat blood test. Pt was found to have hgb of 7 and sent to ER for further evaluation. Pt denies any chest pain, SOB, palpitations. Endorses black mushy stool after starting iron in the hospital. Endorses waking up at night with sweats for many days. Denies any additional NSAID use.    In ER: Given acetaminophen (12 Oct 2019 02:58)    Palliative care consulted for assistance with GOC. Met with patient and family at bedside. introduced self and role, hospice liason accompanied to bedside. Discussed pain management strategies including scheduled tylenol and prn oral morphine. Options for hospice care discussed at length with son Gerardo and again via phone with HCP Primitivo. Case discussed with Dr. Martinez. Family considering home with hospice and hiring additional aide services. Discussed with family about prognosis and life expectancy, expectation is likely weeks given poor PO intake and failure to thrive. emotional support provided.     PERTINENT PM/SXH:   Unsteady gait  Osteopenia  Injury of head, subsequent encounter  Fall, subsequent encounter  Malignant neoplasm of left breast in female, estrogen receptor positive, unspecified site of breast  Polymyalgia rheumatica  CAD (coronary artery disease)  Bleeding Duodenal Ulcer  Age-Related Hearing Loss  Hypercholesteremia  HTN (Hypertension)  Atrial Fibrillation    Closed fracture of patella  S/P colonoscopy  S/P breast biopsy, left  Atherosclerotic Coronary Vascular Disease  Stented Coronary Artery    FAMILY HISTORY:  FH: breast cancer    ITEMS NOT CHECKED ARE NOT PRESENT    SOCIAL HISTORY:   Significant other/partner:  [ ]  Children:  [ x]  Congregational/Spirituality:  Substance hx:  [ ]   Tobacco hx:  [ ]   Alcohol hx: [ ]   Home Opioid hx:  [ ] I-Stop Reference No:  Living Situation: [ x]Home  [ ]Long term care  [ ]Rehab [ ]Other    ADVANCE DIRECTIVES:    DNR  Yes  MOLST  [x ]  Living Will  [ ]   DECISION MAKER(s):  [x ] Health Care Proxy(s)  [ ] Surrogate(s)  [ ] Guardian           Name(s): Phone Number(s):  Barbara [phone]    BASELINE (I)ADL(s) (prior to admission):  [place]: [ ]Total  [ x] Moderate [ ]Dependent    Allergies    penicillin (Unknown)    Intolerances    MEDICATIONS  (STANDING):  acetaminophen   Tablet .. 650 milliGRAM(s) Oral every 6 hours  amLODIPine   Tablet 5 milliGRAM(s) Oral daily  chlorhexidine 2% Cloths 1 Application(s) Topical daily  docusate sodium 100 milliGRAM(s) Oral daily  folic acid 1 milliGRAM(s) Oral daily  furosemide    Tablet 20 milliGRAM(s) Oral daily  metoprolol succinate ER 25 milliGRAM(s) Oral daily  pantoprazole    Tablet 40 milliGRAM(s) Oral two times a day  polyethylene glycol 3350 17 Gram(s) Oral two times a day  senna 2 Tablet(s) Oral two times a day    MEDICATIONS  (PRN):  acetaminophen    Suspension .. 650 milliGRAM(s) Oral every 4 hours PRN Temp greater or equal to 38C (100.4F), Moderate Pain (4 - 6)  bisacodyl Suppository 10 milliGRAM(s) Rectal daily PRN Constipation  morphine   Solution 2.5 milliGRAM(s) Oral every 4 hours PRN moderate or severe pain  ondansetron Injectable 4 milliGRAM(s) IV Push every 6 hours PRN Nausea and/or Vomiting  simethicone drops 80 milliGRAM(s) Oral two times a day PRN Gas    PRESENT SYMPTOMS: [ ]Unable to obtain due to poor mentation   Source if other than patient:  [ ]Family   [ ]Team     Pain (Impact on QOL):  yes  Location -    ribs, legs, bone pain     Minimal acceptable level (0-10 scale): 5                   Aggrevating factors - movement  Quality - sharp  Radiation - localized  Severity (0-10 scale) -  8  Timing - comes/goes    PAIN AD Score:     [email or web address]/[email or web address] (press ctrl +  left click to view)    Dyspnea:                           [ ]Mild [ ]Moderate [ ]Severe  Anxiety:                             [ ]Mild [ ]Moderate [ ]Severe  Fatigue:                             [ ]Mild [x ]Moderate [ ]Severe  Nausea:                             [ ]Mild [ ]Moderate [ ]Severe  Loss of appetite:              [ ]Mild [x ]Moderate [ ]Severe  Constipation:                    [ ]Mild [ ]Moderate [ ]Severe    Other Symptoms:  [ x]All other review of systems negative     Karnofsky Performance Score/Palliative Performance Status Version 2:     40%  PHYSICAL EXAM:  Vital Signs Last 24 Hrs  T(C): 36.4 (29 Oct 2019 15:35), Max: 36.5 (29 Oct 2019 00:15)  T(F): 97.5 (29 Oct 2019 15:35), Max: 97.7 (29 Oct 2019 00:15)  HR: 93 (29 Oct 2019 15:35) (80 - 99)  BP: 149/78 (29 Oct 2019 15:35) (135/70 - 161/76)  BP(mean): --  RR: 18 (29 Oct 2019 15:35) (18 - 18)  SpO2: 97% (29 Oct 2019 15:35) (96% - 98%) I&O's Summary    28 Oct 2019 07:01  -  29 Oct 2019 07:00  --------------------------------------------------------  IN: 900 mL / OUT: 1900 mL / NET: -1000 mL    29 Oct 2019 07:01  -  29 Oct 2019 17:59  --------------------------------------------------------  IN: 280 mL / OUT: 700 mL / NET: -420 mL    GENERAL:  [x ]Alert  [ ]Oriented x   [ ]Lethargic  [ ]Cachexia  [ ]Unarousable  [x ]Verbal  [ ]Non-Verbal  Behavioral:   [ ] Anxiety  [ ] Delirium [ ] Agitation [ ] Other  HEENT:  [ ]Normal   [x ]Dry mouth   [ ]ET Tube/Trach  [ ]Oral lesions  PULMONARY:   [x ]Clear [ ]Tachypnea  [ ]Audible excessive secretions   [ ]Rhonchi        [ ]Right [ ]Left [ ]Bilateral  [ ]Crackles        [ ]Right [ ]Left [ ]Bilateral  [ ]Wheezing     [ ]Right [ ]Left [ ]Bilateral  CARDIOVASCULAR:    [ x]Regular [ ]Irregular [ ]Tachy  [ ]Afshin [ ]Murmur [ ]Other  GASTROINTESTINAL:  [x ]Soft  [ ]Distended   [x ]+BS  [ x]Non tender [ ]Tender  [ ]PEG [ ]OGT/ NGT  Last BM:   GENITOURINARY:  [ ]Normal [ ] Incontinent   [ ]Oliguria/Anuria   [x ]Menon  MUSCULOSKELETAL:   [ ]Normal   [ x]Weakness  [ ]Bed/Wheelchair bound [ ]Edema  NEUROLOGIC:   [x ]No focal deficits  [ ] Cognitive impairment  [ ] Dysphagia [ ]Dysarthria [ ] Paresis [ ]Other   SKIN:ecchymoses   [ ]Normal   [ ]Pressure ulcer(s)  [ ]Rash    CRITICAL CARE:  [ ] Shock Present  [ ]Septic [ ]Cardiogenic [ ]Neurologic [ ]Hypovolemic  [ ]  Vasopressors [ ]  Inotropes   [ ] Respiratory failure present  [ ] Acute  [ ] Chronic [ ] Hypoxic  [ ] Hypercarbic [ ] Other  [ ] Other organ failure     LABS:                        9.6    17.73 )-----------( 582      ( 29 Oct 2019 10:18 )             30.9   10-29    136  |  99  |  15  ----------------------------<  142<H>  4.0   |  25  |  0.31<L>    Ca    10.4      29 Oct 2019 07:53    RADIOLOGY & ADDITIONAL STUDIES:  < from: CT Abdomen and Pelvis w/ Oral Cont and w/ IV Cont (10.22.19 @ 22:45) >    EXAM:  CT ABDOMEN AND PELVIS OC IC                            PROCEDURE DATE:  10/22/2019            INTERPRETATION:  CLINICAL INFORMATION: Patient with breast cancer with   metastatic osseous disease complaining of abdominal pain and distention..    COMPARISON: CT abdomen and pelvis 10/14/2019    PROCEDURE:   CT of the Abdomen and Pelvis was performed with intravenous contrast.   Intravenous contrast: 90 ml Omnipaque 350. 10 ml discarded.  Oral contrast: positive contrast was administered.  Sagittal and coronal reformats were performed.    FINDINGS:    LOWER CHEST: Small left pleural effusion and trace right pleural   effusion. Bibasilar subsegmental atelectasis. Atherosclerotic   calcifications of coronary arteries.    LIVER: Indeterminate right hepatic lobe hypodense lesion measuring 2.0   cm, unchanged from prior study. An additional subcapsular lesion in   segment 3 measuring 1.1 cm (3, 38) is also indeterminate. This lesions   can be further evaluated with liver protocol MRI.  BILE DUCTS: Normal caliber.  GALLBLADDER: Within normal limits.  SPLEEN: Within normal limits.  PANCREAS: Within normal limits.  ADRENALS: Within normal limits.  KIDNEYS/URETERS: Bilateral renal cysts. Mild right hydronephrosis.   BLADDER: Menon catheterin place.  REPRODUCTIVE ORGANS: Uterus and adnexa within normal limits.    BOWEL: No bowel obstruction. Stool is seen throughout the colon. Appendix   is normal.  PERITONEUM: No ascites.  VESSELS: Atherosclerotic changes.  RETROPERITONEUM/LYMPH NODES: No lymphadenopathy.    ABDOMINAL WALL: Within normal limits.  BONES: Extensive osseous lytic lesions in the bony pelvis, vertebral   bodies and metastatic nodules involving the right and bilateral rib and   LEFT lateral 11th rib.    IMPRESSION:     No bowel obstruction.    Mild right hydronephrosis.    Multiple lytic osseous lesions consistent with metastatic disease.    Indeterminate liver lesions.      CAMI CASTILLO MD,RADIOLOGY FELLOW  This document has been electronically signed.  JENNIFER STOCKTON M.D., ATTENDING RADIOLOGIST  This document has been electronically signed. Oct 23 2019 11:05AM      < end of copied text >    PROTEIN CALORIE MALNUTRITION:   [x ] PPSV2 < or = to 30% [ ] significant weight loss  [x ] poor nutritional intake [ x] catabolic state [ ] anasarca     Albumin, Serum: 2.3 g/dL (10-26-19 @ 09:34)  Artificial Nutrition [ ]     REFERRALS:   [ ]Chaplaincy  [ x] Hospice  [ ]Child Life  [ ]Social Work  [ ]Case management [ ]Holistic Therapy   Goals of Care Discussion Document:

## 2019-10-29 NOTE — PROGRESS NOTE ADULT - ASSESSMENT
89 yr old female with hx breast cancer. She had recent endoscopy 9/18/19 for anemia noted for hiatal hernia, gastritis with superficial ulcerations and erosions in the stomach and duodenum. She was previously taking NSAIDs for discomfort associated with Herpes Zoster . On the upper endoscopy she was found to be H. pylori +, no treatment per family request. Hgb and Hct essentially stable now with no report of active GI blood loss . She continues on a PPI     Patient had a CT dated 10/14/19  to further evaluate mild abdominal distention ( unchanged) per report no acute GI process reported, though the CT did reported lytic osseous lesions suspicious for metastatic disease.  Patient had bone scan that reported metastatic disease to the mid and lower thoracic spine, left lower  rib / pelvis and prox right femur   Heme-Onc following       Recommendations:    Continue PPI BID   bowel regimen as ordered; urge meds compliance.  Dulcolax supp x 1 today         PO as tolerated, assist with meals   GOC, discharge planning per primary team    Fernando Salomon PA-C    [facility]  [phone]  After hours and weekend coverage [phone] 89 yr old female with hx breast cancer.  She had recent endoscopy 9/18/19 for anemia noted for hiatal hernia, gastritis with superficial ulcerations and erosions in the stomach and duodenum. She was previously taking NSAIDs for discomfort associated with Herpes Zoster . On the upper endoscopy she was found to be H. pylori +, no treatment per family request. Hgb and Hct essentially stable now with no report of active GI blood loss . She continues on a PPI     Patient had a CT dated 10/14/19  to further evaluate mild abdominal distention ( unchanged) per report no acute GI process reported, though the CT did reported lytic osseous lesions suspicious for metastatic disease.  Patient had bone scan that reported metastatic disease to the mid and lower thoracic spine, left lower  rib / pelvis and prox right femur   Heme-Onc following       Recommendations:    Continue PPI BID   Bowel regimen as ordered; urge meds compliance.  Dulcolax supp x 1 today         PO as tolerated, assist with meals   GOC, discharge planning per primary team    Fernando Salomon PA-C    [facility]  [phone]  After hours and weekend coverage [phone]

## 2019-10-29 NOTE — CONSULT NOTE ADULT - CONSULT REASON
r femur lesion, hx breast ca
anemia, abdominal pain
hx breast ca, with lytic bone lesions
mets
GOC in the setting of metastatic disease, pain
Hearing Loss
Leukocytosis

## 2019-10-29 NOTE — PROGRESS NOTE ADULT - ATTENDING COMMENTS
Yolanda Velazco MD, FACP, FACG, AGAF  [facility]  [phone]     After hours and weekend coverage GI service : [phone]

## 2019-10-29 NOTE — PROGRESS NOTE ADULT - SUBJECTIVE AND OBJECTIVE BOX
Chief Complaint: fu    History of Present Illness: pt seated in chair; reports is uncomfortable and would like to be back in bed; reports general discomfort/pain; no f/c, appetite poor, +weakness      MEDICATIONS  (STANDING):  amLODIPine   Tablet 5 milliGRAM(s) Oral daily  chlorhexidine 2% Cloths 1 Application(s) Topical daily  docusate sodium 100 milliGRAM(s) Oral daily  folic acid 1 milliGRAM(s) Oral daily  furosemide    Tablet 20 milliGRAM(s) Oral daily  metoprolol succinate ER 25 milliGRAM(s) Oral daily  pantoprazole    Tablet 40 milliGRAM(s) Oral two times a day  polyethylene glycol 3350 17 Gram(s) Oral two times a day  senna 2 Tablet(s) Oral two times a day    MEDICATIONS  (PRN):  acetaminophen    Suspension .. 650 milliGRAM(s) Oral every 4 hours PRN Temp greater or equal to 38C (100.4F), Moderate Pain (4 - 6)  bisacodyl Suppository 10 milliGRAM(s) Rectal daily PRN Constipation  morphine Concentrate 5 milliGRAM(s) Oral every 4 hours PRN Moderate Pain (4 - 6)  ondansetron Injectable 4 milliGRAM(s) IV Push every 6 hours PRN Nausea and/or Vomiting  simethicone drops 80 milliGRAM(s) Oral two times a day PRN Gas      Allergies    penicillin (Unknown)    Intolerances        Vital Signs Last 24 Hrs  T(C): 36.2 (29 Oct 2019 10:36), Max: 36.5 (29 Oct 2019 00:15)  T(F): 97.2 (29 Oct 2019 10:36), Max: 97.7 (29 Oct 2019 00:15)  HR: 80 (29 Oct 2019 10:36) (80 - 99)  BP: 135/70 (29 Oct 2019 10:36) (135/70 - 161/76)  BP(mean): --  RR: 18 (29 Oct 2019 10:36) (18 - 18)  SpO2: 98% (29 Oct 2019 10:36) (96% - 98%)    PHYSICAL EXAM  General: adult in NAD  HEENT: clear oropharynx, anicteric sclera, pink conjunctiva  Neck: supple  CV: normal S1/S2  Lungs: decreased BS, poor effort  Abdomen: soft non-tender non-distended, positive bowel sounds  Ext: no clubbing cyanosis or edema  Skin: no rashes and no petechiae  Lymph Nodes: No LAD in axillae, groin, neck    LABS:                          9.6    17.73 )-----------( 582      ( 29 Oct 2019 10:18 )             30.9         Mean Cell Volume : 88.8 fl  Mean Cell Hemoglobin : 27.6 pg  Mean Cell Hemoglobin Concentration : 31.1 gm/dL  Auto Neutrophil # : x  Auto Lymphocyte # : x  Auto Monocyte # : x  Auto Eosinophil # : x  Auto Basophil # : x  Auto Neutrophil % : x  Auto Lymphocyte % : x  Auto Monocyte % : x  Auto Eosinophil % : x  Auto Basophil % : x      Serial CBC's  10-29 @ 10:18  Hct-30.9 / Hgb-9.6 / Plat-582 / RBC-3.48 / WBC-17.73  Serial CBC's  10-28 @ 11:05  Hct-33.9 / Hgb-10.8 / Plat-557 / RBC-3.84 / WBC-15.47  Serial CBC's  10-27 @ 12:01  Hct-23.0 / Hgb-7.3 / Plat-604 / RBC-2.66 / WBC-15.23  Serial CBC's  10-26 @ 12:27  Hct-24.3 / Hgb-7.6 / Plat-580 / RBC-2.80 / WBC-17.24      10-29    136  |  99  |  15  ----------------------------<  142<H>  4.0   |  25  |  0.31<L>    Ca    10.4      29 Oct 2019 07:53                        Radiology:

## 2019-10-29 NOTE — CONSULT NOTE ADULT - PROBLEM SELECTOR RECOMMENDATION 2
heme/onc input appreciated  hospice referral  patient is not a candidate for inpatient hospice at this time

## 2019-10-29 NOTE — CONSULT NOTE ADULT - CONSULT REQUESTED DATE/TIME
24-Oct-2019 04:34
14-Oct-2019 13:00
15-Oct-2019
22-Oct-2019 15:30
29-Oct-2019
21-Oct-2019 05:45
15-Oct-2019 16:08

## 2019-10-29 NOTE — CONSULT NOTE ADULT - REASON FOR ADMISSION
Weakness and anemia

## 2019-10-29 NOTE — CONSULT NOTE ADULT - PROBLEM SELECTOR PROBLEM 2
Malignant neoplasm of breast in female, estrogen receptor positive, unspecified laterality, unspecified site of breast

## 2019-10-29 NOTE — CHART NOTE - NSCHARTNOTEFT_GEN_A_CORE
Discussed at great length with the patient's health care proxy, Dr. Primitivo mckenzie will institute comfort measures.  will discontinue IV fluids  start oral morphine as needed for moderate pain and have Palliative care team come to see patient to help guide plans for further managementAs patient is not amenable to rehabilitation and end of life care and comfort measures are more appropriate at this time. May present some difficulty for patient to go home with hospice In terms of her family situation and support. Patient expresses desire to have comfort measures only

## 2019-10-29 NOTE — PROGRESS NOTE ADULT - SUBJECTIVE AND OBJECTIVE BOX
Subjective: Has been getting progressively weaker.  Patient denies any pain at rest but has some discomfort when moving eating and drinking very little.  Not much appetite    Exam:  Gen: NAD  Neck: no JVD  Chest: normal shape and expansion  Heart: RRR with 2/6 systolic murmur  Lungs: CTAB  Abdomen: Soft, Minimally distended and soft with mild diffuse abdominal tenderness no rebound tenderness, no guarding, no rigidity, no HSM, normoactive BS x 4  Ext: no CCE      Vital Signs Last 24 Hrs  T(C): 36.5 (29 Oct 2019 04:47), Max: 36.5 (28 Oct 2019 08:29)  T(F): 97.7 (29 Oct 2019 04:47), Max: 97.7 (28 Oct 2019 08:29)  HR: 99 (29 Oct 2019 04:47) (94 - 99)  BP: 161/76 (29 Oct 2019 04:47) (146/76 - 165/68)  BP(mean): --  RR: 18 (29 Oct 2019 04:47) (18 - 18)  SpO2: 97% (29 Oct 2019 04:47) (96% - 99%)    Daily     Daily     I&O's Detail    28 Oct 2019 07:01  -  29 Oct 2019 07:00  --------------------------------------------------------  IN:    sodium chloride 0.9%.: 900 mL  Total IN: 900 mL    OUT:    Indwelling Catheter - Urethral: 1900 mL  Total OUT: 1900 mL    Total NET: -1000 mL          Daily     CAPILLARY BLOOD GLUCOSE      POCT Blood Glucose.: 215 mg/dL (28 Oct 2019 17:11)  POCT Blood Glucose.: 169 mg/dL (28 Oct 2019 12:56)  POCT Blood Glucose.: 141 mg/dL (28 Oct 2019 08:19)      MEDICATIONS  (STANDING):  amLODIPine   Tablet 5 milliGRAM(s) Oral daily  chlorhexidine 2% Cloths 1 Application(s) Topical daily  docusate sodium 100 milliGRAM(s) Oral daily  folic acid 1 milliGRAM(s) Oral daily  furosemide    Tablet 20 milliGRAM(s) Oral daily  metoprolol succinate ER 25 milliGRAM(s) Oral daily  pantoprazole    Tablet 40 milliGRAM(s) Oral two times a day  polyethylene glycol 3350 17 Gram(s) Oral two times a day  senna 2 Tablet(s) Oral two times a day  sodium chloride 0.9%. 1000 milliLiter(s) (75 mL/Hr) IV Continuous <Continuous>    MEDICATIONS  (PRN):  acetaminophen    Suspension .. 650 milliGRAM(s) Oral every 4 hours PRN Temp greater or equal to 38C (100.4F), Moderate Pain (4 - 6)  bisacodyl Suppository 10 milliGRAM(s) Rectal daily PRN Constipation  ondansetron Injectable 4 milliGRAM(s) IV Push every 6 hours PRN Nausea and/or Vomiting  simethicone drops 80 milliGRAM(s) Oral two times a day PRN Gas      Labs:                                     10.8   15.47 )-----------( 557      ( 28 Oct 2019 11:05 )             33.9   baso x      eos x      imm gran x      lymph x      mono x      poly x                            7.3    15.23 )-----------( 604      ( 27 Oct 2019 12:01 )             23.0   baso x      eos x      imm gran x      lymph x      mono x      poly x                            7.6    17.24 )-----------( 580      ( 26 Oct 2019 12:27 )             24.3   baso x      eos x      imm gran x      lymph x      mono x      poly x        10-28 @ 08:56    138  |  101  |  14  ----------------------------<  145<H>  3.9   |  26  |  0.32<L>    10-27 @ 08:51    133<L>  |  94<L>  |  15  ----------------------------<  149<H>  3.3<L>   |  28  |  0.36<L>    10-26 @ 09:34    136  |  94<L>  |  14  ----------------------------<  153<H>  3.4<L>   |  28  |  0.39<L>    10-24 @ 09:11    134<L>  |  94<L>  |  18  ----------------------------<  141<H>  3.9   |  27  |  0.41<L>      Ca    9.5      10-28 @ 08:56  Ca    9.9      10-27 @ 08:51  Ca    10.0      10-26 @ 09:34  Ca    9.9      10-24 @ 09:11    TPro  6.2  /  Alb  2.3<L>  /  TBili  0.3  /  DBili  x   /  AST  20  /  ALT  56<H>  /  AlkPhos  207<H>  10-26 @ 09:34

## 2019-10-29 NOTE — CONSULT NOTE ADULT - PROBLEM SELECTOR RECOMMENDATION 4
HCP is Primitivo  Patient is able to participate and will have further discussions with her regarding her wishes. She had previously stated she wanted to go home. ongoing GOC discussions. [facility] following

## 2019-10-29 NOTE — PROGRESS NOTE ADULT - ATTENDING COMMENTS
11. Hypokalemia follow supplement as needed  12. Leukocytosis repeat White blood cell count stable no obvious source or sign of infection.  Hold off on antibiotics at this time  13. Right middle lobe mucoid impaction no treatment needed at this time  14. Urinary retention schneider dcd follow PVR  15. HTN follow BP contin current meds    Gamal Martinez MD, FAAP  office [phone]  cell [phone]

## 2019-10-29 NOTE — PROGRESS NOTE ADULT - SUBJECTIVE AND OBJECTIVE BOX
Patient is a 89y old  Female who presented with a chief complaint of Weakness and anemia (29 Oct 2019 07:36)      INTERVAL HPI/OVERNIGHT EVENTS:  no BM yesterday  Has been refusing Miralax  s/p PRBCs yesterday  no rectal bleeding or melena  no overnight events reported  no nausea or vomiting    MEDICATIONS  (STANDING):  amLODIPine   Tablet 5 milliGRAM(s) Oral daily  chlorhexidine 2% Cloths 1 Application(s) Topical daily  docusate sodium 100 milliGRAM(s) Oral daily  folic acid 1 milliGRAM(s) Oral daily  furosemide    Tablet 20 milliGRAM(s) Oral daily  metoprolol succinate ER 25 milliGRAM(s) Oral daily  pantoprazole    Tablet 40 milliGRAM(s) Oral two times a day  polyethylene glycol 3350 17 Gram(s) Oral two times a day  senna 2 Tablet(s) Oral two times a day  sodium chloride 0.9%. 1000 milliLiter(s) (75 mL/Hr) IV Continuous <Continuous>    MEDICATIONS  (PRN):  acetaminophen    Suspension .. 650 milliGRAM(s) Oral every 4 hours PRN Temp greater or equal to 38C (100.4F), Moderate Pain (4 - 6)  bisacodyl Suppository 10 milliGRAM(s) Rectal daily PRN Constipation  ondansetron Injectable 4 milliGRAM(s) IV Push every 6 hours PRN Nausea and/or Vomiting  simethicone drops 80 milliGRAM(s) Oral two times a day PRN Gas      Allergies  penicillin (Unknown)        Review of Systems:  General:  No fevers or chills    ENT:  denies any nausea or vomiting , denies  dysphagia  CV:  No chest pain  Resp:  No cough or  wheezing  GI: see HPI    Vital Signs Last 24 Hrs  T(C): 36.5 (29 Oct 2019 04:47), Max: 36.5 (29 Oct 2019 00:15)  T(F): 97.7 (29 Oct 2019 04:47), Max: 97.7 (29 Oct 2019 00:15)  HR: 99 (29 Oct 2019 04:47) (94 - 99)  BP: 161/76 (29 Oct 2019 04:47) (146/76 - 161/76)  BP(mean): --  RR: 18 (29 Oct 2019 04:47) (18 - 18)  SpO2: 97% (29 Oct 2019 04:47) (96% - 97%)    PHYSICAL EXAM:  Constitutional: nontoxic in NAD, frail elderly WF appears comfortable  Neck:  supple  Respiratory: anterior chest wall clear to auscultation  Cardiovascular: S1 and S2 +murmur  Gastrointestinal: soft rounded/distended NT no rebound or rigidity +BS  Extremities: No peripheral edema, or cyanosis  Neurological: A/O x 2 no focal asymmetry  Skin: No visible  rashes    LABS:                        9.6    17.73 )-----------( 582      ( 29 Oct 2019 10:18 )             30.9     Hemoglobin: 10.8 g/dL <L> [11.5 - 15.5] (10-28 @ 11:05)  s/p 1 unit PRBCs  Hemoglobin: 7.3 g/dL <L> [11.5 - 15.5] (10-27 @ 12:01)  Hemoglobin: 7.6 g/dL <L> [11.5 - 15.5] (10-26 @ 12:27)    Mean Cell Volume: 88.8 fl (10.29.19 @ 10:18)        10-29    136  |  99  |  15  ----------------------------<  142<H>  4.0   |  25  |  0.31<L>    Ca    10.4      29 Oct 2019 07:53        RADIOLOGY & ADDITIONAL TESTS: Patient is a 89y old  Female who presented with a chief complaint of Weakness and anemia (29 Oct 2019 07:36)    INTERVAL HPI/OVERNIGHT EVENTS:  no BM yesterday  Has been refusing Miralax  s/p PRBCs yesterday  no rectal bleeding or melena  no overnight events reported  no nausea or vomiting    MEDICATIONS  (STANDING):  amLODIPine   Tablet 5 milliGRAM(s) Oral daily  chlorhexidine 2% Cloths 1 Application(s) Topical daily  docusate sodium 100 milliGRAM(s) Oral daily  folic acid 1 milliGRAM(s) Oral daily  furosemide    Tablet 20 milliGRAM(s) Oral daily  metoprolol succinate ER 25 milliGRAM(s) Oral daily  pantoprazole    Tablet 40 milliGRAM(s) Oral two times a day  polyethylene glycol 3350 17 Gram(s) Oral two times a day  senna 2 Tablet(s) Oral two times a day  sodium chloride 0.9%. 1000 milliLiter(s) (75 mL/Hr) IV Continuous <Continuous>    MEDICATIONS  (PRN):  acetaminophen    Suspension .. 650 milliGRAM(s) Oral every 4 hours PRN Temp greater or equal to 38C (100.4F), Moderate Pain (4 - 6)  bisacodyl Suppository 10 milliGRAM(s) Rectal daily PRN Constipation  ondansetron Injectable 4 milliGRAM(s) IV Push every 6 hours PRN Nausea and/or Vomiting  simethicone drops 80 milliGRAM(s) Oral two times a day PRN Gas    Allergies  penicillin (Unknown)    Review of Systems:  General:  No fevers or chills    ENT:  denies any nausea or vomiting , denies  dysphagia  CV:  No chest pain  Resp:  No cough or  wheezing  GI: see HPI    Vital Signs Last 24 Hrs  T(C): 36.5 (29 Oct 2019 04:47), Max: 36.5 (29 Oct 2019 00:15)  T(F): 97.7 (29 Oct 2019 04:47), Max: 97.7 (29 Oct 2019 00:15)  HR: 99 (29 Oct 2019 04:47) (94 - 99)  BP: 161/76 (29 Oct 2019 04:47) (146/76 - 161/76)  BP(mean): --  RR: 18 (29 Oct 2019 04:47) (18 - 18)  SpO2: 97% (29 Oct 2019 04:47) (96% - 97%)    PHYSICAL EXAM:  Constitutional: nontoxic in NAD, frail elderly WF appears comfortable  Neck:  supple  Respiratory: anterior chest wall clear to auscultation  Cardiovascular: S1 and S2 +murmur  Gastrointestinal: soft rounded/distended NT no rebound or rigidity +BS  Extremities: No peripheral edema, or cyanosis  Neurological: A/O x 2 no focal asymmetry  Skin: No visible  rashes    LABS:                      9.6    17.73 )-----------( 582      ( 29 Oct 2019 10:18 )             30.9     Hemoglobin: 10.8 g/dL <L> [11.5 - 15.5] (10-28 @ 11:05)  s/p 1 unit PRBCs  Hemoglobin: 7.3 g/dL <L> [11.5 - 15.5] (10-27 @ 12:01)  Hemoglobin: 7.6 g/dL <L> [11.5 - 15.5] (10-26 @ 12:27)    Mean Cell Volume: 88.8 fl (10.29.19 @ 10:18)    10-29    136  |  99  |  15  ----------------------------<  142<H>  4.0   |  25  |  0.31<L>    Ca    10.4      29 Oct 2019 07:53    RADIOLOGY & ADDITIONAL TESTS:

## 2019-10-30 LAB
GLUCOSE BLDC GLUCOMTR-MCNC: 141 MG/DL — HIGH (ref 70–99)
GLUCOSE BLDC GLUCOMTR-MCNC: 157 MG/DL — HIGH (ref 70–99)
GLUCOSE BLDC GLUCOMTR-MCNC: 166 MG/DL — HIGH (ref 70–99)

## 2019-10-30 PROCEDURE — 99231 SBSQ HOSP IP/OBS SF/LOW 25: CPT

## 2019-10-30 PROCEDURE — 99233 SBSQ HOSP IP/OBS HIGH 50: CPT

## 2019-10-30 RX ADMIN — SENNA PLUS 2 TABLET(S): 8.6 TABLET ORAL at 06:29

## 2019-10-30 RX ADMIN — CHLORHEXIDINE GLUCONATE 1 APPLICATION(S): 213 SOLUTION TOPICAL at 12:36

## 2019-10-30 RX ADMIN — Medication 25 MILLIGRAM(S): at 06:28

## 2019-10-30 RX ADMIN — SENNA PLUS 2 TABLET(S): 8.6 TABLET ORAL at 17:01

## 2019-10-30 RX ADMIN — POLYETHYLENE GLYCOL 3350 17 GRAM(S): 17 POWDER, FOR SOLUTION ORAL at 06:29

## 2019-10-30 RX ADMIN — Medication 650 MILLIGRAM(S): at 09:53

## 2019-10-30 RX ADMIN — Medication 100 MILLIGRAM(S): at 12:37

## 2019-10-30 RX ADMIN — Medication 650 MILLIGRAM(S): at 20:57

## 2019-10-30 RX ADMIN — Medication 650 MILLIGRAM(S): at 12:39

## 2019-10-30 RX ADMIN — AMLODIPINE BESYLATE 5 MILLIGRAM(S): 2.5 TABLET ORAL at 06:28

## 2019-10-30 RX ADMIN — Medication 650 MILLIGRAM(S): at 18:32

## 2019-10-30 RX ADMIN — Medication 1 MILLIGRAM(S): at 12:36

## 2019-10-30 RX ADMIN — Medication 20 MILLIGRAM(S): at 06:28

## 2019-10-30 RX ADMIN — PANTOPRAZOLE SODIUM 40 MILLIGRAM(S): 20 TABLET, DELAYED RELEASE ORAL at 06:28

## 2019-10-30 RX ADMIN — Medication 650 MILLIGRAM(S): at 17:01

## 2019-10-30 RX ADMIN — Medication 650 MILLIGRAM(S): at 19:57

## 2019-10-30 RX ADMIN — PANTOPRAZOLE SODIUM 40 MILLIGRAM(S): 20 TABLET, DELAYED RELEASE ORAL at 17:01

## 2019-10-30 RX ADMIN — Medication 650 MILLIGRAM(S): at 13:00

## 2019-10-30 RX ADMIN — POLYETHYLENE GLYCOL 3350 17 GRAM(S): 17 POWDER, FOR SOLUTION ORAL at 17:00

## 2019-10-30 RX ADMIN — Medication 650 MILLIGRAM(S): at 06:28

## 2019-10-30 NOTE — PROGRESS NOTE ADULT - ASSESSMENT
89 yr old female with hx breast cancer.  She had recent endoscopy 9/18/19 for anemia noted for hiatal hernia, gastritis with superficial ulcerations and erosions in the stomach and duodenum. She was previously taking NSAIDs for discomfort associated with Herpes Zoster . On the upper endoscopy she was found to be H. pylori +, no treatment per family request. Hgb and Hct essentially stable now with no report of active GI blood loss . She continues on a PPI     Patient had a CT dated 10/14/19  to further evaluate mild abdominal distention ( unchanged) per report no acute GI process reported, though the CT did reported lytic osseous lesions suspicious for metastatic disease.  Patient had bone scan that reported metastatic disease to the mid and lower thoracic spine, left lower  rib / pelvis and prox right femur   Heme-Onc following       Recommendations:    Continue PPI BID   Bowel regimen as ordered      PO as tolerated, assist with meals   Palliative following, plans for home with hospice care per discussion with primary team    Will Sign off care. Please recall prn for GI concerns.  Thank You.    Fernando Salomon PA-C    [facility]  [phone]  After hours and weekend coverage [phone]

## 2019-10-30 NOTE — PROGRESS NOTE ADULT - SUBJECTIVE AND OBJECTIVE BOX
Subjective: Has been getting progressively weaker.  Patient denies any pain at rest but has some discomfort when moving. seen by palliative care and hospice care network    Exam:  Gen: NAD  Neck: no JVD  Chest: normal shape and expansion  Heart: RRR with 2/6 systolic murmur  Lungs: CTAB  Abdomen: Soft, Minimally distended and soft with mild diffuse abdominal tenderness no rebound tenderness, no guarding, no rigidity, no HSM, normoactive BS x 4  Ext: no CCE      Vital Signs Last 24 Hrs  T(C): 36.7 (30 Oct 2019 09:30), Max: 36.8 (29 Oct 2019 23:50)  T(F): 98.1 (30 Oct 2019 09:30), Max: 98.2 (29 Oct 2019 23:50)  HR: 119 (30 Oct 2019 09:30) (92 - 119)  BP: 145/85 (30 Oct 2019 09:30) (145/67 - 151/81)  BP(mean): --  RR: 18 (30 Oct 2019 09:30) (18 - 18)  SpO2: 97% (30 Oct 2019 09:30) (97% - 97%)    Daily     Daily     I&O's Detail    29 Oct 2019 07:01  -  30 Oct 2019 07:00  --------------------------------------------------------  IN:    Oral Fluid: 280 mL  Total IN: 280 mL    OUT:    Indwelling Catheter - Urethral: 900 mL  Total OUT: 900 mL    Total NET: -620 mL          Daily     CAPILLARY BLOOD GLUCOSE      POCT Blood Glucose.: 157 mg/dL (30 Oct 2019 12:26)  POCT Blood Glucose.: 141 mg/dL (30 Oct 2019 08:01)  POCT Blood Glucose.: 134 mg/dL (29 Oct 2019 19:21)      MEDICATIONS  (STANDING):  acetaminophen   Tablet .. 650 milliGRAM(s) Oral every 6 hours  amLODIPine   Tablet 5 milliGRAM(s) Oral daily  chlorhexidine 2% Cloths 1 Application(s) Topical daily  docusate sodium 100 milliGRAM(s) Oral daily  folic acid 1 milliGRAM(s) Oral daily  furosemide    Tablet 20 milliGRAM(s) Oral daily  metoprolol succinate ER 25 milliGRAM(s) Oral daily  pantoprazole    Tablet 40 milliGRAM(s) Oral two times a day  polyethylene glycol 3350 17 Gram(s) Oral two times a day  senna 2 Tablet(s) Oral two times a day    MEDICATIONS  (PRN):  acetaminophen    Suspension .. 650 milliGRAM(s) Oral every 4 hours PRN Temp greater or equal to 38C (100.4F), Moderate Pain (4 - 6)  bisacodyl Suppository 10 milliGRAM(s) Rectal daily PRN Constipation  morphine   Solution 2.5 milliGRAM(s) Oral every 4 hours PRN moderate or severe pain  ondansetron Injectable 4 milliGRAM(s) IV Push every 6 hours PRN Nausea and/or Vomiting  simethicone drops 80 milliGRAM(s) Oral two times a day PRN Gas      Labs:                          9.6    17.73 )-----------( 582      ( 29 Oct 2019 10:18 )             30.9       10-29    136  |  99  |  15  ----------------------------<  142<H>  4.0   |  25  |  0.31<L>    Ca    10.4      29 Oct 2019 07:53

## 2019-10-30 NOTE — PROGRESS NOTE ADULT - SUBJECTIVE AND OBJECTIVE BOX
SUBJECTIVE AND OBJECTIVE:  INTERVAL HPI/OVERNIGHT EVENTS:    DNR on chart: Yes    Allergies    penicillin (Unknown)    Intolerances    MEDICATIONS  (STANDING):  acetaminophen   Tablet .. 650 milliGRAM(s) Oral every 6 hours  amLODIPine   Tablet 5 milliGRAM(s) Oral daily  chlorhexidine 2% Cloths 1 Application(s) Topical daily  docusate sodium 100 milliGRAM(s) Oral daily  folic acid 1 milliGRAM(s) Oral daily  furosemide    Tablet 20 milliGRAM(s) Oral daily  metoprolol succinate ER 25 milliGRAM(s) Oral daily  pantoprazole    Tablet 40 milliGRAM(s) Oral two times a day  polyethylene glycol 3350 17 Gram(s) Oral two times a day  senna 2 Tablet(s) Oral two times a day    MEDICATIONS  (PRN):  acetaminophen    Suspension .. 650 milliGRAM(s) Oral every 4 hours PRN Temp greater or equal to 38C (100.4F), Moderate Pain (4 - 6)  bisacodyl Suppository 10 milliGRAM(s) Rectal daily PRN Constipation  morphine   Solution 2.5 milliGRAM(s) Oral every 4 hours PRN moderate or severe pain  ondansetron Injectable 4 milliGRAM(s) IV Push every 6 hours PRN Nausea and/or Vomiting  simethicone drops 80 milliGRAM(s) Oral two times a day PRN Gas      ITEMS UNCHECKED ARE NOT PRESENT    PRESENT SYMPTOMS: [ ]Unable to obtain due to poor mentation   Source if other than patient:  [ ]Family   [ ]Team     Pain (Impact on QOL):    Location:  Minimal acceptable level (0-10 scale):                   Aggravating factors:  Quality:  Radiation:  Severity (0-10 scale):    Timing:    Dyspnea:                           [ ]Mild [ ]Moderate [ ]Severe  Anxiety:                             [ ]Mild [ ]Moderate [ ]Severe  Fatigue:                             [ ]Mild [ ]Moderate [ ]Severe  Nausea:                             [ ]Mild [ ]Moderate [ ]Severe  Loss of appetite:              [ ]Mild [ ]Moderate [ ]Severe  Constipation:                    [ ]Mild [ ]Moderate [ ]Severe    PAIN AD Score:	  [email or web address]/painad.pdf (Ctrl + left click to view)    Other Symptoms:  [ ]All other review of systems negative     Karnofsky Performance Score/Palliative Performance Status Version 2:         %    http://palliative.info/resource_material/PPSv2.pdf  PHYSICAL EXAM:  Vital Signs Last 24 Hrs  T(C): 36.7 (30 Oct 2019 09:30), Max: 36.8 (29 Oct 2019 23:50)  T(F): 98.1 (30 Oct 2019 09:30), Max: 98.2 (29 Oct 2019 23:50)  HR: 119 (30 Oct 2019 09:30) (80 - 119)  BP: 145/85 (30 Oct 2019 09:30) (135/70 - 151/81)  BP(mean): --  RR: 18 (30 Oct 2019 09:30) (18 - 18)  SpO2: 97% (30 Oct 2019 09:30) (97% - 98%) I&O's Summary    29 Oct 2019 07:01  -  30 Oct 2019 07:00  --------------------------------------------------------  IN: 280 mL / OUT: 900 mL / NET: -620 mL     GENERAL:  [ ]Alert  [ ]Oriented x   [ ]Lethargic  [ ]Cachexia  [ ]Unarousable  [ ]Verbal  [ ]Non-Verbal  Behavioral:   [ ] Anxiety  [ ] Delirium [ ] Agitation [ ] Other  HEENT:  [ ]Normal   [ ]Dry mouth   [ ]ET Tube/Trach  [ ]Oral lesions  PULMONARY:   [ ]Clear [ ]Tachypnea  [ ]Audible excessive secretions   [ ]Rhonchi        [ ]Right [ ]Left [ ]Bilateral  [ ]Crackles        [ ]Right [ ]Left [ ]Bilateral  [ ]Wheezing     [ ]Right [ ]Left [ ]Bilateral  CARDIOVASCULAR:    [ ]Regular [ ]Irregular [ ]Tachy  [ ]Afshin [ ]Murmur [ ]Other  GASTROINTESTINAL:  [ ]Soft  [ ]Distended   [ ]+BS  [ ]Non tender [ ]Tender  [ ]PEG [ ]OGT/ NGT   Last BM:    GENITOURINARY:  [ ]Normal [ ] Incontinent   [ ]Oliguria/Anuria   [ ]Men[place]  MUSCULOSKELETAL:   [ ]Normal   [ ]Weakness  [ ]Bed/Wheelchair bound [ ]Edema  NEUROLOGIC:   [ ]No focal deficits  [ ] Cognitive impairment  [ ] Dysphagia [ ]Dysarthria [ ] Paresis [ ]Other   SKIN:   [ ]Normal   [ ]Pressure ulcer(s)  [ ]Rash    CRITICAL CARE:  [ ] Shock Present  [ ]Septic [ ]Cardiogenic [ ]Neurologic [ ]Hypovolemic  [ ]  Vasopressors [ ]  Inotropes   [ ] Respiratory failure present  [ ] Acute  [ ] Chronic [ ] Hypoxic  [ ] Hypercarbic [ ] Other  [ ] Other organ failure     LABS:                        9.6    17.73 )-----------( 582      ( 29 Oct 2019 10:18 )             30.9   10-29    136  |  99  |  15  ----------------------------<  142<H>  4.0   |  25  |  0.31<L>    Ca    10.4      29 Oct 2019 07:53          RADIOLOGY & ADDITIONAL STUDIES:    Protein Calorie Malnutrition Present: [ ] yes [ ] no  [ ] PPSV2 < or = 30%  [ ] significant weight loss [ ] poor nutritional intake [ ] anasarca [ ] catabolic state Albumin, Serum: 2.3 g/dL (10-26-19 @ 09:34)  Artificial Nutrition [ ]     REFERRALS:   [ ]Chaplaincy  [ ] Hospice  [ ]Child Life  [ ]Social Work  [ ]Case management [ ]Holistic Therapy   Goals of Care Document: SUBJECTIVE AND OBJECTIVE: Patient seen and examined. She states she does not have pain, did not require any PRN morphine. On discussion with patient today, she states her goal is to go home   INTERVAL HPI/OVERNIGHT EVENTS:    DNR on chart: Yes    Allergies    penicillin (Unknown)    Intolerances    MEDICATIONS  (STANDING):  acetaminophen   Tablet .. 650 milliGRAM(s) Oral every 6 hours  amLODIPine   Tablet 5 milliGRAM(s) Oral daily  chlorhexidine 2% Cloths 1 Application(s) Topical daily  docusate sodium 100 milliGRAM(s) Oral daily  folic acid 1 milliGRAM(s) Oral daily  furosemide    Tablet 20 milliGRAM(s) Oral daily  metoprolol succinate ER 25 milliGRAM(s) Oral daily  pantoprazole    Tablet 40 milliGRAM(s) Oral two times a day  polyethylene glycol 3350 17 Gram(s) Oral two times a day  senna 2 Tablet(s) Oral two times a day    MEDICATIONS  (PRN):  acetaminophen    Suspension .. 650 milliGRAM(s) Oral every 4 hours PRN Temp greater or equal to 38C (100.4F), Moderate Pain (4 - 6)  bisacodyl Suppository 10 milliGRAM(s) Rectal daily PRN Constipation  morphine   Solution 2.5 milliGRAM(s) Oral every 4 hours PRN moderate or severe pain  ondansetron Injectable 4 milliGRAM(s) IV Push every 6 hours PRN Nausea and/or Vomiting  simethicone drops 80 milliGRAM(s) Oral two times a day PRN Gas      ITEMS UNCHECKED ARE NOT PRESENT    PRESENT SYMPTOMS: [ ]Unable to obtain due to poor mentation   Source if other than patient:  [ ]Family   [ ]Team     Pain (Impact on QOL):    Location:  Minimal acceptable level (0-10 scale):                   Aggravating factors:  Quality:  Radiation:  Severity (0-10 scale):    Timing:    Dyspnea:                           [ ]Mild [ ]Moderate [ ]Severe  Anxiety:                             [ ]Mild [ ]Moderate [ ]Severe  Fatigue:                             [ ]Mild [ ]Moderate [ ]Severe  Nausea:                             [ ]Mild [ ]Moderate [ ]Severe  Loss of appetite:              [ ]Mild [ ]Moderate [ ]Severe  Constipation:                    [ ]Mild [ ]Moderate [ ]Severe    PAIN AD Score:	  http://geriatrictoolkit.[facility]/cog/painad.pdf (Ctrl + left click to view)    Other Symptoms:  [ ]All other review of systems negative     Karnofsky Performance Score/Palliative Performance Status Version 2:         %    http://palliative.info/resource_material/PPSv2.pdf  PHYSICAL EXAM:  Vital Signs Last 24 Hrs  T(C): 36.7 (30 Oct 2019 09:30), Max: 36.8 (29 Oct 2019 23:50)  T(F): 98.1 (30 Oct 2019 09:30), Max: 98.2 (29 Oct 2019 23:50)  HR: 119 (30 Oct 2019 09:30) (80 - 119)  BP: 145/85 (30 Oct 2019 09:30) (135/70 - 151/81)  BP(mean): --  RR: 18 (30 Oct 2019 09:30) (18 - 18)  SpO2: 97% (30 Oct 2019 09:30) (97% - 98%) I&O's Summary    29 Oct 2019 07:01  -  30 Oct 2019 07:00  --------------------------------------------------------  IN: 280 mL / OUT: 900 mL / NET: -620 mL     GENERAL:  [ ]Alert  [ ]Oriented x   [ ]Lethargic  [ ]Cachexia  [ ]Unarousable  [ ]Verbal  [ ]Non-Verbal  Behavioral:   [ ] Anxiety  [ ] Delirium [ ] Agitation [ ] Other  HEENT:  [ ]Normal   [ ]Dry mouth   [ ]ET Tube/Trach  [ ]Oral lesions  PULMONARY:   [ ]Clear [ ]Tachypnea  [ ]Audible excessive secretions   [ ]Rhonchi        [ ]Right [ ]Left [ ]Bilateral  [ ]Crackles        [ ]Right [ ]Left [ ]Bilateral  [ ]Wheezing     [ ]Right [ ]Left [ ]Bilateral  CARDIOVASCULAR:    [ ]Regular [ ]Irregular [ ]Tachy  [ ]Afshin [ ]Murmur [ ]Other  GASTROINTESTINAL:  [ ]Soft  [ ]Distended   [ ]+BS  [ ]Non tender [ ]Tender  [ ]PEG [ ]OGT/ NGT   Last BM:    GENITOURINARY:  [ ]Normal [ ] Incontinent   [ ]Oliguria/Anuria   [ ]Menon  MUSCULOSKELETAL:   [ ]Normal   [ ]Weakness  [ ]Bed/Wheelchair bound [ ]Edema  NEUROLOGIC:   [ ]No focal deficits  [ ] Cognitive impairment  [ ] Dysphagia [ ]Dysarthria [ ] Paresis [ ]Other   SKIN:   [ ]Normal   [ ]Pressure ulcer(s)  [ ]Rash    CRITICAL CARE:  [ ] Shock Present  [ ]Septic [ ]Cardiogenic [ ]Neurologic [ ]Hypovolemic  [ ]  Vasopressors [ ]  Inotropes   [ ] Respiratory failure present  [ ] Acute  [ ] Chronic [ ] Hypoxic  [ ] Hypercarbic [ ] Other  [ ] Other organ failure     LABS:                        9.6    17.73 )-----------( 582      ( 29 Oct 2019 10:18 )             30.9   10-29    136  |  99  |  15  ----------------------------<  142<H>  4.0   |  25  |  0.31<L>    Ca    10.4      29 Oct 2019 07:53          RADIOLOGY & ADDITIONAL STUDIES:    Protein Calorie Malnutrition Present: [ ] yes [ ] no  [ ] PPSV2 < or = 30%  [ ] significant weight loss [ ] poor nutritional intake [ ] anasarca [ ] catabolic state Albumin, Serum: 2.3 g/dL (10-26-19 @ 09:34)  Artificial Nutrition [ ]     REFERRALS:   [ ]Chaplaincy  [ ] Hospice  [ ]Child Life  [ ]Social Work  [ ]Case management [ ]Holistic Therapy   Goals of Care Document: SUBJECTIVE AND OBJECTIVE: Patient seen and examined. She states she does not have pain, did not require any PRN morphine. On discussion with patient today, she states her goal is to go home and "close her eyes." she knows she has a "bad illness" but she also knows her son is stuggling with the idea of her dying. Emotional support provided.     Son Gerardo contact [facility] today to further discuss home hospice services. They are to meet later today.    INTERVAL HPI/OVERNIGHT EVENTS: no acute overnight events.     DNR on chart: Yes    Allergies    penicillin (Unknown)    Intolerances    MEDICATIONS  (STANDING):  acetaminophen   Tablet .. 650 milliGRAM(s) Oral every 6 hours  amLODIPine   Tablet 5 milliGRAM(s) Oral daily  chlorhexidine 2% Cloths 1 Application(s) Topical daily  docusate sodium 100 milliGRAM(s) Oral daily  folic acid 1 milliGRAM(s) Oral daily  furosemide    Tablet 20 milliGRAM(s) Oral daily  metoprolol succinate ER 25 milliGRAM(s) Oral daily  pantoprazole    Tablet 40 milliGRAM(s) Oral two times a day  polyethylene glycol 3350 17 Gram(s) Oral two times a day  senna 2 Tablet(s) Oral two times a day    MEDICATIONS  (PRN):  acetaminophen    Suspension .. 650 milliGRAM(s) Oral every 4 hours PRN Temp greater or equal to 38C (100.4F), Moderate Pain (4 - 6)  bisacodyl Suppository 10 milliGRAM(s) Rectal daily PRN Constipation  morphine   Solution 2.5 milliGRAM(s) Oral every 4 hours PRN moderate or severe pain  ondansetron Injectable 4 milliGRAM(s) IV Push every 6 hours PRN Nausea and/or Vomiting  simethicone drops 80 milliGRAM(s) Oral two times a day PRN Gas      ITEMS UNCHECKED ARE NOT PRESENT    PRESENT SYMPTOMS: [ ]Unable to obtain due to poor mentation   Source if other than patient:  [ ]Family   [ ]Team     Pain (Impact on QOL):    Location:  Minimal acceptable level (0-10 scale):                   Aggravating factors:  Quality:  Radiation:  Severity (0-10 scale):    Timing:    Dyspnea:                           [ ]Mild [ ]Moderate [ ]Severe  Anxiety:                             [ ]Mild [ ]Moderate [ ]Severe  Fatigue:                             [ ]Mild [ ]Moderate [ ]Severe  Nausea:                             [ ]Mild [ ]Moderate [ ]Severe  Loss of appetite:              [ ]Mild [ ]Moderate [ ]Severe  Constipation:                    [ ]Mild [ ]Moderate [ ]Severe    PAIN AD Score:	  [email or web address]/cog/painad.pdf (Ctrl + left click to view)    Other Symptoms:  [ ]All other review of systems negative     Karnofsky Performance Score/Palliative Performance Status Version 2:        40 %    http://palliative.info/resource_material/[email or web address].pdf  PHYSICAL EXAM:  Vital Signs Last 24 Hrs  T(C): 36.7 (30 Oct 2019 09:30), Max: 36.8 (29 Oct 2019 23:50)  T(F): 98.1 (30 Oct 2019 09:30), Max: 98.2 (29 Oct 2019 23:50)  HR: 119 (30 Oct 2019 09:30) (80 - 119)  BP: 145/85 (30 Oct 2019 09:30) (135/70 - 151/81)  BP(mean): --  RR: 18 (30 Oct 2019 09:30) (18 - 18)  SpO2: 97% (30 Oct 2019 09:30) (97% - 98%) I&O's Summary    29 Oct 2019 07:01  -  30 Oct 2019 07:00  --------------------------------------------------------  IN: 280 mL / OUT: 900 mL / NET: -620 mL     GENERAL:  [x ]Alert  [x ]Oriented x2   [ ]Lethargic  [ ]Cachexia  [ ]Unarousable  [ ]Verbal  [ ]Non-Verbal  Behavioral:   [ ] Anxiety  [ x] Delirium [ ] Agitation [ ] Other  HEENT:  [ ]Normal   [x ]Dry mouth   [ ]ET Tube/Trach  [ ]Oral lesions  PULMONARY:   [ x]Clear [ ]Tachypnea  [ ]Audible excessive secretions   [ ]Rhonchi        [ ]Right [ ]Left [ ]Bilateral  [ ]Crackles        [ ]Right [ ]Left [ ]Bilateral  [ ]Wheezing     [ ]Right [ ]Left [ ]Bilateral  CARDIOVASCULAR:    [ ]Regular [ ]Irregular [x ]Tachy  [ ]Afshin [ ]Murmur [ ]Other  GASTROINTESTINAL:  [x ]Soft  [ ]Distended   [x ]+BS  [ x]Non tender [ ]Tender  [ ]PEG [ ]OGT/ NGT   Last BM:    GENITOURINARY:  [ ]Normal [ ] Incontinent   [ ]Oliguria/Anuria   [x ]Menon  MUSCULOSKELETAL:   [ ]Normal   [ ]Weakness  [x ]Bed/Wheelchair bound [ ]Edema  NEUROLOGIC:   [ ]No focal deficits  [ x] Cognitive impairment  [ ] Dysphagia [ ]Dysarthria [ ] Paresis [ ]Other   SKIN: ecchymoses  [ ]Normal   [ ]Pressure ulcer(s)  [ ]Rash    CRITICAL CARE:  [ ] Shock Present  [ ]Septic [ ]Cardiogenic [ ]Neurologic [ ]Hypovolemic  [ ]  Vasopressors [ ]  Inotropes   [ ] Respiratory failure present  [ ] Acute  [ ] Chronic [ ] Hypoxic  [ ] Hypercarbic [ ] Other  [ ] Other organ failure     LABS:                        9.6    17.73 )-----------( 582      ( 29 Oct 2019 10:18 )             30.9   10-29    136  |  99  |  15  ----------------------------<  142<H>  4.0   |  25  |  0.31<L>    Ca    10.4      29 Oct 2019 07:53    RADIOLOGY & ADDITIONAL STUDIES: no new imaging studies    Protein Calorie Malnutrition Present: [ ] yes [ x] no  [x ] PPSV2 < or = 30%  [ ] significant weight loss [xx ] poor nutritional intake [ ] anasarca [x ] catabolic state Albumin, Serum: 2.3 g/dL (10-26-19 @ 09:34)  Artificial Nutrition [ ]     REFERRALS:   [ ]Chaplaincy  [ ] Hospice  [ ]Child Life  [ ]Social Work  [ x]Case management [ ]Holistic Therapy   Goals of Care Document:

## 2019-10-30 NOTE — PROGRESS NOTE ADULT - SUBJECTIVE AND OBJECTIVE BOX
Patient is a 89y old  Female who presented with a chief complaint of Weakness and anemia (30 Oct 2019 10:30)      INTERVAL HPI/OVERNIGHT EVENTS:  +BM today  awaiting hospice evaluation for planned home with hospice care    MEDICATIONS  (STANDING):  acetaminophen   Tablet .. 650 milliGRAM(s) Oral every 6 hours  amLODIPine   Tablet 5 milliGRAM(s) Oral daily  chlorhexidine 2% Cloths 1 Application(s) Topical daily  docusate sodium 100 milliGRAM(s) Oral daily  folic acid 1 milliGRAM(s) Oral daily  furosemide    Tablet 20 milliGRAM(s) Oral daily  metoprolol succinate ER 25 milliGRAM(s) Oral daily  pantoprazole    Tablet 40 milliGRAM(s) Oral two times a day  polyethylene glycol 3350 17 Gram(s) Oral two times a day  senna 2 Tablet(s) Oral two times a day    MEDICATIONS  (PRN):  acetaminophen    Suspension .. 650 milliGRAM(s) Oral every 4 hours PRN Temp greater or equal to 38C (100.4F), Moderate Pain (4 - 6)  bisacodyl Suppository 10 milliGRAM(s) Rectal daily PRN Constipation  morphine   Solution 2.5 milliGRAM(s) Oral every 4 hours PRN moderate or severe pain  ondansetron Injectable 4 milliGRAM(s) IV Push every 6 hours PRN Nausea and/or Vomiting  simethicone drops 80 milliGRAM(s) Oral two times a day PRN Gas      Allergies  penicillin (Unknown)      Review of Systems:  General:  No fevers or chills    ENT:  denies any nausea or vomiting , denies dysphagia  CV:  No chest pain  Resp:  No cough or  wheezing  GI: see HPI    Vital Signs Last 24 Hrs  T(C): 36.7 (30 Oct 2019 09:30), Max: 36.8 (29 Oct 2019 23:50)  T(F): 98.1 (30 Oct 2019 09:30), Max: 98.2 (29 Oct 2019 23:50)  HR: 119 (30 Oct 2019 09:30) (92 - 119)  BP: 145/85 (30 Oct 2019 09:30) (145/67 - 151/81)  BP(mean): --  RR: 18 (30 Oct 2019 09:30) (18 - 18)  SpO2: 97% (30 Oct 2019 09:30) (97% - 97%)    PHYSICAL EXAM:  Constitutional: nontoxic in NAD, frail elderly WF appears comfortable  Neck:  supple  Respiratory: anterior chest wall clear to auscultation  Cardiovascular: S1 and S2  tachy +murmur  Gastrointestinal: soft rounded/distended NT no rebound or rigidity +BS  Extremities: No peripheral edema, or cyanosis  Neurological: A/O x 2 no focal asymmetry  Skin: No visible  rashes    LABS:                        9.6    17.73 )-----------( 582      ( 29 Oct 2019 10:18 )             30.9     10-29    136  |  99  |  15  ----------------------------<  142<H>  4.0   |  25  |  0.31<L>    Ca    10.4      29 Oct 2019 07:53      RADIOLOGY & ADDITIONAL TESTS:

## 2019-10-30 NOTE — PROGRESS NOTE ADULT - ATTENDING COMMENTS
11. Hypokalemia No further blood draws  12. Leukocytosis no obvious source or sign of infection.  Hold off on antibiotics at this timeNo further fluctuance  13. Right middle lobe mucoid impaction no treatment needed at this time  14. Urinary retention schneider dcd Voiding okay  15. HTN follow BP contin current meds    Gamal Martinez MD, FAAP  office [phone]  cell [phone]

## 2019-10-31 LAB
GLUCOSE BLDC GLUCOMTR-MCNC: 162 MG/DL — HIGH (ref 70–99)
GLUCOSE BLDC GLUCOMTR-MCNC: 195 MG/DL — HIGH (ref 70–99)

## 2019-10-31 PROCEDURE — 93010 ELECTROCARDIOGRAM REPORT: CPT

## 2019-10-31 PROCEDURE — 99232 SBSQ HOSP IP/OBS MODERATE 35: CPT

## 2019-10-31 RX ADMIN — SENNA PLUS 2 TABLET(S): 8.6 TABLET ORAL at 06:19

## 2019-10-31 RX ADMIN — Medication 100 MILLIGRAM(S): at 11:35

## 2019-10-31 RX ADMIN — PANTOPRAZOLE SODIUM 40 MILLIGRAM(S): 20 TABLET, DELAYED RELEASE ORAL at 17:00

## 2019-10-31 RX ADMIN — Medication 650 MILLIGRAM(S): at 06:19

## 2019-10-31 RX ADMIN — SENNA PLUS 2 TABLET(S): 8.6 TABLET ORAL at 17:01

## 2019-10-31 RX ADMIN — Medication 1 MILLIGRAM(S): at 11:44

## 2019-10-31 RX ADMIN — CHLORHEXIDINE GLUCONATE 1 APPLICATION(S): 213 SOLUTION TOPICAL at 11:44

## 2019-10-31 RX ADMIN — Medication 25 MILLIGRAM(S): at 06:19

## 2019-10-31 RX ADMIN — Medication 650 MILLIGRAM(S): at 17:00

## 2019-10-31 RX ADMIN — AMLODIPINE BESYLATE 5 MILLIGRAM(S): 2.5 TABLET ORAL at 06:19

## 2019-10-31 RX ADMIN — Medication 650 MILLIGRAM(S): at 23:33

## 2019-10-31 RX ADMIN — PANTOPRAZOLE SODIUM 40 MILLIGRAM(S): 20 TABLET, DELAYED RELEASE ORAL at 06:19

## 2019-10-31 RX ADMIN — Medication 650 MILLIGRAM(S): at 12:00

## 2019-10-31 RX ADMIN — Medication 650 MILLIGRAM(S): at 00:04

## 2019-10-31 RX ADMIN — POLYETHYLENE GLYCOL 3350 17 GRAM(S): 17 POWDER, FOR SOLUTION ORAL at 17:00

## 2019-10-31 RX ADMIN — Medication 650 MILLIGRAM(S): at 11:34

## 2019-10-31 RX ADMIN — Medication 20 MILLIGRAM(S): at 06:19

## 2019-10-31 RX ADMIN — Medication 650 MILLIGRAM(S): at 22:33

## 2019-10-31 NOTE — PROGRESS NOTE ADULT - SUBJECTIVE AND OBJECTIVE BOX
Subjective: co feels tired all the time Patient denies any pain at rest and states pain overall well controlled seen by palliative care and hospice care network    Exam:  Gen: NAD  Neck: no JVD  Chest: normal shape and expansion  Heart: RRR with 2/6 systolic murmur  Lungs: CTAB  Abdomen: Soft, Minimally distended and soft with mild diffuse abdominal tenderness no rebound tenderness, no guarding, no rigidity, no HSM, normoactive BS x 4  Ext: no CCE        Vital Signs Last 24 Hrs  T(C): 36.7 (31 Oct 2019 06:07), Max: 36.7 (30 Oct 2019 09:30)  T(F): 98.1 (31 Oct 2019 06:07), Max: 98.1 (30 Oct 2019 09:30)  HR: 116 (31 Oct 2019 06:07) (102 - 119)  BP: 128/75 (31 Oct 2019 06:07) (128/75 - 145/85)  BP(mean): --  RR: 18 (31 Oct 2019 06:07) (18 - 18)  SpO2: 98% (31 Oct 2019 06:07) (97% - 98%)    Daily     Daily     I&O's Detail    30 Oct 2019 07:01  -  31 Oct 2019 07:00  --------------------------------------------------------  IN:  Total IN: 0 mL    OUT:    Indwelling Catheter - Urethral: 400 mL  Total OUT: 400 mL    Total NET: -400 mL          Daily     CAPILLARY BLOOD GLUCOSE      POCT Blood Glucose.: 166 mg/dL (30 Oct 2019 18:08)  POCT Blood Glucose.: 157 mg/dL (30 Oct 2019 12:26)      MEDICATIONS  (STANDING):  acetaminophen   Tablet .. 650 milliGRAM(s) Oral every 6 hours  amLODIPine   Tablet 5 milliGRAM(s) Oral daily  chlorhexidine 2% Cloths 1 Application(s) Topical daily  docusate sodium 100 milliGRAM(s) Oral daily  folic acid 1 milliGRAM(s) Oral daily  furosemide    Tablet 20 milliGRAM(s) Oral daily  metoprolol succinate ER 25 milliGRAM(s) Oral daily  pantoprazole    Tablet 40 milliGRAM(s) Oral two times a day  polyethylene glycol 3350 17 Gram(s) Oral two times a day  senna 2 Tablet(s) Oral two times a day    MEDICATIONS  (PRN):  acetaminophen    Suspension .. 650 milliGRAM(s) Oral every 4 hours PRN Temp greater or equal to 38C (100.4F), Moderate Pain (4 - 6)  bisacodyl Suppository 10 milliGRAM(s) Rectal daily PRN Constipation  morphine   Solution 2.5 milliGRAM(s) Oral every 4 hours PRN moderate or severe pain  ondansetron Injectable 4 milliGRAM(s) IV Push every 6 hours PRN Nausea and/or Vomiting  simethicone drops 80 milliGRAM(s) Oral two times a day PRN Gas      Labs:                          9.6    17.73 )-----------( 582      ( 29 Oct 2019 10:18 )             30.9

## 2019-10-31 NOTE — PROGRESS NOTE ADULT - ATTENDING COMMENTS
11. Hypokalemia No further blood draws  12. Leukocytosis no obvious source or sign of infection.  Hold off on antibiotics at this time No further blood draws  13. Right middle lobe mucoid impaction no treatment needed at this time  14. Urinary retention schneider dcd Voiding okay  15. HTN follow BP contin current meds    Gamal Martinez MD, FAAP  office [phone]  cell [phone]

## 2019-11-01 LAB
ALBUMIN SERPL ELPH-MCNC: 1.9 G/DL — LOW (ref 3.3–5)
ALP SERPL-CCNC: 165 U/L — HIGH (ref 40–120)
ALT FLD-CCNC: 19 U/L — SIGNIFICANT CHANGE UP (ref 10–45)
ANION GAP SERPL CALC-SCNC: 8 MMOL/L — SIGNIFICANT CHANGE UP (ref 5–17)
AST SERPL-CCNC: 13 U/L — SIGNIFICANT CHANGE UP (ref 10–40)
BASOPHILS # BLD AUTO: 0.02 K/UL — SIGNIFICANT CHANGE UP (ref 0–0.2)
BASOPHILS NFR BLD AUTO: 0.1 % — SIGNIFICANT CHANGE UP (ref 0–2)
BILIRUB SERPL-MCNC: 0.2 MG/DL — SIGNIFICANT CHANGE UP (ref 0.2–1.2)
BUN SERPL-MCNC: 29 MG/DL — HIGH (ref 7–23)
CALCIUM SERPL-MCNC: 10.8 MG/DL — HIGH (ref 8.4–10.5)
CHLORIDE SERPL-SCNC: 98 MMOL/L — SIGNIFICANT CHANGE UP (ref 96–108)
CO2 SERPL-SCNC: 29 MMOL/L — SIGNIFICANT CHANGE UP (ref 22–31)
CREAT SERPL-MCNC: 0.39 MG/DL — LOW (ref 0.5–1.3)
EOSINOPHIL # BLD AUTO: 0.01 K/UL — SIGNIFICANT CHANGE UP (ref 0–0.5)
EOSINOPHIL NFR BLD AUTO: 0.1 % — SIGNIFICANT CHANGE UP (ref 0–6)
GLUCOSE BLDC GLUCOMTR-MCNC: 145 MG/DL — HIGH (ref 70–99)
GLUCOSE SERPL-MCNC: 196 MG/DL — HIGH (ref 70–99)
HCT VFR BLD CALC: 26.1 % — LOW (ref 34.5–45)
HGB BLD-MCNC: 8.4 G/DL — LOW (ref 11.5–15.5)
IMM GRANULOCYTES NFR BLD AUTO: 1.1 % — SIGNIFICANT CHANGE UP (ref 0–1.5)
LYMPHOCYTES # BLD AUTO: 1.27 K/UL — SIGNIFICANT CHANGE UP (ref 1–3.3)
LYMPHOCYTES # BLD AUTO: 6.7 % — LOW (ref 13–44)
MCHC RBC-ENTMCNC: 28.1 PG — SIGNIFICANT CHANGE UP (ref 27–34)
MCHC RBC-ENTMCNC: 32.2 GM/DL — SIGNIFICANT CHANGE UP (ref 32–36)
MCV RBC AUTO: 87.3 FL — SIGNIFICANT CHANGE UP (ref 80–100)
MONOCYTES # BLD AUTO: 0.97 K/UL — HIGH (ref 0–0.9)
MONOCYTES NFR BLD AUTO: 5.1 % — SIGNIFICANT CHANGE UP (ref 2–14)
NEUTROPHILS # BLD AUTO: 16.46 K/UL — HIGH (ref 1.8–7.4)
NEUTROPHILS NFR BLD AUTO: 86.9 % — HIGH (ref 43–77)
NRBC # BLD: 0 /100 WBCS — SIGNIFICANT CHANGE UP (ref 0–0)
PLATELET # BLD AUTO: 527 K/UL — HIGH (ref 150–400)
POTASSIUM SERPL-MCNC: 4 MMOL/L — SIGNIFICANT CHANGE UP (ref 3.5–5.3)
POTASSIUM SERPL-SCNC: 4 MMOL/L — SIGNIFICANT CHANGE UP (ref 3.5–5.3)
PROT SERPL-MCNC: 5.9 G/DL — LOW (ref 6–8.3)
RBC # BLD: 2.99 M/UL — LOW (ref 3.8–5.2)
RBC # FLD: 15 % — HIGH (ref 10.3–14.5)
SODIUM SERPL-SCNC: 135 MMOL/L — SIGNIFICANT CHANGE UP (ref 135–145)
WBC # BLD: 18.94 K/UL — HIGH (ref 3.8–10.5)
WBC # FLD AUTO: 18.94 K/UL — HIGH (ref 3.8–10.5)

## 2019-11-01 RX ADMIN — MORPHINE SULFATE 2.5 MILLIGRAM(S): 50 CAPSULE, EXTENDED RELEASE ORAL at 08:36

## 2019-11-01 RX ADMIN — Medication 650 MILLIGRAM(S): at 18:00

## 2019-11-01 RX ADMIN — Medication 650 MILLIGRAM(S): at 10:07

## 2019-11-01 RX ADMIN — Medication 1 MILLIGRAM(S): at 12:30

## 2019-11-01 RX ADMIN — PANTOPRAZOLE SODIUM 40 MILLIGRAM(S): 20 TABLET, DELAYED RELEASE ORAL at 17:27

## 2019-11-01 RX ADMIN — Medication 650 MILLIGRAM(S): at 09:27

## 2019-11-01 RX ADMIN — Medication 650 MILLIGRAM(S): at 17:30

## 2019-11-01 RX ADMIN — Medication 25 MILLIGRAM(S): at 08:43

## 2019-11-01 RX ADMIN — Medication 650 MILLIGRAM(S): at 17:28

## 2019-11-01 RX ADMIN — CHLORHEXIDINE GLUCONATE 1 APPLICATION(S): 213 SOLUTION TOPICAL at 12:31

## 2019-11-01 RX ADMIN — PANTOPRAZOLE SODIUM 40 MILLIGRAM(S): 20 TABLET, DELAYED RELEASE ORAL at 08:43

## 2019-11-01 RX ADMIN — SENNA PLUS 2 TABLET(S): 8.6 TABLET ORAL at 17:27

## 2019-11-01 RX ADMIN — Medication 650 MILLIGRAM(S): at 03:36

## 2019-11-01 RX ADMIN — MORPHINE SULFATE 2.5 MILLIGRAM(S): 50 CAPSULE, EXTENDED RELEASE ORAL at 09:06

## 2019-11-01 RX ADMIN — Medication 100 MILLIGRAM(S): at 12:30

## 2019-11-01 RX ADMIN — Medication 650 MILLIGRAM(S): at 04:37

## 2019-11-01 RX ADMIN — POLYETHYLENE GLYCOL 3350 17 GRAM(S): 17 POWDER, FOR SOLUTION ORAL at 17:28

## 2019-11-01 NOTE — PROGRESS NOTE ADULT - ATTENDING COMMENTS
11. Hypokalemia resolved  12. Leukocytosis no obvious source or sign of infection.  Hold off on antibiotics at this time No further blood draws  13. Right middle lobe mucoid impaction no treatment needed at this time  14. Urinary retention cshneider dcd Voiding okay  15. HTN follow BP contin current meds    Gamal Martinez MD, FAAP  office [phone]  cell [phone] 11. Hypokalemia resolved  12. Leukocytosis no obvious source or sign of infection.  Hold off on antibiotics at this time No further blood draws  13. Right middle lobe mucoid impaction no treatment needed at this time  14. Urinary retention schneider dcd Voiding okay  15. HTN DC amlodipine and lasix    Gamal Martinez MD, FAAP  office [phone]  cell [phone]

## 2019-11-01 NOTE — PROGRESS NOTE ADULT - SUBJECTIVE AND OBJECTIVE BOX
Subjective: co feels tired all the time Patient denies any pain at rest and states pain overall well controlled seen by palliative care and hospice care network    Exam:  Gen: NAD  Neck: no JVD  Chest: normal shape and expansion  Heart: RRR with 2/6 systolic murmur  Lungs: CTAB  Abdomen: Soft, Minimally distended and soft with mild diffuse abdominal tenderness no rebound tenderness, no guarding, no rigidity, no HSM, normoactive BS x 4  Ext: no CCE        Vital Signs Last 24 Hrs  T(C): 36.4 (01 Nov 2019 04:17), Max: 36.9 (01 Nov 2019 00:00)  T(F): 97.6 (01 Nov 2019 04:17), Max: 98.4 (01 Nov 2019 00:00)  HR: 98 (01 Nov 2019 04:17) (94 - 111)  BP: 116/72 (01 Nov 2019 04:17) (116/72 - 135/70)  BP(mean): --  RR: 18 (01 Nov 2019 04:17) (18 - 18)  SpO2: 98% (01 Nov 2019 04:17) (95% - 98%)    Daily     Daily     I&O's Detail    31 Oct 2019 07:01  -  01 Nov 2019 07:00  --------------------------------------------------------  IN:    Oral Fluid: 230 mL  Total IN: 230 mL    OUT:    Indwelling Catheter - Urethral: 1000 mL  Total OUT: 1000 mL    Total NET: -770 mL          Daily     CAPILLARY BLOOD GLUCOSE      POCT Blood Glucose.: 195 mg/dL (31 Oct 2019 12:34)  POCT Blood Glucose.: 162 mg/dL (31 Oct 2019 09:54)      MEDICATIONS  (STANDING):  acetaminophen   Tablet .. 650 milliGRAM(s) Oral every 6 hours  amLODIPine   Tablet 5 milliGRAM(s) Oral daily  chlorhexidine 2% Cloths 1 Application(s) Topical daily  docusate sodium 100 milliGRAM(s) Oral daily  folic acid 1 milliGRAM(s) Oral daily  furosemide    Tablet 20 milliGRAM(s) Oral daily  metoprolol succinate ER 25 milliGRAM(s) Oral daily  pantoprazole    Tablet 40 milliGRAM(s) Oral two times a day  polyethylene glycol 3350 17 Gram(s) Oral two times a day  senna 2 Tablet(s) Oral two times a day    MEDICATIONS  (PRN):  acetaminophen    Suspension .. 650 milliGRAM(s) Oral every 4 hours PRN Temp greater or equal to 38C (100.4F), Moderate Pain (4 - 6)  bisacodyl Suppository 10 milliGRAM(s) Rectal daily PRN Constipation  morphine   Solution 2.5 milliGRAM(s) Oral every 4 hours PRN moderate or severe pain  ondansetron Injectable 4 milliGRAM(s) IV Push every 6 hours PRN Nausea and/or Vomiting  simethicone drops 80 milliGRAM(s) Oral two times a day PRN Gas      Labs: Subjective: co some pain in left lower rib area asking for morphine has not received any yet. seen by palliative care and hospice care network awaiting arrangements from hospice care network    Exam:  Gen: NAD  Neck: no JVD  Chest: normal shape and expansion  Heart: RRR with 2/6 systolic murmur  Lungs: CTAB  Abdomen: Soft, Minimally distended and soft with mild diffuse abdominal tenderness no rebound tenderness, no guarding, no rigidity, no HSM, normoactive BS x 4  Ext: no CCE        Vital Signs Last 24 Hrs  T(C): 36.4 (01 Nov 2019 04:17), Max: 36.9 (01 Nov 2019 00:00)  T(F): 97.6 (01 Nov 2019 04:17), Max: 98.4 (01 Nov 2019 00:00)  HR: 98 (01 Nov 2019 04:17) (94 - 111)  BP: 116/72 (01 Nov 2019 04:17) (116/72 - 135/70)  BP(mean): --  RR: 18 (01 Nov 2019 04:17) (18 - 18)  SpO2: 98% (01 Nov 2019 04:17) (95% - 98%)    Daily     Daily     I&O's Detail    31 Oct 2019 07:01  -  01 Nov 2019 07:00  --------------------------------------------------------  IN:    Oral Fluid: 230 mL  Total IN: 230 mL    OUT:    Indwelling Catheter - Urethral: 1000 mL  Total OUT: 1000 mL    Total NET: -770 mL          Daily     CAPILLARY BLOOD GLUCOSE      POCT Blood Glucose.: 195 mg/dL (31 Oct 2019 12:34)  POCT Blood Glucose.: 162 mg/dL (31 Oct 2019 09:54)      MEDICATIONS  (STANDING):  acetaminophen   Tablet .. 650 milliGRAM(s) Oral every 6 hours  amLODIPine   Tablet 5 milliGRAM(s) Oral daily  chlorhexidine 2% Cloths 1 Application(s) Topical daily  docusate sodium 100 milliGRAM(s) Oral daily  folic acid 1 milliGRAM(s) Oral daily  furosemide    Tablet 20 milliGRAM(s) Oral daily  metoprolol succinate ER 25 milliGRAM(s) Oral daily  pantoprazole    Tablet 40 milliGRAM(s) Oral two times a day  polyethylene glycol 3350 17 Gram(s) Oral two times a day  senna 2 Tablet(s) Oral two times a day    MEDICATIONS  (PRN):  acetaminophen    Suspension .. 650 milliGRAM(s) Oral every 4 hours PRN Temp greater or equal to 38C (100.4F), Moderate Pain (4 - 6)  bisacodyl Suppository 10 milliGRAM(s) Rectal daily PRN Constipation  morphine   Solution 2.5 milliGRAM(s) Oral every 4 hours PRN moderate or severe pain  ondansetron Injectable 4 milliGRAM(s) IV Push every 6 hours PRN Nausea and/or Vomiting  simethicone drops 80 milliGRAM(s) Oral two times a day PRN Gas      Labs:

## 2019-11-01 NOTE — CHART NOTE - NSCHARTNOTEFT_GEN_A_CORE
Spoke with health care proxy, who requests CBC and metabolic panel for evaluation of anemia and renal function prior to discharge to help assess patient's current status and prognosis

## 2019-11-02 LAB
GLUCOSE BLDC GLUCOMTR-MCNC: 152 MG/DL — HIGH (ref 70–99)
GLUCOSE BLDC GLUCOMTR-MCNC: 189 MG/DL — HIGH (ref 70–99)

## 2019-11-02 RX ADMIN — PANTOPRAZOLE SODIUM 40 MILLIGRAM(S): 20 TABLET, DELAYED RELEASE ORAL at 18:04

## 2019-11-02 RX ADMIN — Medication 650 MILLIGRAM(S): at 16:50

## 2019-11-02 RX ADMIN — Medication 1 MILLIGRAM(S): at 11:26

## 2019-11-02 RX ADMIN — POLYETHYLENE GLYCOL 3350 17 GRAM(S): 17 POWDER, FOR SOLUTION ORAL at 06:34

## 2019-11-02 RX ADMIN — Medication 650 MILLIGRAM(S): at 17:15

## 2019-11-02 RX ADMIN — Medication 650 MILLIGRAM(S): at 22:40

## 2019-11-02 RX ADMIN — SENNA PLUS 2 TABLET(S): 8.6 TABLET ORAL at 06:48

## 2019-11-02 RX ADMIN — MORPHINE SULFATE 2.5 MILLIGRAM(S): 50 CAPSULE, EXTENDED RELEASE ORAL at 01:59

## 2019-11-02 RX ADMIN — SENNA PLUS 2 TABLET(S): 8.6 TABLET ORAL at 18:05

## 2019-11-02 RX ADMIN — PANTOPRAZOLE SODIUM 40 MILLIGRAM(S): 20 TABLET, DELAYED RELEASE ORAL at 06:34

## 2019-11-02 RX ADMIN — MORPHINE SULFATE 2.5 MILLIGRAM(S): 50 CAPSULE, EXTENDED RELEASE ORAL at 00:59

## 2019-11-02 RX ADMIN — POLYETHYLENE GLYCOL 3350 17 GRAM(S): 17 POWDER, FOR SOLUTION ORAL at 18:05

## 2019-11-02 RX ADMIN — CHLORHEXIDINE GLUCONATE 1 APPLICATION(S): 213 SOLUTION TOPICAL at 11:34

## 2019-11-02 RX ADMIN — Medication 25 MILLIGRAM(S): at 06:34

## 2019-11-02 RX ADMIN — Medication 650 MILLIGRAM(S): at 00:58

## 2019-11-02 RX ADMIN — Medication 650 MILLIGRAM(S): at 22:10

## 2019-11-02 NOTE — PROGRESS NOTE ADULT - SUBJECTIVE AND OBJECTIVE BOX
Subjective: co some pain in left lower rib area asking for morphine has not received any yet. seen by palliative care and hospice care network awaiting arrangements from hospice care network    Exam:  Gen: NAD  Neck: no JVD  Chest: normal shape and expansion  Heart: RRR with 2/6 systolic murmur  Lungs: CTAB  Abdomen: Soft, Minimally distended and soft with mild diffuse abdominal tenderness no rebound tenderness, no guarding, no rigidity, no HSM, normoactive BS x 4  Ext: no CCE        ICU Vital Signs Last 24 Hrs  T(C): 36.9 (02 Nov 2019 07:29), Max: 37.1 (01 Nov 2019 16:22)  T(F): 98.5 (02 Nov 2019 07:29), Max: 98.7 (01 Nov 2019 16:22)  HR: 105 (02 Nov 2019 07:29) (100 - 105)  BP: 148/81 (02 Nov 2019 07:29) (137/68 - 155/74)  BP(mean): --  ABP: --  ABP(mean): --  RR: 18 (02 Nov 2019 07:29) (16 - 18)  SpO2: 97% (02 Nov 2019 07:29) (95% - 97%)    CAPILLARY BLOOD GLUCOSE  POCT Blood Glucose.: 152 mg/dL (02 Nov 2019 08:31)      MEDICATIONS  (STANDING):  acetaminophen   Tablet .. 650 milliGRAM(s) Oral every 6 hours  chlorhexidine 2% Cloths 1 Application(s) Topical daily  docusate sodium 100 milliGRAM(s) Oral daily  folic acid 1 milliGRAM(s) Oral daily  metoprolol succinate ER 25 milliGRAM(s) Oral daily  pantoprazole    Tablet 40 milliGRAM(s) Oral two times a day  polyethylene glycol 3350 17 Gram(s) Oral two times a day  senna 2 Tablet(s) Oral two times a day    MEDICATIONS  (PRN):  acetaminophen    Suspension .. 650 milliGRAM(s) Oral every 4 hours PRN Temp greater or equal to 38C (100.4F), Moderate Pain (4 - 6)  bisacodyl Suppository 10 milliGRAM(s) Rectal daily PRN Constipation  morphine   Solution 2.5 milliGRAM(s) Oral every 4 hours PRN moderate or severe pain  ondansetron Injectable 4 milliGRAM(s) IV Push every 6 hours PRN Nausea and/or Vomiting  simethicone drops 80 milliGRAM(s) Oral two times a day PRN Gas      Labs:                      8.4    18.94 )-----------( 527      ( 01 Nov 2019 12:00 )             26.1   11-01    135  |  98  |  29<H>  ----------------------------<  196<H>  4.0   |  29  |  0.39<L>    Ca    10.8<H>      01 Nov 2019 12:00    TPro  5.9<L>  /  Alb  1.9<L>  /  TBili  0.2  /  DBili  x   /  AST  13  /  ALT  19  /  AlkPhos  165<H>  11-01

## 2019-11-03 LAB
GLUCOSE BLDC GLUCOMTR-MCNC: 160 MG/DL — HIGH (ref 70–99)
GLUCOSE BLDC GLUCOMTR-MCNC: 161 MG/DL — HIGH (ref 70–99)
GLUCOSE BLDC GLUCOMTR-MCNC: 161 MG/DL — HIGH (ref 70–99)

## 2019-11-03 RX ADMIN — SENNA PLUS 2 TABLET(S): 8.6 TABLET ORAL at 17:10

## 2019-11-03 RX ADMIN — CHLORHEXIDINE GLUCONATE 1 APPLICATION(S): 213 SOLUTION TOPICAL at 12:02

## 2019-11-03 RX ADMIN — POLYETHYLENE GLYCOL 3350 17 GRAM(S): 17 POWDER, FOR SOLUTION ORAL at 17:10

## 2019-11-03 RX ADMIN — Medication 650 MILLIGRAM(S): at 22:08

## 2019-11-03 RX ADMIN — MORPHINE SULFATE 2.5 MILLIGRAM(S): 50 CAPSULE, EXTENDED RELEASE ORAL at 05:24

## 2019-11-03 RX ADMIN — MORPHINE SULFATE 2.5 MILLIGRAM(S): 50 CAPSULE, EXTENDED RELEASE ORAL at 00:44

## 2019-11-03 RX ADMIN — Medication 650 MILLIGRAM(S): at 14:34

## 2019-11-03 RX ADMIN — Medication 25 MILLIGRAM(S): at 05:05

## 2019-11-03 RX ADMIN — MORPHINE SULFATE 2.5 MILLIGRAM(S): 50 CAPSULE, EXTENDED RELEASE ORAL at 04:54

## 2019-11-03 RX ADMIN — Medication 650 MILLIGRAM(S): at 15:00

## 2019-11-03 RX ADMIN — Medication 1 MILLIGRAM(S): at 12:02

## 2019-11-03 RX ADMIN — SENNA PLUS 2 TABLET(S): 8.6 TABLET ORAL at 05:05

## 2019-11-03 RX ADMIN — POLYETHYLENE GLYCOL 3350 17 GRAM(S): 17 POWDER, FOR SOLUTION ORAL at 05:06

## 2019-11-03 RX ADMIN — Medication 650 MILLIGRAM(S): at 21:38

## 2019-11-03 RX ADMIN — MORPHINE SULFATE 2.5 MILLIGRAM(S): 50 CAPSULE, EXTENDED RELEASE ORAL at 01:44

## 2019-11-03 RX ADMIN — PANTOPRAZOLE SODIUM 40 MILLIGRAM(S): 20 TABLET, DELAYED RELEASE ORAL at 17:10

## 2019-11-03 RX ADMIN — PANTOPRAZOLE SODIUM 40 MILLIGRAM(S): 20 TABLET, DELAYED RELEASE ORAL at 05:05

## 2019-11-03 NOTE — CHART NOTE - NSCHARTNOTEFT_GEN_A_CORE
pt tachycardic and hemoglobin dropping family/HCP Dr. Primitivo Ch requesting 1 additional unit prior to discharge for pt comfort and to help allow for other family members to see her who are traveling to see her

## 2019-11-03 NOTE — PROGRESS NOTE ADULT - SUBJECTIVE AND OBJECTIVE BOX
weekend coverage for Dr. Martinez  Subjective: co some pain in left lower rib area asking for morphine has not received any yet. seen by palliative care and hospice care network awaiting arrangements from hospice care network    Exam:  Gen: NAD  Neck: no JVD  Chest: normal shape and expansion  Heart: RRR with 2/6 systolic murmur  Lungs: CTAB  Abdomen: Soft, Minimally distended and soft with mild diffuse abdominal tenderness no rebound tenderness, no guarding, no rigidity, no HSM, normoactive BS x 4  Ext: no CCE    ICU Vital Signs Last 24 Hrs  T(C): 36.9 (03 Nov 2019 00:34), Max: 36.9 (02 Nov 2019 20:35)  T(F): 98.5 (03 Nov 2019 00:34), Max: 98.5 (02 Nov 2019 20:35)  HR: 99 (03 Nov 2019 00:34) (99 - 103)  BP: 155/66 (03 Nov 2019 00:34) (129/70 - 155/66)  BP(mean): --  ABP: --  ABP(mean): --  RR: 18 (03 Nov 2019 00:34) (18 - 18)  SpO2: 96% (03 Nov 2019 00:34) (95% - 97%)    CAPILLARY BLOOD GLUCOSE  POCT Blood Glucose.: 189 mg/dL (02 Nov 2019 11:57)          MEDICATIONS  (STANDING):  acetaminophen   Tablet .. 650 milliGRAM(s) Oral every 6 hours  chlorhexidine 2% Cloths 1 Application(s) Topical daily  docusate sodium 100 milliGRAM(s) Oral daily  folic acid 1 milliGRAM(s) Oral daily  metoprolol succinate ER 25 milliGRAM(s) Oral daily  pantoprazole    Tablet 40 milliGRAM(s) Oral two times a day  polyethylene glycol 3350 17 Gram(s) Oral two times a day  senna 2 Tablet(s) Oral two times a day    MEDICATIONS  (PRN):  acetaminophen    Suspension .. 650 milliGRAM(s) Oral every 4 hours PRN Temp greater or equal to 38C (100.4F), Moderate Pain (4 - 6)  bisacodyl Suppository 10 milliGRAM(s) Rectal daily PRN Constipation  morphine   Solution 2.5 milliGRAM(s) Oral every 4 hours PRN moderate or severe pain  ondansetron Injectable 4 milliGRAM(s) IV Push every 6 hours PRN Nausea and/or Vomiting  simethicone drops 80 milliGRAM(s) Oral two times a day PRN Gas      Labs:                      8.4    18.94 )-----------( 527      ( 01 Nov 2019 12:00 )             26.1   11-01    135  |  98  |  29<H>  ----------------------------<  196<H>  4.0   |  29  |  0.39<L>    Ca    10.8<H>      01 Nov 2019 12:00    TPro  5.9<L>  /  Alb  1.9<L>  /  TBili  0.2  /  DBili  x   /  AST  13  /  ALT  19  /  AlkPhos  165<H>  11-01

## 2019-11-04 ENCOUNTER — TRANSCRIPTION ENCOUNTER (OUTPATIENT)
Age: 84
End: 2019-11-04

## 2019-11-04 VITALS
DIASTOLIC BLOOD PRESSURE: 87 MMHG | HEART RATE: 101 BPM | TEMPERATURE: 98 F | SYSTOLIC BLOOD PRESSURE: 176 MMHG | RESPIRATION RATE: 18 BRPM | OXYGEN SATURATION: 96 %

## 2019-11-04 DIAGNOSIS — C50.919 MALIGNANT NEOPLASM OF UNSPECIFIED SITE OF UNSPECIFIED FEMALE BREAST: ICD-10-CM

## 2019-11-04 LAB
BLD GP AB SCN SERPL QL: NEGATIVE — SIGNIFICANT CHANGE UP
RH IG SCN BLD-IMP: POSITIVE — SIGNIFICANT CHANGE UP

## 2019-11-04 RX ORDER — NORTRIPTYLINE HYDROCHLORIDE 10 MG/1
1 CAPSULE ORAL
Qty: 0 | Refills: 0 | DISCHARGE

## 2019-11-04 RX ORDER — AMLODIPINE BESYLATE 2.5 MG/1
1 TABLET ORAL
Qty: 0 | Refills: 0 | DISCHARGE

## 2019-11-04 RX ORDER — OLMESARTAN MEDOXOMIL-HYDROCHLOROTHIAZIDE 25; 40 MG/1; MG/1
1 TABLET, FILM COATED ORAL
Qty: 0 | Refills: 0 | DISCHARGE

## 2019-11-04 RX ORDER — MORPHINE SULFATE 50 MG/1
1.25 CAPSULE, EXTENDED RELEASE ORAL
Qty: 52.5 | Refills: 0
Start: 2019-11-04 | End: 2019-11-10

## 2019-11-04 RX ORDER — SIMVASTATIN 20 MG/1
1 TABLET, FILM COATED ORAL
Qty: 0 | Refills: 0 | DISCHARGE

## 2019-11-04 RX ORDER — POLYETHYLENE GLYCOL 3350 17 G/17G
17 POWDER, FOR SOLUTION ORAL
Qty: 0 | Refills: 0 | DISCHARGE
Start: 2019-11-04

## 2019-11-04 RX ORDER — FOLIC ACID 0.8 MG
1 TABLET ORAL
Qty: 30 | Refills: 0
Start: 2019-11-04 | End: 2019-12-03

## 2019-11-04 RX ORDER — DOCUSATE SODIUM 100 MG
1 CAPSULE ORAL
Qty: 0 | Refills: 0 | DISCHARGE

## 2019-11-04 RX ORDER — SENNA PLUS 8.6 MG/1
2 TABLET ORAL
Qty: 0 | Refills: 0 | DISCHARGE
Start: 2019-11-04

## 2019-11-04 RX ORDER — DIGOXIN 250 MCG
1 TABLET ORAL
Qty: 0 | Refills: 0 | DISCHARGE

## 2019-11-04 RX ORDER — SIMETHICONE 80 MG/1
1.2 TABLET, CHEWABLE ORAL
Qty: 33.6 | Refills: 0
Start: 2019-11-04 | End: 2019-11-17

## 2019-11-04 RX ADMIN — Medication 650 MILLIGRAM(S): at 14:14

## 2019-11-04 RX ADMIN — Medication 1 MILLIGRAM(S): at 11:51

## 2019-11-04 RX ADMIN — SENNA PLUS 2 TABLET(S): 8.6 TABLET ORAL at 05:42

## 2019-11-04 RX ADMIN — PANTOPRAZOLE SODIUM 40 MILLIGRAM(S): 20 TABLET, DELAYED RELEASE ORAL at 05:42

## 2019-11-04 RX ADMIN — Medication 25 MILLIGRAM(S): at 05:42

## 2019-11-04 RX ADMIN — Medication 650 MILLIGRAM(S): at 03:10

## 2019-11-04 RX ADMIN — Medication 650 MILLIGRAM(S): at 08:17

## 2019-11-04 RX ADMIN — PANTOPRAZOLE SODIUM 40 MILLIGRAM(S): 20 TABLET, DELAYED RELEASE ORAL at 17:07

## 2019-11-04 RX ADMIN — CHLORHEXIDINE GLUCONATE 1 APPLICATION(S): 213 SOLUTION TOPICAL at 11:51

## 2019-11-04 RX ADMIN — POLYETHYLENE GLYCOL 3350 17 GRAM(S): 17 POWDER, FOR SOLUTION ORAL at 05:42

## 2019-11-04 RX ADMIN — Medication 650 MILLIGRAM(S): at 02:43

## 2019-11-04 NOTE — PROGRESS NOTE ADULT - PROBLEM SELECTOR PLAN 2
stable hold asa
Hx GIB, anemia , did get prbc, no actvie bleed now  anemia 9.9/29.8
suspect due to delerium from being in the hospital varies depending on the day
Likely multifactorial has resolved.  Patient is alert and oriented x4 and is able to make her own medical decisions
Patient is DNR. Discussed at length again with patient's healthcare proxy last night possible home with hospice he will discuss with the rest of the family and with the patient to make decisions on planning
Patient is DNR. awaiting home hospice and 24 hr care arrangements. plan for dc to home today if arrangements are made
Patient is DNR. awaiting home hospice arrangements
Resolved
Resolved
due to sepsis less likely due to meds hold gabapentin for now
suspect due to delerium from being in the hospital varies depending on the day
suspect due to delerium from being in the hospital varies depending on the day
Hx, recent GIB
suspect due to delerium from being in the hospital
suspect due to delerium from being in the hospital varies depending on the day
suspect due to delerium from being in the hospital varies depending on the day
no further DMT per heme/onc notes  family meeting with [facility]
suspect due to delerium from being in the hospital varies depending on the day
Patient is DNR. Yan
stable hold asa
suspect due to delerium from being in the hospital varies depending on the day
suspect due to delerium from being in the hospital varies depending on the day

## 2019-11-04 NOTE — PROGRESS NOTE ADULT - PROBLEM SELECTOR PLAN 3
discontinue tx with triple tx per health care agents directive.
UTI, on abx , cont ,  has schneider - will need to come out
follow Hemoglobin and hematocrit drifting down a little todays result pending
H&H is down Repeated and was lower will repeat it again and check type and screen and transfuse make sure to draw Not from arm getting IV fluidsNo bowel movement so low suspicion for her active GI bleed to cause not degree of Hemoglobin and hematocrit
H&H is down Repeated and was lower will repeat it again and check type and screen and transfuse make sure to draw Not from arm getting IV fluidsNo bowel movement so low suspicion for her active GI bleed to cause not degree of Hemoglobin and hematocrit
Hemoglobin down at 7.6 yesterday will transfuse 1 unit prbc
Hemoglobin stable at 8.1.  Consider palliative transfusion.  Check type and screen with blood drawn the morning
due to gastritis with h pylori infection in setting of chronic nsaid use continues to bleed despite bid ppi and withdrawal of nsaid Discussed with GI a hold off on H. pylori treatment at this time and hold off on endoscopic evaluation given other medical problems.  Follow H&H
due to gastritis with h pylori infection in setting of chronic nsaid use continues to bleed despite bid ppi and withdrawal of nsaid will dw gi further re egd/colo to evaluate pending improvement of urosepsis
stable off abx
Urosepsis, on abx
Hemoglobin down at 7.6 yesterday will transfuse 1 unit prbc
follow Hemoglobin and hematocrit
follow Hemoglobin and hematocrit drifting down a little transfuse tomorrow if lower
requires assistance  family will need to hire additional help in home
follow Hemoglobin and hematocrit drifting down a little
stable off abx
start tx with triple tx
follow Hemoglobin and hematocrit drifting down a little await repeat cbc transfuse if continues to trend down
Hemoglobin stable at 8.1.  Consider palliative transfusion.  Check type and screen with blood draw this morning

## 2019-11-04 NOTE — PROGRESS NOTE ADULT - PROBLEM SELECTOR PROBLEM 3
H pylori ulcer
Anemia, unspecified type
Sepsis
Anemia, unspecified type
Sepsis
Anemia, unspecified type
Debility
Anemia, unspecified type
Sepsis
H pylori ulcer
Anemia, unspecified type
Anemia, unspecified type

## 2019-11-04 NOTE — PROGRESS NOTE ADULT - PROBLEM SELECTOR PLAN 1
dcd ceftriaxone per ID for now
Due to urinary tract infection with multiple organisms on urine culture will have Lab speciate Consult ID Continue Levaquin and Flagyl for now
Improved antibiotics switched to cefepime per ID recommendation patient tolerating cefepime
Improved antibiotics switched to cefepime per ID recommendation patient tolerating cefepime
Patient lytic lesions And indeterminate lesions in the liver, history of breast cancer ER/[place] positive, HER-2/donis negative decision was made not to do medication or radiation CA 27-29 was 15.6 on May 7, 2019 and 22.3 on September 17, 2019 with normal range less than 38.7. as per discussion with heme onc yesterday given pts overall deteriorating condition plan for home with hospice most appropriate at this time without hormone therapy or radiation therapy. Patient seen by the palliative care team and hospice care network and working on arrangements for home hospice. discharge when all arrangements have been made.  tylenol atc. morphine prn. pt has not received any morphine per nurse as per sons request. pt requesting pain meds instructed nurse to give prn morphine now
Patient lytic lesions And indeterminate lesions in the liver, history of breast cancer ER/[place] positive, HER-2/donis negative decision was made not to do medication or radiation CA 27-29 was 15.6 on May 7, 2019 and 22.3 on September 17, 2019 with normal range less than 38.7. as per discussion with heme onc yesterday given pts overall deteriorating condition plan for home with hospice most appropriate at this time without hormone therapy or radiation therapy. Patient seen by the palliative care team and hospice care network and working on arrangements for home hospice. discharge when all arrangements have been made.  tylenol atc. morphine prn. plan for discharge to home today with hospice care and 24 hr aid today if arrangements have been made
Patient lytic lesions And indeterminate lesions in the liver, history of breast cancer ER/[place] positive, HER-2/donis negative decision was made not to do medication or radiation CA 27-29 was 15.6 on May 7, 2019 and 22.3 on September 17, 2019 with normal range less than 38.7. as per discussion with heme onc yesterday given pts overall deteriorating condition plan for home with hospice most appropriate at this time without hormone therapy or radiation therapy. Patient seen by the palliative care team and hospice care now for and working on arrangements for home hospice. discharge when all arrangements have been made
Patient lytic lesions And indeterminate lesions in the liver, history of breast cancer ER/[place] positive, HER-2/donis negative decision was made not to do medication or radiation CA 27-29 was 15.6 on May 7, 2019 and 22.3 on September 17, 2019 with normal range less than 38.7. as per discussion with heme onc yesterday given pts overall deteriorating condition plan for home with hospice most appropriate at this time without hormone therapy or radiation therapy. Patient seen by the palliative care team and hospice care network and working on arrangements for home hospice. discharge when all arrangements have been made.  tylenol atc. morphine prn. pt has not received any morphine per nurse as per sons request. pt requesting pain meds instructed nurse to give prn morphine now
Patient lytic lesions And indeterminate lesions in the liver, history of breast cancer ER/[place] positive, HER-2/donis negative decision was made not to do medication or radiation CA 27-29 was 15.6 on May 7, 2019 and 22.3 on September 17, 2019 with normal range less than 38.7. as per discussion with heme onc yesterday given pts overall deteriorating condition plan for home with hospice most appropriate at this time without hormone therapy or radiation therapy
Patient lytic lesions And indeterminate lesions in the liver, history of breast cancer ER/[place] positive, HER-2/donis negative decision was made not to do medication or radiation CA 27-29 was 15.6 on May 7, 2019 and 22.3 on September 17, 2019 with normal range less than 38.7. as per discussion with heme onc yesterday given pt's overall deteriorating condition plan for home with hospice most appropriate at this time without hormone therapy or radiation therapy. Patient seen by the palliative care team and hospice care network and working on arrangements for home hospice. discharge when all arrangements have been made.  tylenol atc. morphine prn. pt has not received any morphine per nurse as per sons request. pt requesting pain meds instructed nurse to give prn morphine now
altered mental status due to uti UA and urine cx negative on admission contin levaquin pending culture results
continue ceftriaxone per ID for now
stable off abx await repeat cbc
stable off abx await repeat cbc
"sensitive" no co pain. no bm 3 days> order lax  needs to be oob  disc c RN
Improved antibiotics switched to ceftriaxone per ID recommendation patient tolerating well
stable off abx
continue ATC tylenol  prn morphine PO for breakthrough pain
Patient lytic lesions And indeterminate lesions in the liver, history of breast cancer ER/[place] positive, HER-2/donis negative decision was made not to do medication or radiation CA 27-29 was 15.6 on May 7, 2019 and 22.3 on September 17, 2019 with normal range less than 38.7. as per discussion with heme onc yesterday given pts overall deteriorating condition plan for home with hospice most appropriate at this time without hormone therapy or radiation therapy. Patient seen by the palliative care team and hospice care now for and working on arrangements for home hospice
transfuse 1 more unit follow cbc dw gi consider colo
stable off abx await repeat cbc
stable off abx await repeat cbc
fu xray, fu re po
dcd ceftriaxone per ID for now
s/p 2 units packed cells. Hemoglobin stable today.

## 2019-11-04 NOTE — PROGRESS NOTE ADULT - NSHPATTENDINGPLANDISCUSS_GEN_ALL_CORE
ADRIANNA Salomon
PATRICIA Walton
PATRICIA Walton
Hematology oncology palliative care physician and healthcare proxy
Dr DANIELSON, pt, XR techs
RN, Pt
HCP NP RN
HCP NP RN
dr kristi mckenzie
dr mckenzie
usama mckenzie

## 2019-11-04 NOTE — PROGRESS NOTE ADULT - PROBLEM SELECTOR PLAN 6
hold anticoag and dig
hold anticoag and dig
stable
hold anticoag and dig
no ac 2/2 gib. NSR now
diet controlled
diet controlled dc accucheck
hold anticoag and dig
hypovolemicgiven ns bolus and maint ivf follow labs
no anticoag 2/2 gib
hold anticoag and dig
diet controlled
stable

## 2019-11-04 NOTE — DISCHARGE NOTE PROVIDER - NSDCMRMEDTOKEN_GEN_ALL_CORE_FT
bisacodyl 10 mg rectal suppository: 1 suppository(ies) rectal once a day, As needed, Constipation  folic acid 1 mg oral tablet: 1 tab(s) orally once a day  metFORMIN 500 mg oral tablet: 1 tab(s) orally 2 times a day  metoprolol succinate 25 mg oral tablet, extended release: 1 tab(s) orally once a day  morphine 10 mg/5 mL oral solution: 1.25 milliliter(s) orally every 4 hours, As needed, moderate or severe pain MDD:7.5ml daily as needed  pantoprazole 40 mg oral delayed release tablet: 1 tab(s) orally 2 times a day  polyethylene glycol 3350 oral powder for reconstitution: 17 gram(s) orally 2 times a day  senna oral tablet: 2 tab(s) orally 2 times a day  simethicone 40 mg/0.6 mL oral liquid: 1.2 milliliter(s) orally 2 times a day, As needed, Gas  tlso brace:   Tylenol 325 mg oral tablet: 2 tab(s) orally every 4 hours, As Needed max 8 per day

## 2019-11-04 NOTE — PROGRESS NOTE ADULT - ATTENDING COMMENTS
11. Hypokalemia resolved  12. Leukocytosis no obvious source or sign of infection.  Hold off on antibiotics at this time No further blood draws  13. Right middle lobe mucoid impaction no treatment needed at this time  14. Urinary retention schneider dcd Voiding okay  15. HTN no tx needed contin beta blocker    Gamal Martinez MD, FAAP  office [phone]  cell [phone]

## 2019-11-04 NOTE — PROGRESS NOTE ADULT - PROBLEM SELECTOR PROBLEM 4
Essential hypertension
Diabetes mellitus
Hyponatremia
Altered mental status
Diabetes mellitus
Gastrointestinal bleeding
Hyponatremia
Diabetes mellitus
Advance care planning
Diabetes mellitus
Altered mental status
Essential hypertension
Diabetes mellitus
Diabetes mellitus

## 2019-11-04 NOTE — PROGRESS NOTE ADULT - PROBLEM SELECTOR PLAN 8
intermittent pneumatic compression stockings
intermittent pneumatic compression stockings
no pharmacologic prophylaxis
compression stockings
mets to bone, natasha in progress, BMBx MONday
compression stockings
contin synthroid
hold anticoag and dig
intermittent pneumatic compression stockings
intermittent pneumatic compression stockings
natasha in progress Dr ERUM uribe tomorrow
compression stockings
intermittent pneumatic compression stockings
intermittent pnumatic compression stockings
compression stockings
hold anticoag and dig
no pharmacologic prophylaxis

## 2019-11-04 NOTE — PROGRESS NOTE ADULT - SUBJECTIVE AND OBJECTIVE BOX
Subjective: co some pain in left lower rib area asking for morphine has not received any yet. seen by palliative care and hospice care network awaiting arrangements from hospice care network    Exam:  Gen: NAD  Neck: no JVD  Chest: normal shape and expansion  Heart: RRR with 2/6 systolic murmur  Lungs: CTAB  Abdomen: Soft, Minimally distended and soft with mild diffuse abdominal tenderness no rebound tenderness, no guarding, no rigidity, no HSM, normoactive BS x 4  Ext: no CCE        Vital Signs Last 24 Hrs  T(C): 36.5 (04 Nov 2019 07:57), Max: 37.1 (03 Nov 2019 20:43)  T(F): 97.7 (04 Nov 2019 07:57), Max: 98.8 (03 Nov 2019 20:43)  HR: 104 (04 Nov 2019 07:57) (101 - 110)  BP: 169/81 (04 Nov 2019 07:57) (132/76 - 185/83)  BP(mean): --  RR: 18 (04 Nov 2019 07:57) (18 - 18)  SpO2: 97% (04 Nov 2019 07:57) (95% - 97%)    Daily     Daily     I&O's Detail    03 Nov 2019 07:01  -  04 Nov 2019 07:00  --------------------------------------------------------  IN:    IV PiggyBack: 100 mL    Packed Red Blood Cells: 300 mL    Solution: 200 mL  Total IN: 600 mL    OUT:    Indwelling Catheter - Urethral: 900 mL  Total OUT: 900 mL    Total NET: -300 mL          Daily     CAPILLARY BLOOD GLUCOSE      POCT Blood Glucose.: 161 mg/dL (03 Nov 2019 20:56)  POCT Blood Glucose.: 160 mg/dL (03 Nov 2019 14:21)  POCT Blood Glucose.: 161 mg/dL (03 Nov 2019 11:51)      MEDICATIONS  (STANDING):  acetaminophen   Tablet .. 650 milliGRAM(s) Oral every 6 hours  chlorhexidine 2% Cloths 1 Application(s) Topical daily  folic acid 1 milliGRAM(s) Oral daily  metoprolol succinate ER 25 milliGRAM(s) Oral daily  pantoprazole    Tablet 40 milliGRAM(s) Oral two times a day  polyethylene glycol 3350 17 Gram(s) Oral two times a day  senna 2 Tablet(s) Oral two times a day    MEDICATIONS  (PRN):  acetaminophen    Suspension .. 650 milliGRAM(s) Oral every 4 hours PRN Temp greater or equal to 38C (100.4F), Moderate Pain (4 - 6)  bisacodyl Suppository 10 milliGRAM(s) Rectal daily PRN Constipation  morphine   Solution 2.5 milliGRAM(s) Oral every 4 hours PRN moderate or severe pain  ondansetron Injectable 4 milliGRAM(s) IV Push every 6 hours PRN Nausea and/or Vomiting  simethicone drops 80 milliGRAM(s) Oral two times a day PRN Gas      Labs:                          8.4    18.94 )-----------( 527      ( 01 Nov 2019 12:00 )             26.1   baso 0.1    eos 0.1    imm gran 1.1    lymph 6.7    mono 5.1    poly 86.9           11-01 @ 12:00    135  |  98  |  29<H>  ----------------------------<  196<H>  4.0   |  29  |  0.39<L>      Ca    10.8<H>      11-01 @ 12:00    TPro  5.9<L>  /  Alb  1.9<L>  /  TBili  0.2  /  DBili  x   /  AST  13  /  ALT  19  /  AlkPhos  165<H>  11-01 @ 12:00

## 2019-11-04 NOTE — PROGRESS NOTE ADULT - PROBLEM SELECTOR PLAN 10
intermittent pneumatic compression stockings
Patient is DNR may consider KB vs home with hospice
Patient is DNR may consider DONAVON vs home with hospice pt eval for donavon
Patient is DNR may consider DONAVON vs home with hospice pt eval for donavon
Discussed at great length with patient, partly in presence of a family member Ghanshyam Ch.  patient's son Gerardo Gerard was patient's healthcare proxy, however he was refusing some treatments and imaging when the patient was confused and not able to make her own decisions Patient states that her son believes in various alternative medicine treatments and his beliefs are not in alignment with her beliefs and she prefers to have different health care proxy. she now chooses her relative Primitivo Zendejas To be her healthcare proxy with his son, Ghanshyam Zendejas who is an intern at [employer] to be the alternative healthcare proxy and removing her son as healthcare proxy, healthcare proxy form was completed and witnessed by myself and Ghanshyam Zendejas and 2 nurses on the floor and placed in chart.  decision was made by the patient not to notify her son of the change at this time.  Also discussed do not resuscitate status and patient wishes to be DNR and this discussion was made in front of her family member, Ghanshyam Zendejas. Patient was made aware of the lytic lesions on the CAT scan that are suspicious for malignancy discussion was made to do further investigation before deciding on treatment plan and options, may consider some Treatment depending on workup results and Risks and benefits of treatment.
Patient is DNR continue workup and possible treatment depending on results of workup May eventually need hospice but need to finish workup to make decisions on plan of care
Patient is DNR continue workup and possible treatment depending on results of workup May eventually need hospice but need to finish workup to make decisions on plan of care
Patient is DNR may consider DONAVON vs home with hospice pt eval for donavon
Patient is DNR may consider DONAVON vs home with hospice pt eval for donavon
Patient is DNR may consider KB vs home with hospice
compression stockings
intermittent pneumatic compression stockings
Patient is DNR Discussed at length with patient's healthcare proxy last night possible home with hospice we will discuss with the rest of the family and with the patient.  I spoke with the patient at bedside and she will consider states she needs to think about it.  Patient's likelihood of having meaningful recovery with rehabilitation is low given her degree of Debility.  With poor by mouth intake.  Suspect that patient with not survive long and benefit from palliative radiation Or hormone therapy would be unlikely to get significant improvement and likely would best be suited to go home with comfort measures only.  Discussed with oncology who agreed with the above
Patient is DNR continue workup and possible treatment depending on results of workup May eventually need hospice but need to finish workup to make decisions on plan of care
Patient is DNR may consider DONAVON vs home with hospice pt eval for donavon
start Levaquin 500mg iv q 24 hours, follow up urine culture

## 2019-11-04 NOTE — PROGRESS NOTE ADULT - PROBLEM SELECTOR PROBLEM 7
Postherpetic neuralgia
Postherpetic neuralgia
Hypothyroidism
Postherpetic neuralgia
Type 2 diabetes mellitus with other specified complication, without long-term current use of insulin
Hyponatremia
Paroxysmal atrial fibrillation
Postherpetic neuralgia
Diabetes mellitus
Postherpetic neuralgia
Hyponatremia
Hypothyroidism

## 2019-11-04 NOTE — PROGRESS NOTE ADULT - PROBLEM SELECTOR PROBLEM 2
Altered mental status
Advance care planning
Altered mental status
Gastrointestinal bleeding
Altered mental status
Altered mental status
Malignant neoplasm of breast in female, estrogen receptor positive, unspecified laterality, unspecified site of breast
Altered mental status
Advance care planning
Coronary artery disease involving native coronary artery of native heart without angina pectoris
Altered mental status
Altered mental status
Gastrointestinal bleeding
Altered mental status
Coronary artery disease involving native coronary artery of native heart without angina pectoris

## 2019-11-04 NOTE — PROGRESS NOTE ADULT - REASON FOR ADMISSION
Weakness and anemia

## 2019-11-04 NOTE — PROGRESS NOTE ADULT - PROBLEM SELECTOR PROBLEM 9
Abnormal finding on diagnostic imaging of other region of abdomen or pelvis
Abnormal finding on diagnostic imaging of other region of abdomen or pelvis
Postherpetic neuralgia
Abnormal finding on diagnostic imaging of other region of abdomen or pelvis
Postherpetic neuralgia
Abnormal finding on diagnostic imaging of other region of abdomen or pelvis
Postherpetic neuralgia
Postherpetic neuralgia

## 2019-11-04 NOTE — PROGRESS NOTE ADULT - PROBLEM SELECTOR PROBLEM 6
Paroxysmal atrial fibrillation
Paroxysmal atrial fibrillation
Diabetes mellitus
Atrial Fibrillation
Paroxysmal atrial fibrillation
Diabetes mellitus
Hyponatremia
Paroxysmal atrial fibrillation
Diabetes mellitus
Diabetes mellitus

## 2019-11-04 NOTE — PROGRESS NOTE ADULT - PROBLEM SELECTOR PLAN 5
follow
follow
hold dig
FU BP
follow
Hemoglobin better after 1 unit prbc
Hemoglobin better after 1 unit prbc No further blood draws
Resolved.  Continue IV fluids for now
diet controlled
follow
follow
stable
stable sp 1 unit prbc last night for anemia with tachycardia for comfort per HCP request
added lasix, better, needs fu
Resolved.  Continue IV fluids for now
follow
follow
palliative care will sign off as goals are defined  patient wants hospice, HCP on board. [facility] to follow, patient not a candidate for inpatient hospice  please reconsult if issues arise.
follow
Hemoglobin better after 1 unit prbcNo further blood draws
hold dig

## 2019-11-04 NOTE — GOALS OF CARE CONVERSATION - ADVANCED CARE PLANNING - CONVERSATION DETAILS
[facility] approval received. Possible d/c tomorrow, pending medical clearance. TC to HCP Gerardo Gerard (x3) regarding pending DME order/delivery. Unable to make contact, unable to leave voice message (mailbox full). CM apprised. Will f/u tomorrow.
[facility] - Patient to be d/c today at approximately 5pm, if medically cleared.  As per the Patient's Son/PCG, Gerardo Gerard, he has HHA's in place, for 24/7 service. This information was confirmed by the [facility]. DME (Bed, mattress, O2) was delivered on 11/2, confirmed.
Spoke with pt's son/PCG several times by phone to assist with arrangements for pt's discharge home.  DME will be delivered tomorrow.  Son Gerardo is arranging for private hire care at home, he has [facility] contracted HHA agency list and contacted several agencies without a definite answer re: when start of care could be.  Consents for hospice care have been signed by pt's HCP, Ghanshyam Zendejas.

## 2019-11-04 NOTE — PROGRESS NOTE ADULT - PROBLEM SELECTOR PLAN 7
tylenol prn contin pamelor
tylenol prn contin pamelor
contin synthroid
hospital protocol
tylenol prn contin pamelor
No further blood draw
follow
hold anticoag and dig
stable
tylenol prn contin pamelor
[facility] protocol
tylenol prn contin pamelor
No further blood draw
contin synthroid

## 2019-11-04 NOTE — PROGRESS NOTE ADULT - PROBLEM SELECTOR PLAN 4
bp ok
BP hi, add lasix and fu, SCr lo
diet controlled
has been good recently just fatigued occasionally mildly confused forgetful
occasionally mildly confused forgetful
see above follow h/h
suspect due to delerium from being in the hospital varies depending on the day
better but 134
diet controlled
MOLST completed by primary team for DNR/DNI
diet controlled
suspect due to delerium from being in the hospital varies depending on the day
bp ok
diet controlled
diet controlled

## 2019-11-04 NOTE — DISCHARGE NOTE PROVIDER - HOSPITAL COURSE
Metastatic breast cancer.  Plan: Patient lytic lesions And indeterminate lesions in the liver, history of breast cancer ER/[place] positive, HER-2/donis negative decision was made not to do medication or radiation CA 27-29 was 15.6 on May 7, 2019 and 22.3 on September 17, 2019 with normal range less than 38.7. as per discussion with heme onc yesterday given pts overall deteriorating condition plan for home with hospice most appropriate at this time without hormone therapy or radiation therapy. Patient seen by the palliative care team and hospice care network and working on arrangements for home hospice. discharge when all arrangements have been made.  tylenol atc. morphine prn. plan for discharge to home today with hospice care and 24 hr aid today if arrangements have been made.          Problem/Plan - 2:    ·  Problem: Advance care planning.  Plan: Patient is DNR. awaiting home hospice and 24 hr care arrangements. plan for dc to home today if arrangements are made.          Problem/Plan - 3:    ·  Problem: Sepsis.  Plan: stable off abx.          Problem/Plan - 4:    ·  Problem: Altered mental status.  Plan: occasionally mildly confused forgetful.          Problem/Plan - 5:    ·  Problem: Anemia, unspecified type.  Plan: stable sp 1 unit prbc last night for anemia with tachycardia for comfort per HCP request.          Problem/Plan - 6:    Problem: Diabetes mellitus. Plan: diet controlled.         Problem/Plan - 7:    ·  Problem: Hyponatremia.  Plan: stable.          Problem/Plan - 8:    ·  Problem: Paroxysmal atrial fibrillation.  Plan: hold anticoag and dig.          Problem/Plan - 9:    ·  Problem: Postherpetic neuralgia.  Plan: tylenol morphine prn.          Problem/Plan - 10:    Problem: Prophylactic measure. Plan; intermittent pneumatic compression stockings.        Attending Attestation:     11. Hypokalemia resolved    12. Leukocytosis no obvious source or sign of infection.  Hold off on antibiotics at this time No further blood draws    13. Right middle lobe mucoid impaction no treatment needed at this time    14. Urinary retention schneider dcd Voiding okay    15. HTN no tx needed contin beta blocker

## 2019-11-04 NOTE — PROGRESS NOTE ADULT - PROBLEM SELECTOR PROBLEM 10
Advance care planning
Prophylactic measure
Advance care planning
Acute cystitis without hematuria
Advance care planning
Prophylactic measure

## 2019-11-04 NOTE — PROGRESS NOTE ADULT - PROBLEM SELECTOR PROBLEM 8
Prophylactic measure
Breast cancer
Prophylactic measure
Hypothyroidism
Paroxysmal atrial fibrillation
Prophylactic measure
Breast cancer
Prophylactic measure
Paroxysmal atrial fibrillation
Prophylactic measure

## 2019-11-04 NOTE — DISCHARGE NOTE PROVIDER - NSDCCPCAREPLAN_GEN_ALL_CORE_FT
PRINCIPAL DISCHARGE DIAGNOSIS  Diagnosis: Acute cystitis without hematuria  Assessment and Plan of Treatment: Treated      SECONDARY DISCHARGE DIAGNOSES  Diagnosis: Metastatic breast cancer  Assessment and Plan of Treatment: Patient is DNR. discharge home with hospice and 24 hr    Diagnosis: Anemia, unspecified type  Assessment and Plan of Treatment: s/p PRBC --  Supporative care   discharge home with hospice    Diagnosis: Essential hypertension  Assessment and Plan of Treatment: Low salt diet  Activity as tolerated.  Take all medication as prescribed.  Follow up with your medical doctor for routine blood pressure monitoring at your next visit.  Notify your doctor if you have any of the following symptoms:   Dizziness, Lightheadedness, Blurry vision, Headache, Chest pain, Shortness of breath

## 2019-11-04 NOTE — PROGRESS NOTE ADULT - PROBLEM SELECTOR PROBLEM 5
Paroxysmal atrial fibrillation
Essential hypertension
Hyponatremia
Anemia, unspecified type
Diabetes mellitus
Hyponatremia
Essential hypertension
Hyponatremia
Palliative care encounter
Hyponatremia
Anemia, unspecified type
Paroxysmal atrial fibrillation
Hyponatremia
Hyponatremia

## 2019-11-04 NOTE — PROGRESS NOTE ADULT - PROBLEM SELECTOR PROBLEM 1
Sepsis
Abnormal finding on diagnostic imaging of other region of abdomen or pelvis
Metastatic breast cancer
Sepsis
Abdominal pain
Sepsis
Sepsis
Pain
Anemia, unspecified type
Sepsis
Sepsis
Abdominal pain
Sepsis
Anemia, unspecified type

## 2019-11-04 NOTE — DISCHARGE NOTE PROVIDER - CARE PROVIDER_API CALL
Gamal Martinez)  Internal Medicine; Pediatrics  [address], [address]  [place] [address]  Phone: [phone]  Fax: [phone]  Follow Up Time:

## 2019-11-04 NOTE — PROGRESS NOTE ADULT - PROVIDER SPECIALTY LIST ADULT
Family Medicine
Gastroenterology
Heme/Onc
Infectious Disease
Internal Medicine
Orthopedics
Palliative Care
Heme/Onc
Gastroenterology
Heme/Onc
Internal Medicine
Family Medicine
Internal Medicine
Internal Medicine

## 2019-11-04 NOTE — DISCHARGE NOTE NURSING/CASE MANAGEMENT/SOCIAL WORK - PATIENT PORTAL LINK FT
You can access the FollowMyHealth Patient Portal offered by [facility] by registering at the following website: [email or web address]. By joining [employer]’s FollowMyHealth portal, you will also be able to view your health information using other applications (apps) compatible with our system.

## 2019-11-12 PROCEDURE — 83540 ASSAY OF IRON: CPT

## 2019-11-12 PROCEDURE — 72158 MRI LUMBAR SPINE W/O & W/DYE: CPT

## 2019-11-12 PROCEDURE — 82803 BLOOD GASES ANY COMBINATION: CPT

## 2019-11-12 PROCEDURE — 86335 IMMUNFIX E-PHORSIS/URINE/CSF: CPT

## 2019-11-12 PROCEDURE — 84155 ASSAY OF PROTEIN SERUM: CPT

## 2019-11-12 PROCEDURE — 73552 X-RAY EXAM OF FEMUR 2/>: CPT

## 2019-11-12 PROCEDURE — 71045 X-RAY EXAM CHEST 1 VIEW: CPT

## 2019-11-12 PROCEDURE — 71250 CT THORAX DX C-: CPT

## 2019-11-12 PROCEDURE — 87086 URINE CULTURE/COLONY COUNT: CPT

## 2019-11-12 PROCEDURE — 83519 RIA NONANTIBODY: CPT

## 2019-11-12 PROCEDURE — 86923 COMPATIBILITY TEST ELECTRIC: CPT

## 2019-11-12 PROCEDURE — 99285 EMERGENCY DEPT VISIT HI MDM: CPT | Mod: 25

## 2019-11-12 PROCEDURE — 85027 COMPLETE CBC AUTOMATED: CPT

## 2019-11-12 PROCEDURE — 87040 BLOOD CULTURE FOR BACTERIA: CPT

## 2019-11-12 PROCEDURE — A9585: CPT

## 2019-11-12 PROCEDURE — 82607 VITAMIN B-12: CPT

## 2019-11-12 PROCEDURE — 82728 ASSAY OF FERRITIN: CPT

## 2019-11-12 PROCEDURE — 82746 ASSAY OF FOLIC ACID SERUM: CPT

## 2019-11-12 PROCEDURE — 70551 MRI BRAIN STEM W/O DYE: CPT

## 2019-11-12 PROCEDURE — 72170 X-RAY EXAM OF PELVIS: CPT

## 2019-11-12 PROCEDURE — 82272 OCCULT BLD FECES 1-3 TESTS: CPT

## 2019-11-12 PROCEDURE — 85610 PROTHROMBIN TIME: CPT

## 2019-11-12 PROCEDURE — 84156 ASSAY OF PROTEIN URINE: CPT

## 2019-11-12 PROCEDURE — 93005 ELECTROCARDIOGRAM TRACING: CPT

## 2019-11-12 PROCEDURE — 82962 GLUCOSE BLOOD TEST: CPT

## 2019-11-12 PROCEDURE — P9016: CPT

## 2019-11-12 PROCEDURE — 97116 GAIT TRAINING THERAPY: CPT

## 2019-11-12 PROCEDURE — 97162 PT EVAL MOD COMPLEX 30 MIN: CPT

## 2019-11-12 PROCEDURE — 86334 IMMUNOFIX E-PHORESIS SERUM: CPT

## 2019-11-12 PROCEDURE — 73521 X-RAY EXAM HIPS BI 2 VIEWS: CPT

## 2019-11-12 PROCEDURE — 72157 MRI CHEST SPINE W/O & W/DYE: CPT

## 2019-11-12 PROCEDURE — 84165 PROTEIN E-PHORESIS SERUM: CPT

## 2019-11-12 PROCEDURE — 83550 IRON BINDING TEST: CPT

## 2019-11-12 PROCEDURE — 82310 ASSAY OF CALCIUM: CPT

## 2019-11-12 PROCEDURE — 80053 COMPREHEN METABOLIC PANEL: CPT

## 2019-11-12 PROCEDURE — 83690 ASSAY OF LIPASE: CPT

## 2019-11-12 PROCEDURE — 78803 RP LOCLZJ TUM SPECT 1 AREA: CPT

## 2019-11-12 PROCEDURE — A9561: CPT

## 2019-11-12 PROCEDURE — 86901 BLOOD TYPING SEROLOGIC RH(D): CPT

## 2019-11-12 PROCEDURE — 97110 THERAPEUTIC EXERCISES: CPT

## 2019-11-12 PROCEDURE — 78306 BONE IMAGING WHOLE BODY: CPT

## 2019-11-12 PROCEDURE — 97530 THERAPEUTIC ACTIVITIES: CPT

## 2019-11-12 PROCEDURE — 36430 TRANSFUSION BLD/BLD COMPNT: CPT

## 2019-11-12 PROCEDURE — 86900 BLOOD TYPING SEROLOGIC ABO: CPT

## 2019-11-12 PROCEDURE — 85730 THROMBOPLASTIN TIME PARTIAL: CPT

## 2019-11-12 PROCEDURE — 86850 RBC ANTIBODY SCREEN: CPT

## 2019-11-12 PROCEDURE — 74177 CT ABD & PELVIS W/CONTRAST: CPT

## 2019-11-12 PROCEDURE — 87186 SC STD MICRODIL/AGAR DIL: CPT

## 2019-11-12 PROCEDURE — 83521 IG LIGHT CHAINS FREE EACH: CPT

## 2019-11-12 PROCEDURE — 83605 ASSAY OF LACTIC ACID: CPT

## 2019-11-12 PROCEDURE — 81001 URINALYSIS AUTO W/SCOPE: CPT

## 2019-11-12 PROCEDURE — 80162 ASSAY OF DIGOXIN TOTAL: CPT

## 2019-11-12 PROCEDURE — 84145 PROCALCITONIN (PCT): CPT

## 2019-11-12 PROCEDURE — 80048 BASIC METABOLIC PNL TOTAL CA: CPT

## 2019-11-12 PROCEDURE — 83735 ASSAY OF MAGNESIUM: CPT

## 2019-11-12 PROCEDURE — 74018 RADEX ABDOMEN 1 VIEW: CPT

## 2019-11-12 PROCEDURE — 78999 UNLISTED MISC PX DX NUC MED: CPT

## 2019-11-12 PROCEDURE — 83970 ASSAY OF PARATHORMONE: CPT

## 2021-03-16 NOTE — ED ADULT NURSE NOTE - PAIN RATING/NUMBER SCALE (0-10): REST
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  Xanax has been effective in controlling anxiety  No evidence of abuse or diversion the current therapy improves functionPDMP reviewed no concerns    Chronic non intractable headache Fioricet affective    Hyperlipidemia on atorvastatin 10 mg of laboratory pending    Acquired hypothyroidism euthyroid on current therapy    Panlobular emphysema with shortness of breath insomnia continues to smoke and is not interested in stopping    Essential hypertension with blood pressure controlled on current therapy    Peripheral vascular disease currently stable    Nausea vomiting and chills have resolved patient says they were going back and forth between her daughter and herself for 3 weeks now resolved    Problem List Items Addressed This Visit        Endocrine    Acquired hypothyroidism       Respiratory    Panlobular emphysema ([facility] [address] )       Cardiovascular and Mediastinum    Peripheral vascular disease, unspecified ([facility] )    Essential hypertension       Other    Chronic nonintractable headache    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    Protein-calorie malnutrition ([facility] )      Other Visit Diagnoses     Vitamin D deficiency    -  Primary    Hyperlipidemia, unspecified hyperlipidemia type        Folate deficiency               Diagnoses and all orders for this visit:    Vitamin D deficiency  -     Vitamin D 25 hydroxy; Future    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  -     ALPRAZolam (XANAX) 0 5 mg tablet; Take 1 tablet (0 5 mg total) by mouth 4 (four) times a day as needed for anxiety    Hyperlipidemia, unspecified hyperlipidemia type  -     atorvastatin (LIPITOR) 10 mg tablet; Take 2 tablets (20 mg total) by mouth daily Takes at lunch  -     CBC and differential; Future  -     Comprehensive metabolic panel; Future  -     Lipid Panel with Direct LDL reflex;  Future    Chronic nonintractable headache, unspecified headache type  - butalbital-acetaminophen-caffeine (FIORICET,ESGIC) -40 mg per tablet; Take 1 tablet by mouth 4 (four) times a day as needed for headaches    Panlobular emphysema (HCC)    Acquired hypothyroidism  -     levothyroxine 112 mcg tablet; Take 1 tablet (112 mcg total) by mouth daily    Essential hypertension  -     metoprolol tartrate (LOPRESSOR) 25 mg tablet; Take 1 tablet (25 mg total) by mouth daily    Protein-calorie malnutrition, unspecified severity (HCC)    Peripheral vascular disease, unspecified ([facility] [address] )  -     Comprehensive metabolic panel; Future    Folate deficiency  -     folic acid (FOLVITE) 1 mg tablet; Take 1 tablet (1 mg total) by mouth 2 (two) times a day        No problem-specific Assessment & Plan notes found for this encounter  PHQ-9 Depression Screening    PHQ-9:   Frequency of the following problems over the past two weeks: Body mass index is 20 3 kg/m²  BMI Counseling: Body mass index is 20 3 kg/m²  The BMI     Subjective:      Patient ID: Jeison Desai is a 71 y o  female  Patient presents for 3 month checkup states that they have had some gastrointestinal disturbance with nausea vomiting and diarrhea over the last 2 to 3 weeks now resolving      The following portions of the patient's history were reviewed and updated as appropriate:   She has a past medical history of CAD (coronary artery disease), COPD (chronic obstructive pulmonary disease) ([facility] ), Disease of thyroid gland, Hypertension, Per[facility]ated [facility] ([facility] ), Rapid heart beat, and Sleep apnea  ,  does not have any pertinent problems on file  ,   has a past surgical history that includes Eye surgery (Bilateral) and Carpal tunnel release (Bilateral)  ,  family history includes Cancer in her mother; Stroke in her father  ,   reports that she has been smoking cigarettes  She has been smoking about 1 00 pack per day  She has never used smokeless tobacco  She reports current alcohol use   She reports that she does not use drugs ,  is allergic to biaxin [clarithromycin]     Current Outpatient Medications   Medication Sig Dispense Refill    ALPRAZolam (XANAX) 0 5 mg tablet Take 1 tablet (0 5 mg total) by mouth 4 (four) times a day as needed for anxiety 120 tablet 2    aspirin 81 MG tablet Take 81 mg by mouth daily   atorvastatin (LIPITOR) 10 mg tablet TAKE 2 TABLETS (20 MG TOTAL) BY MOUTH DAILY TAKES AT LUNCH 180 tablet 0    butalbital-acetaminophen-caffeine (FIORICET,ESGIC) -40 mg per tablet TAKE 1 TABLET BY MOUTH 4 (FOUR) TIMES A DAY AS NEEDED FOR HEADACHES 120 tablet 2    ergocalciferol (VITAMIN D2) 50,000 units Take 50,000 Units by mouth once a week      fluticasone (FLOVENT HFA) 110 MCG/ACT inhaler Inhale 2 puffs 2 (two) times a day Rinse mouth after use  12 g 5    folic acid (FOLVITE) 1 mg tablet Take 1 mg by mouth 2 (two) times a day   levothyroxine 112 mcg tablet Take 1 tablet (112 mcg total) by mouth daily 90 tablet 1    metoprolol tartrate (LOPRESSOR) 25 mg tablet Take 1 tablet (25 mg total) by mouth daily 90 tablet 1     No current facility-administered medications for this visit  Review of Systems   Constitutional: Negative for chills and fever  HENT: Negative for ear pain and sore throat  Eyes: Negative for pain and visual disturbance  Respiratory: Positive for cough, shortness of breath and wheezing  Cardiovascular: Negative for chest pain and palpitations  Gastrointestinal: Negative for abdominal pain and vomiting  Genitourinary: Negative for dysuria and hematuria  Musculoskeletal: Negative for arthralgias and back pain  Skin: Negative for color change and rash  Neurological: Negative for seizures and syncope  All other systems reviewed and are negative  Objective:    /76   Pulse 76   Temp 98 °F (36 7 °C)   Resp 16   Ht 5' 2" (1 575 m)   Wt 50 3 kg (111 lb)   BMI 20 30 kg/m²   Body mass index is 20 3 kg/m²       Physical Exam  Constitutional: Appearance: She is well-developed  HENT:      Head: Normocephalic  Eyes:      Pupils: Pupils are equal, round, and reactive to light  Neck:      Musculoskeletal: Normal range of motion  Cardiovascular:      Rate and Rhythm: Normal rate and regular rhythm  Heart sounds: Normal heart sounds  Pulmonary:      Effort: Pulmonary effort is normal       Breath sounds: Normal breath sounds  Abdominal:      General: Bowel sounds are normal       Palpations: Abdomen is soft  Tenderness: There is no abdominal tenderness  Skin:     General: Skin is warm  Neurological:      Mental Status: She is alert and oriented to person, place, and time  0

## 2021-07-02 NOTE — PROGRESS NOTE ADULT - PROBLEM SELECTOR PLAN 9
Patient lytic lesions And indeterminate lesions in the liver, history of breast cancer ER/PA positive, HER-2/donis negative decision was made not to do medication or radiation CA 27-29 was 15.6 on May 7, 2019 and 22.3 on September 17, 2019 with normal range less than 38.7 Heme onc consulted and input appreciated. bone scan noted contin workup await mr t spine and l spine no cord lesions heme onc favors biopsy of one of the bony discussed w HCP who wants to avoid uncomfortable procedure if possible will dw HCP further today consider empiric tx w aromatase inhibitor
Patient lytic lesions And indeterminate lesions in the liver, history of breast cancer ER/OR positive, HER-2/donis negative decision was made not to do medication or radiation CA 27-29 was 15.6 on May 7, 2019 and 22.3 on September 17, 2019 with normal range less than 38.7 Heme onc consulted and input appreciated. bone scan noted contin workup await mr t spine and l spine no cord lesions We'll discuss again later today with health care proxy about possibility of biopsy of one of the bony lesions in particular rib lesion where patient is complaining of some pain in his may benefit from palliative radiation
tylenol prn contin pamelor.   Gabapentin discontinued per health care agents directive.
Patient lytic lesions And indeterminate lesions in the liver, history of breast cancer ER/[place] positive, HER-2/donis negative decision was made not to do medication or radiation CA 27-29 was 15.6 on May 7, 2019 and 22.3 on September 17, 2019 with normal range less than 38.7 Heme onc consulted and input appreciated. bone scan noted contin workup await mr t spine and l spine no cord lesions heme onc favors biopsy of one of the bony discussed w HCP who wants to avoid uncomfortable procedure if possible will dw HCP further today consider empiric tx w aromatase inhibitor
Patient lytic lesions And indeterminate lesions in the liver, history of breast cancer ER/AR positive, HER-2/donis negative decision was made not to do medication or radiation CA 27-29 was 15.6 on May 7, 2019 and 22.3 on September 17, 2019 with normal range less than 38.7 Heme onc consulted and input appreciated. bone scan noted contin workup await mr t spine and l spine no cord lesions We'll discuss again later today with health care proxy about possibility of biopsy of one of the bony lesions in particular rib lesion where patient is complaining of some pain in his may benefit from palliative radiation
Patient lytic lesions And indeterminate lesions in the liver, history of breast cancer ER/IA positive, HER-2/donis negative decision was made not to do medication or radiation CA 27-29 was 15.6 on May 7, 2019 and 22.3 on September 17, 2019 with normal range less than 38.7 Heme onc consulted and input appreciated. bone scan noted contin workup await mr brain get mr t spine and l spine Consider biopsy of one of the bony lesions
Patient lytic lesions And indeterminate lesions in the liver, history of breast cancer ER/[place] positive, HER-2/donis negative decision was made not to do medication or radiation CA 27-29 was 15.6 on May 7, 2019 and 22.3 on September 17, 2019 with normal range less than 38.7 He mom consulted and input appreciated. Will do additional imaging for workup Consider biopsy of one of the bony lesions
Patient lytic lesions And indeterminate lesions in the liver, history of breast cancer ER/[place] positive, HER-2/donis negative decision was made not to do medication or radiation CA 27-29 was 15.6 on May 7, 2019 and 22.3 on September 17, 2019 with normal range less than 38.7 Heme onc consulted and input appreciated. bone scan noted contin workup await mr t spine and l spine no cord lesions We'll discuss again later today with health care proxy about possibility of biopsy of one of the bony lesions in particular rib lesion where patient is complaining of some pain in his may benefit from palliative radiation
Patient lytic lesions And indeterminate lesions in the liver, history of breast cancer ER/[place] positive, HER-2/donis negative decision was made not to do medication or radiation CA 27-29 was 15.6 on May 7, 2019 and 22.3 on September 17, 2019 with normal range less than 38.7 will consult patients oncologist For further evaluation
tylenol morphine prn
tylenol prn
tylenol prn contin pamelor
tylenol prn contin pamelor
Patient lytic lesions And indeterminate lesions in the liver, history of breast cancer ER/[place] positive, HER-2/donis negative decision was made not to do medication or radiation CA 27-29 was 15.6 on May 7, 2019 and 22.3 on September 17, 2019 with normal range less than 38.7 Heme onc consulted and input appreciated. bone scan noted contin workup await mr t spine and l spine no cord lesions heme onc favors biopsy of one of the bony discussed w HCP who wants to avoid uncomfortable procedure if possible will dw HCP further today consider empiric tx w aromatase inhibitor
Patient lytic lesions And indeterminate lesions in the liver, history of breast cancer ER/[place] positive, HER-2/donis negative decision was made not to do medication or radiation CA 27-29 was 15.6 on May 7, 2019 and 22.3 on September 17, 2019 with normal range less than 38.7 Heme onc consulted and input appreciated. bone scan noted contin workup await mr t spine and l spine Consider biopsy of one of the bony lesions will discuss with heme onc
Patient lytic lesions And indeterminate lesions in the liver, history of breast cancer ER/VT positive, HER-2/donis negative decision was made not to do medication or radiation CA 27-29 was 15.6 on May 7, 2019 and 22.3 on September 17, 2019 with normal range less than 38.7 Heme onc consulted and input appreciated. bone scan noted contin workup await mr t spine and l spine no cord lesions We'll discuss again later today with health care proxy about possibility of biopsy of one of the bony lesions in particular rib lesion where patient is complaining of some pain in his may benefit from palliative radiation
Patient lytic lesions And indeterminate lesions in the liver, history of breast cancer ER/[place] positive, HER-2/donis negative decision was made not to do medication or radiation CA 27-29 was 15.6 on May 7, 2019 and 22.3 on September 17, 2019 with normal range less than 38.7 Heme onc consulted and input appreciated. bone scan noted contin workup await mr t spine and l spine no cord lesions heme onc favors biopsy of one of the bony discussed w HCP who wants to avoid uncomfortable procedure if possible will dw HCP further today consider empiric tx w aromatase inhibitor
tylenol prn
Discharged
tylenol prn contin pamelor and gabapentin

## 2021-08-18 NOTE — ED PROVIDER NOTE - CPE EDP SKIN NORM
no lesions , no deformities , no traumatic injuries , no significant scars are present , breathing is unlabored without accessory muscle use. , normal breath sounds. normal...

## 2021-09-01 NOTE — ED PROVIDER NOTE - PMH
Anesthesia Pre Eval Note    Anesthesia ROS/Med Hx        Anesthetic Complication History:  Patient does not have a history of anesthetic complications      Pulmonary Review:  Patient does not have a pulmonary history      Neuro/Psych Review:  Patient does not have a neuro/psych history       Cardiovascular Review:  Exercise tolerance: good (>4 METS)    GI/HEPATIC/RENAL Review:    Positive for GERD    End/Other Review:    Positive for obesity class I - 30.00 - 34.99    Overall Review of Systems Comments:  -minimum 5-10 ETOH drinks per week  Additional Results:     ALLERGIES:   -- Doxycycline -- Other (See Comments)    --  Tightness in chest       Lab Results       Component                Value               Date                       WBC                      11.4 (H)            08/30/2021                 RBC                      5.46                08/30/2021                 HGB                      16.6                08/30/2021                 HCT                      48.0                08/30/2021                 MCHC                     34.6                08/30/2021                 SODIUM                   141                 08/30/2021                 POTASSIUM                4.0                 08/30/2021                 CHLORIDE                 103                 08/30/2021                 CO2                      27                  08/30/2021                 GLUCOSE                  108 (H)             08/30/2021                 GLUCOSE                  93                  09/24/2018                 BUN                      11                  08/30/2021                 CREATININE               0.82                08/30/2021                 GFRESTIMATE              >90                 08/30/2021                 CALCIUM                  9.0                 08/30/2021                 PLT                      265                 08/30/2021             Past Medical History:  No date: Cardiovascular event  risk      Comment:  10 year estimated CV risk as of 9/2018 is 1.2%.  No date: Epididymitis      Comment:  Treated by Dr. Goddard.  No date: GERD (gastroesophageal reflux disease)      Comment:  Controlled with omeprazole    Past Surgical History:  No date: No past surgeries       Prior to Admission medications :  Medication HYDROcodone-acetaminophen (NORCO) 5-325 MG per tablet, Sig Take 1 tablet by mouth every 6 hours as needed for Pain., Start Date 8/29/21, End Date , Taking? Yes, Authorizing Provider Catrachito Hayward PA-C    Medication omeprazole (PRILOSEC) 20 MG capsule, Sig 1 cap 3 time a week, Start Date 5/11/16, End Date , Taking? Yes, Authorizing Provider Greg Goddard MD    Medication HYDROcodone-acetaminophen (NORCO) 5-325 MG per tablet, Sig Take 1 tablet by mouth every 4 hours as needed for Pain (moderate to severe post op pain)., Start Date 9/1/21, End Date , Taking? , Authorizing Provider Fernanda Obregon NP            Relevant Problems   No relevant active problems       Physical Exam     Airway   Mallampati: II  TM Distance: >3 FB  Neck ROM: Full  Neck: Patient has a beard  TMJ Mobility: Good    Cardiovascular  Cardiovascular exam normal    Head Assessment  Head assessment: Normocephalic and Atraumatic    General Assessment  General Assessment: Alert and oriented and No acute distress    Dental Exam  Dental exam normal    Pulmonary Exam  Pulmonary exam normal    Abdominal Exam    Patient Demonstrates:  Obese      Anesthesia Plan:  Anesthesia Plan  No phone call attempted due to:  ASA Status: 2    Anesthesia Type: General    Induction: Intravenous  Preferred Airway Type: LMA  Maintenance: Inhalational  Premedication: IV      Post-op Pain Management: Per Surgeon      Checklist  Reviewed: Lab Results, EKG, Nursing Notes, Consultations, NPO Status, Problem list, Medications, Beta Blocker Status, Patient Summary, Allergies and Past Med History  Consent/Risks Discussed Statement:  The proposed  Age-Related Hearing Loss  wears hearing aids  Atrial Fibrillation    Bleeding Duodenal Ulcer  resolved several years ago  CAD (coronary artery disease)    Fall, subsequent encounter  2 years  HTN (Hypertension)    Hypercholesteremia    Injury of head, subsequent encounter  s/p fall 2 years ago   had bleed into area now ok.  Malignant neoplasm of left breast in female, estrogen receptor positive, unspecified site of breast  ER/NM positive  HER Negative (per family)  Osteopenia    Polymyalgia rheumatica    Unsteady gait anesthetic plan, including its risks and benefits, have been discussed with the Patient along with the risks and benefits of alternatives. Questions were encouraged and answered and the patient and/or representative understands and agrees to proceed.    I have discussed elements of the patient's history or examination, as noted above and/or as follows, that place the patient at higher risk of complications; BMI> 30 (obesity).    I discussed with the patient (and/or patient's legal representative) the risks and benefits of the proposed anesthesia plan, General, which may include services performed by other anesthesia providers.    Alternative anesthesia plans, if available, were reviewed with the patient (and/or patient's legal representative). Discussion has been held with the patient (and/or patient's legal representative) regarding risks of anesthesia, which include Nausea, Vomiting, Dental Injury, Sore Throat, Allergic Reaction, Hypotension, Aspiration, Nerve Injury, allergic reaction, nausea, vomiting, sore throat, oral injury, dental injury and nerve injury and emergent situations that may require change in anesthesia plan.    The patient (and/or patient's legal representative) has indicated understanding, his/her questions have been answered, and he/she wishes to proceed with the planned anesthetic.    Comments  Plan Comments: Functional capacity > 4 METS.  Health status is optimized.  I explained the risks and benefits of the regional anesthesia block including but not limited to chronic/permanent nerve damage, infection, bleeding, failed block, and local anesthetic toxicity.  The patient understands these risks and is willing to proceed with the nerve block.  I discussed the risks and benefits of general anesthesia including but not limited to dental/oral damage, nausea and vomiting, hoarse voice, sore throat, and allergic reaction to anesthetic medications with the patient.  All questions pertaining to  anesthetic plan answered.

## 2022-02-28 NOTE — ED ADULT NURSE NOTE - CHPI ED SYMPTOMS NEG
PSR: can you ask patient what portion is she referring too?    Left message on machine     no confusion/no vomiting/no fever/no nausea/no dizziness

## 2022-04-28 NOTE — ED ADULT NURSE NOTE - ED CARDIAC CAPILLARY REFILL
2 seconds or less Fluconazole Counseling:  Patient counseled regarding adverse effects of fluconazole including but not limited to headache, diarrhea, nausea, upset stomach, liver function test abnormalities, taste disturbance, and stomach pain.  There is a rare possibility of liver failure that can occur when taking fluconazole.  The patient understands that monitoring of LFTs and kidney function test may be required, especially at baseline. The patient verbalized understanding of the proper use and possible adverse effects of fluconazole.  All of the patient's questions and concerns were addressed.

## 2024-04-04 NOTE — ED ADULT NURSE NOTE - PAIN RATING/NUMBER SCALE (0-10): ACTIVITY
Quality 226: Preventive Care And Screening: Tobacco Use: Screening And Cessation Intervention: Patient screened for tobacco use and is an ex/non-smoker Detail Level: Detailed Quality 130: Documentation Of Current Medications In The Medical Record: Current Medications Documented 10

## 2024-11-27 NOTE — ED ADULT TRIAGE NOTE - SOURCE OF INFORMATION
Duplicate medication request. Script sent in on 11/14/2024 with 11 refills on file. Mother notified.   
Patient

## 2025-06-03 NOTE — ASU PREOP CHECKLIST - NS PREOP CHK HIBICLENS NA
DATE OF SERVICE:  6/3/2025     CLINIC:  [facility]  [address] [facility]  [place] [address] [phone]    CHIEF COMPLAINT:   No chief complaint on file.      REFERRING PHYSICIAN: No ref. provider found    PRIMARY CARE PHYSICIAN: eZe Orlando MD    HISTORY OF PRESENT ILLNESS:  Patient is seen today at the kind referral of  No ref. provider found for evaluation of low iron.  Available electronic medical records are reviewed.  Labs reveal microcytic anemia with low ferritin and low iron saturation.  She does not have ongoing menstrual losses as she has had hysterectomy in the past.  She never had a colonoscopy.    She is family history of iron-deficiency.    SUBJECTIVE:  Fatigue, tiredness, shortness of breath with exertion.  Review of systems as noted below.    REVIEW OF SYSTEMS:  Reviewed and verified per nursing assessment as noted below:  No notes on file    PAST MEDICAL HISTORY    Fibromyalgia                                                  ADD (attention deficit disorder)                              Chronic fatigue syndrome                                      Hypothyroidism                                  2012          Thyroid cyst                                    2012            Comment: 2.5cm, drained     Nontoxic multinodular goiter                    2012          Kidney stones                                                 Vitamin D deficiency                                          Nephrocalcinosis                                              Impaired fasting blood sugar                                  Dry eyes, bilateral                                           Insomnia                                                      Low back pain                                                 Multiple joint pain                                           Ankylosing spondylitis of esezxtul-lmactmo-ldl*               PONV (postoperative nausea and vomiting)                       PAST SURGICAL  HISTORY    TOTAL ABDOM HYSTERECTOMY                        10/30/2009     DELIVERY+POSTPARTUM CARE                               Comment: x 1    CARPAL TUNNEL RELEASE                                          SOCIAL HISTORY    Tobacco Use: Never           Alcohol Use: Yes                Comment: occasionally       FAMILY HISTORY  Family History   Problem Relation Age of Onset    Hypertension Mother         pulmonary    Kidney disease Mother         stones    Heart disease Mother     Other Mother         fibromyalgia    Gastrointestinal Mother         GERD    Hyperlipidemia Mother     Osteoarthritis Mother     Endocrine Disorder Father         thyriod disease    Coronary Artery Disease Father     Hypertension Father     Kidney disease Father     Thyroid Father     Endocrine Disorder Paternal Uncle         APG 2 (Tate)    Kidney disease Sister         stones    Gastrointestinal Sister         GERD    Neurologic Disorder Paternal Uncle         myasthenia    Anxiety disorder Brother     Review of patient's family status indicates:    Mother                                                   Father                                                   Paternal Uncle                                           Sister                                                   Paternal Uncle                                           Brother                                                    ALLERGIES  ALLERGIES:  Omnicef [cefdinir] and Ultracet    MEDICATIONS  Current Outpatient Medications   Medication Sig Dispense Refill    Rinvoq 15 MG TABLET SR 24 HR Take 1 tablet by mouth daily. 30 tablet 2    predniSONE (DELTASONE) 5 MG tablet Take 6 tabs daily x 3 days, 5 tabs daily x 3 days, 4 tabs daily x 3 days, 3 tabs daily x 3 days, 2 tabs daily x 3 days, 1 tab daily x 3 days, then stop. 65 tablet 0    rosuvastatin (CRESTOR) 5 MG tablet Take 1 tablet by mouth daily. 90 tablet 1    HYDROcodone-acetaminophen (NORCO) 5-325 MG per tablet  Take 1 tablet by mouth 3 times daily as needed for Pain. 90 tablet 0    Progesterone 100 MG Cap Take 1 capsule by mouth daily.      NP Thyroid 60 MG tablet Take 60 mg by mouth daily.      NP Thyroid 15 MG tablet Take 15 mg by mouth daily.      estradiol (VIVELLE-DOT) 0.075 MG/24HR twice weekly patch Place 1 patch onto the skin 2 days a week. 24 patch 3    triamterene-hydrochlorothiazide (DYAZIDE) 37.5-25 MG per capsule TAKE 1 CAPSULE BY MOUTH EVERY DAY 90 capsule 1    estradiol (ESTRACE) 0.1 MG/GM vaginal cream PLACE 2 G VAGINALLY 1 DAY A WEEK. PLACE INSIDE THE VAGINAL AT NIGHT TWICE A WEEK AS DIRECTED 42.5 g 11    traZODone (DESYREL) 50 MG tablet Take 1 tablet by mouth nightly. 30 tablet 5    celecoxib (CeleBREX) 200 MG capsule Take 1 capsule by mouth in the morning and 1 capsule in the evening. 60 capsule 5    ibuprofen (MOTRIN) 600 MG tablet Take 1 tablet by mouth every 6 hours as needed for Pain. 20 tablet 1    ondansetron (ZOFRAN) 4 MG tablet Take 1 tablet by mouth every 12 hours as needed for Nausea. 90 tablet 5    COLLAGEN PO Collagen powder daily      estradiol (VAGIFEM) 10 MCG vaginal tablet Place 1 tablet vaginally 1 day a week. 12 tablet 3    baclofen (LIORESAL) 10 MG tablet Take 1 tablet by mouth in the morning and 1 tablet at noon and 1 tablet in the evening. 270 tablet 1    Cobalamin Combinations (B12 FOLATE PO)       fluticasone (FLONASE) 50 MCG/ACT nasal spray Spray 1 spray in each nostril in the morning and 1 spray in the evening. 48 mL 1    albuterol 108 (90 Base) MCG/ACT inhaler INHALE 2 PUFFS BY MOUTH EVERY 4 HOURS AS NEEDED FOR WHEEZE OR SHORTNESS OF BREATH 25.5 each 1    methylPREDNISolone (MEDROL) 4 MG tablet TAKE 2 TABLETS BY MOUTH EVERY DAY 60 tablet 5    pseudoephedrine (SUDAFED 12 HOUR) 120 MG 12 hr tablet Take 120 mg by mouth as needed.       Cholecalciferol (VITAMIN D) 2000 UNITS CAPS Take 5 capsules by mouth daily. 1 time weekly       No current facility-administered medications for  this visit.       PHYSICAL EXAMINATION:  There were no vitals filed for this visit.  ECOG performance status 1  Patient is alert and oriented to time place and person, in no acute distress.  Cranium: Normocephalic, atraumatic. No conjunctival pallor or icterus.  Psychiatric: Normal mood and affect. There is good understanding of the conversation and asks relevant questions.      LABORATORY DATA  WBC (K/mcL)   Date Value   04/16/2025 6.9     RBC (mil/mcL)   Date Value   04/16/2025 4.23     HCT (%)   Date Value   04/16/2025 39.4     HGB (g/dL)   Date Value   04/16/2025 13.7     PLT (K/mcL)   Date Value   04/16/2025 285       Sodium (mmol/L)   Date Value   02/13/2025 139     Potassium (mmol/L)   Date Value   02/13/2025 3.7     Chloride (mmol/L)   Date Value   02/13/2025 101     Glucose (mg/dL)   Date Value   02/13/2025 94     Calcium (mg/dL)   Date Value   02/13/2025 9.2     Carbon Dioxide (mmol/L)   Date Value   02/13/2025 31     BUN (mg/dL)   Date Value   02/13/2025 13     Creatinine (mg/dL)   Date Value   02/13/2025 0.90     Alkaline Phosphatase (Units/L)   Date Value   02/13/2025 46     GOT/AST (Units/L)   Date Value   02/13/2025 21     GPT/ALT (Units/L)   Date Value   02/13/2025 26       IMAGING  No results found.    ASSESSMENT   Iron deficiency secondary to malabsorption of iron; rule out GI bleed  Failure of and intolerance to oral iron therapy  Good response to IV iron therapy    PLAN  Monitor for recurrent iron deficiency    Patient responded well to IV iron therapy in 2024.  Levels are adequate at this time.  She completed [facility] with [facility] in 2024.  Going forward, she is comfortable having this monitored routinely with her PCP and she will call us with any questions or concerns.    FOLLOW UP  As needed             #1: